# Patient Record
Sex: FEMALE | Race: WHITE | Employment: FULL TIME | URBAN - METROPOLITAN AREA
[De-identification: names, ages, dates, MRNs, and addresses within clinical notes are randomized per-mention and may not be internally consistent; named-entity substitution may affect disease eponyms.]

---

## 2019-02-04 ENCOUNTER — PATIENT OUTREACH (OUTPATIENT)
Dept: CASE MANAGEMENT | Age: 29
End: 2019-02-04

## 2019-02-04 PROBLEM — Z17.0 MALIGNANT NEOPLASM OF BREAST IN FEMALE, ESTROGEN RECEPTOR POSITIVE (HCC): Status: ACTIVE | Noted: 2019-02-04

## 2019-02-04 PROBLEM — C50.919 MALIGNANT NEOPLASM OF BREAST IN FEMALE, ESTROGEN RECEPTOR POSITIVE (HCC): Status: ACTIVE | Noted: 2019-02-04

## 2019-02-04 NOTE — PROGRESS NOTES
Met patient today with Dr Ari Sandoval for consultation at our office. Pt is currently being treated in [place] with Dr Dae Chen for triple positive breast cancer however she will be moving to [facility] in probable April or May. Pt will have radiation oncology appt with Dr Hugh Lugo on 2-6-19 since pt will be relocated by radiation time       Pt will be receiving Herceptin/Perjeta for 1 yr so Dr Ari Sandoval told pt we could arrange for her to have infusion here although she may have to see [facility] first.     Pt was given Olivia Ding contact number, my number as secondary and the office number.  Nurse navigation will be following once pt has established herself in [place]

## 2019-02-05 ENCOUNTER — NURSE NAVIGATOR (OUTPATIENT)
Dept: CASE MANAGEMENT | Age: 29
End: 2019-02-05

## 2019-02-05 NOTE — PROGRESS NOTES
Called pt and left message on Voicemail for appt at 0830 tomorrow on 2-6-19 with Dr Sybil Benavidez.

## 2019-02-06 ENCOUNTER — HOSPITAL ENCOUNTER (OUTPATIENT)
Dept: RADIATION ONCOLOGY | Age: 29
Discharge: HOME | End: 2019-02-06
Payer: COMMERCIAL

## 2019-02-06 VITALS
BODY MASS INDEX: 20.3 KG/M2 | TEMPERATURE: 98.3 F | DIASTOLIC BLOOD PRESSURE: 68 MMHG | RESPIRATION RATE: 16 BRPM | OXYGEN SATURATION: 100 % | SYSTOLIC BLOOD PRESSURE: 132 MMHG | HEART RATE: 76 BPM | WEIGHT: 123.9 LBS

## 2019-02-06 PROCEDURE — 99211 OFF/OP EST MAY X REQ PHY/QHP: CPT

## 2019-02-06 NOTE — CONSULTS
Patient: Jenaro Doshi MRN: [id number]  SSN: [id number]    YOB: 1990  Age: 33 y.o. Sex: female      Other Providers:  Laury RAYMUNDO, Mariam King MD ([address] at [facility] in [place])    [address] [place] [address] Avenue: Breast cancer    DIAGNOSIS: Right breast invasive ductal carcinoma, Stage IIB ([id number]), ER/DC/ HER2 positive    PREVIOUS TREATMENT:  1)  Right partial mastectomy and sentinel lymph node biopsy on 9/24/18  2) C1 adjuvant TCHP 10/22/18, planned to complete 6th cycle 2/11/19  3) Planned completion mastectomy with expander placement. HISTORY OF PRESENT ILLNESS:  Jenaro Doshi is a 33 y.o. female who I am seeing at the request of Dr. Valentin Covarrubias. She initially presented in August 2018 with a palpable mass in her right lateral breast.  Upon further questioning, it had been present for approximately 8 years. There were no associated nipple discharge or skin changes. Right breast US on 8/27/18 identified a 3.5 cm mass with no suspicious axillary lymph nodes. US guided core biopsy 9/11/18 revealed IDC, grade 3, measuring 13 mm in maximal linear dimension. She saw genetics 9/17/18 and underwent egg collection and preservation. She subsequently underwent right partial mastectomy and sentinel lymph node biopsy on 9/24/18 which revealed stage IIb ([id number]), ER/DC/ AR+, HER-2 FISH positive, IDC at 9 o'clock. Surgical margins were uninvolved by invasive carcinoma but DCIS was present at the lateral margin focally, 3 mm. A focus of DCIS was also found 1 mm from deep margin and 2 mm from inferior margin. A13 mm lymph node was also found in 1/9 lymph nodes. Following surgery, she was seen by oncology, Dr. Mariam King, Oncologist at [facility] in [place], who recommended adjuvant chemotherapy. She started C1 adjuvant TCHP 10/22/18. She developed neutropenic fever requiring hospitalization from 12/9/18  12/12/18 after C3.   No source was found, but viral URI was presumed. C4 was delayed one week due to 70K platelet count. C4 was administered on 12/31/18 with carbo dose reduced to 700 mg (AUC 4.85). The plan outlined by Dr. Conrad Severs included 6 cycles of TCHP, with continuation of dual Ab therapy every 3 weeks for a total of one year if tolerated, as well as, starting endocrine therapy (tamoxifen vs ovarian suppression). She was last seen by Dr. Misa Baez on 1/21/19, and started cycle 5 of treatment the same day with plans to complete cycle six on 2/11/19 in [place]. However, she recently relocated to the [facility] and was seeking to establish local oncology care. She therefore met with Dr. Kim Claros 2/4/19 and also wished to have radiation delivered locally. As result, she was referred to our office for discussion and radiation planning. With the findings on her partial mastectomy, she does have a planned mastectomy with expanders to be placed early March. OBSTETRIC HISTORY:    Menarche at the age of 16. G0,P0. Oral Contraceptive Pills:  None  Hormone Replacement Therapy:  None    PAST MEDICAL HISTORY:    Past Medical History:   Diagnosis Date    Anxiety     Asthma     Cancer ([facility] [address] 09/2018    stage 2 triple positive ductal breast cancer       The patient denies history of collagen vascular diseases, pacemaker insertion, prior radiation or prior chemotherapy predating her breast cancer diagnosis. PAST SURGICAL HISTORY:   Past Surgical History:   Procedure Laterality Date    BREAST SURGERY PROCEDURE UNLISTED      HX BREAST LUMPECTOMY  09/2018    HX ORTHOPAEDIC Right 06/2009    knee surgery    HX ORTHOPAEDIC Left 06/2010    knee surgery    [place] BIOPSY OF BREAST, NEEDLE CORE Right 09/2018       MEDICATIONS:     Current Outpatient Medications:     LORazepam (ATIVAN) 1 mg tablet, Take  by mouth every four (4) hours as needed for Anxiety. , Disp: , Rfl:     prochlorperazine (COMPAZINE) 10 mg tablet, Take 5 mg by mouth every six (6) hours as needed. , Disp: , Rfl:     ondansetron hcl (ZOFRAN) 8 mg tablet, Take 8 mg by mouth every eight (8) hours as needed for Nausea., Disp: , Rfl:     dexamethasone (DECADRON) 4 mg tablet, Take  by mouth every twelve (12) hours. , Disp: , Rfl:     albuterol (PROVENTIL HFA, VENTOLIN HFA, PROAIR HFA) 90 mcg/actuation inhaler, Take  by inhalation as needed for Wheezing., Disp: , Rfl:     ALLERGIES:   No Known Allergies    SOCIAL HISTORY:   Social History     Socioeconomic History    Marital status: SINGLE     Spouse name: Not on file    Number of children: Not on file    Years of education: Not on file    Highest education level: Not on file   Social Needs    Financial resource strain: Not on file    Food insecurity - worry: Not on file    Food insecurity - inability: Not on file   [employer] needs - medical: Not on file   [employer] needs - non-medical: Not on file   Occupational History    Not on file   Tobacco Use    Smoking status: Never Smoker    Smokeless tobacco: Never Used   Substance and Sexual Activity    Alcohol use: Yes     Alcohol/week: 2.4 oz     Types: 2 Cans of beer, 2 Glasses of wine per week    Drug use: No    Sexual activity: Yes     Partners: Male     Birth control/protection: Condom   Other Topics Concern    Not on file   Social History Narrative    Not on file       FAMILY HISTORY:   Family History   Problem Relation Age of Onset    Cancer Mother         skin cancer    Cancer Father         skin cancer    Cancer Paternal Aunt         breast cancer       REVIEW OF SYSTEMS: Please see the completed review of systems sheet in the chart that I have reviewed today. PHYSICAL EXAMINATION:   ECOG Performance status 0  VITAL SIGNS:   Visit Vitals  /68   Pulse 76   Temp 98.3 °F (36.8 °C)   Resp 16   Wt 56.2 kg (123 lb 14.4 oz)   SpO2 100%   BMI 20.30 kg/m²        GENERAL: The patient is well-developed, ambulatory, alert and in no acute distress.  HEENT: Head is normocephalic, atraumatic. Pupils are equal, round and reactive to light and accommodation. Extraocular movement intact. Hearing is intact bilaterally to finger rub. Oral cavity reveals no lesions. Mucous membranes are moist. NECK: Neck is supple with no masses. CARDIOVASCULAR: Heart is regular rate and rhythm. There are no murmurs rubs or gallups. Radial pulses are 2+ RESPIRATORY: Lungs are clear to auscultation and percussion. There is normal respiratory effort. GASTROINTESTINAL: The abdomen is soft, non-tender, nondistended with no hepatospelnomagaly. Digital rectal examination: deferred LYMPHATIC: There is no cervical, supraclavicular or axillary lymphadenopathy bilaterally. MUSCULOSKELETAL: Extremities reveal no cyanosis, clubbing or edema.  is 5+/5. BREASTS: Examination of the unaffected breast reveals no dominant nodules or masses. The parital mastectomy site appears well healed. No sign of recurrence. NEURO:  Cranial nerves II-XII grossly intact. Muscular strength and sensation are intact throughout all four extremities. PATHOLOGY:    SURGICAL PATHOOGY 9/11/2018         SURGICAL PATHOLOGY 9/24/2018            LABORATORY: No results found for: NA, K, CL, CO2, AGAP, GLU, BUN, CREA, GFRAA, GFRNA, CA, MG, PHOS, ALB, TBIL, TP, ALB, GLOB, AGRAT, SGOT, ALT, GPT  No results found for: WBC, HGB, HCT, PLT, HGBEXT, HCTEXT, PLTEXT, HGBEXT, HCTEXT, PLTEXT    RADIOLOGY:    RIGHT BREAST/AXILLA ULTRASOUND 8/27/18  FINDINGS:       MRI BREAST B/L W CONTRAST 9/27/2018  FINDINGS:         IMPRESSION:  Quita Weber is a 33 y.o. female with Stage IIB breast cancer. The natural history of breast cancer was reviewed with the patient. Prognostic features including stage, performance status and presence or absence of lymph node involvement were reviewed with the patient.    Various treatment options including lumpectomy alone, breast conservation therapy, and mastectomy were compared and contrasted with regard to outcome and quality of life. We discussed the data in support of Radiation following mastectomy. We discussed the [employer] clinical trials showing a survival benefit from the patient's receiving adjuvant radiation with chemotherapy as opposed to chemotherapy alone. This included patient's with T3, T4, and node positive disease. There are several caveats to the trial including the fact that many patients did not receive adequate lymph node dissections. Despite these limitations, it has become the standard to treat adjuvantly for patients with T3 or T4 tumors as well as four or more lymph nodes. Regarding 1-3 lymph nodes, a recent report from the early breast cancer trialist collaborative group has shown a survival benefit with these adjuvant radiation in a meta-analysis, Lancet Oncology 2013. She did have a positive macrometastases present at time of axillary dissection. She has a planned completion mastectomy on the right along with expander placement. I advised her this was fine to treat through and we will coordinate her follow-up and simulation with time. In the context of this data, I have recommended a course of post postmastectomy radiation. A dose of 60 Gy will be given over 6 weeks with the first 5 weeks delivered to the chest wall and regional lymphatics followed by a local scar boost for the last week of treatment. The practicalities, indications, benefits, side-effects and complications of radiation therapy were discussed. Skin changes fatigue, and potential for long-term complications including radiation pneumonitis, and rib fractures were among the complications highlighted. I have recommended a course of radiation therapy following mastectomy. The patient  asked numerous questions, which were answered to their satisfaction and written informed consent for radiation therapy was obtained.   She will complete her final cycle of chemotherapy next week and will return in 3 weeks for simulation. Plan:  1. Genetic testing-not indicated  2. Smoking cessation-not indicated  3. Patient will be simulated presumably after being fully expanded in [place]. A dose of 50 Gy to the chest wall and regional lymphatics will be followed by a boost to the scar.       Rachel Joy MD  02/06/19

## 2019-02-06 NOTE — PROGRESS NOTES
Pt here today for initial RT consult for triple positive right breast cancer with Dr. Nati Jaimes. Pt is moving here from [place] and needs to establish care. She has been treated with chemotherapy at the [facility], and will be moving to the [address] [place] [address] in May 2019. Pt has spoken with the RT oncologist in [place] [address] and is familiar with the process. A brief overview of RT was given. Pt will need Herceptin/Perjeta according to Dr. Poncho Sparrow note. Records have been requested from [facility] and will be scanned into this chart. Pt stated she will be having a mastectomy in [place], and will have a breast tissue expander in place. RT consents were signed today, they will need to be initialed when she returns for treatment.

## 2019-05-06 ENCOUNTER — HOSPITAL ENCOUNTER (OUTPATIENT)
Dept: LAB | Age: 29
Discharge: HOME | End: 2019-05-06
Payer: COMMERCIAL

## 2019-05-06 ENCOUNTER — HOSPITAL ENCOUNTER (OUTPATIENT)
Dept: INFUSION THERAPY | Age: 29
Discharge: HOME | End: 2019-05-06
Payer: COMMERCIAL

## 2019-05-06 DIAGNOSIS — C50.912 MALIGNANT NEOPLASM OF LEFT BREAST IN FEMALE, ESTROGEN RECEPTOR POSITIVE, UNSPECIFIED SITE OF BREAST (HCC): ICD-10-CM

## 2019-05-06 DIAGNOSIS — C50.911 MALIGNANT NEOPLASM OF RIGHT BREAST IN FEMALE, ESTROGEN RECEPTOR POSITIVE, UNSPECIFIED SITE OF BREAST (HCC): Primary | ICD-10-CM

## 2019-05-06 DIAGNOSIS — Z17.0 MALIGNANT NEOPLASM OF RIGHT BREAST IN FEMALE, ESTROGEN RECEPTOR POSITIVE, UNSPECIFIED SITE OF BREAST (HCC): Primary | ICD-10-CM

## 2019-05-06 DIAGNOSIS — Z17.0 MALIGNANT NEOPLASM OF LEFT BREAST IN FEMALE, ESTROGEN RECEPTOR POSITIVE, UNSPECIFIED SITE OF BREAST (HCC): ICD-10-CM

## 2019-05-06 LAB
ALBUMIN SERPL-MCNC: 3.8 G/DL (ref 3.5–5)
ALBUMIN/GLOB SERPL: 1.2 {RATIO} (ref 1.2–3.5)
ALP SERPL-CCNC: 62 U/L (ref 50–136)
ALT SERPL-CCNC: 20 U/L (ref 12–65)
ANION GAP SERPL CALC-SCNC: 8 MMOL/L (ref 7–16)
AST SERPL-CCNC: 17 U/L (ref 15–37)
BASOPHILS # BLD: 0 K/UL (ref 0–0.2)
BASOPHILS NFR BLD: 0 % (ref 0–2)
BILIRUB SERPL-MCNC: 0.2 MG/DL (ref 0.2–1.1)
BUN SERPL-MCNC: 16 MG/DL (ref 6–23)
CALCIUM SERPL-MCNC: 8.8 MG/DL (ref 8.3–10.4)
CHLORIDE SERPL-SCNC: 105 MMOL/L (ref 98–107)
CO2 SERPL-SCNC: 26 MMOL/L (ref 21–32)
CREAT SERPL-MCNC: 0.58 MG/DL (ref 0.6–1)
DIFFERENTIAL METHOD BLD: ABNORMAL
EOSINOPHIL # BLD: 1.1 K/UL (ref 0–0.8)
EOSINOPHIL NFR BLD: 18 % (ref 0.5–7.8)
ERYTHROCYTE [DISTWIDTH] IN BLOOD BY AUTOMATED COUNT: 11.6 % (ref 11.9–14.6)
GLOBULIN SER CALC-MCNC: 3.1 G/DL (ref 2.3–3.5)
GLUCOSE SERPL-MCNC: 92 MG/DL (ref 65–100)
HCT VFR BLD AUTO: 34.2 % (ref 35.8–46.3)
HGB BLD-MCNC: 11.4 G/DL (ref 11.7–15.4)
IMM GRANULOCYTES # BLD AUTO: 0 K/UL (ref 0–0.5)
IMM GRANULOCYTES NFR BLD AUTO: 0 % (ref 0–5)
LYMPHOCYTES # BLD: 1.5 K/UL (ref 0.5–4.6)
LYMPHOCYTES NFR BLD: 26 % (ref 13–44)
MCH RBC QN AUTO: 32.5 PG (ref 26.1–32.9)
MCHC RBC AUTO-ENTMCNC: 33.3 G/DL (ref 31.4–35)
MCV RBC AUTO: 97.4 FL (ref 79.6–97.8)
MONOCYTES # BLD: 0.4 K/UL (ref 0.1–1.3)
MONOCYTES NFR BLD: 7 % (ref 4–12)
NEUTS SEG # BLD: 2.9 K/UL (ref 1.7–8.2)
NEUTS SEG NFR BLD: 49 % (ref 43–78)
NRBC # BLD: 0 K/UL (ref 0–0.2)
PLATELET # BLD AUTO: 163 K/UL (ref 150–450)
PLATELET COMMENTS,PCOM: ADEQUATE
PMV BLD AUTO: 10.4 FL (ref 9.4–12.3)
POTASSIUM SERPL-SCNC: 4 MMOL/L (ref 3.5–5.1)
PROT SERPL-MCNC: 6.9 G/DL (ref 6.3–8.2)
RBC # BLD AUTO: 3.51 M/UL (ref 4.05–5.25)
RBC MORPH BLD: ABNORMAL
SODIUM SERPL-SCNC: 139 MMOL/L (ref 136–145)
WBC # BLD AUTO: 5.9 K/UL (ref 4.3–11.1)
WBC MORPH BLD: ABNORMAL

## 2019-05-06 PROCEDURE — 85025 COMPLETE CBC W/AUTO DIFF WBC: CPT

## 2019-05-06 PROCEDURE — 74011250636 HC RX REV CODE- 250/636: Performed by: INTERNAL MEDICINE

## 2019-05-06 PROCEDURE — 96417 CHEMO IV INFUS EACH ADDL SEQ: CPT

## 2019-05-06 PROCEDURE — 96413 CHEMO IV INFUSION 1 HR: CPT

## 2019-05-06 PROCEDURE — 80053 COMPREHEN METABOLIC PANEL: CPT

## 2019-05-06 PROCEDURE — 74011250636 HC RX REV CODE- 250/636

## 2019-05-06 RX ORDER — SODIUM CHLORIDE 9 MG/ML
25 INJECTION, SOLUTION INTRAVENOUS CONTINUOUS
Status: ACTIVE | OUTPATIENT
Start: 2019-05-06 | End: 2019-05-06

## 2019-05-06 RX ORDER — SODIUM CHLORIDE 0.9 % (FLUSH) 0.9 %
10 SYRINGE (ML) INJECTION AS NEEDED
Status: ACTIVE | OUTPATIENT
Start: 2019-05-06 | End: 2019-05-06

## 2019-05-06 RX ORDER — HEPARIN 100 UNIT/ML
300-500 SYRINGE INTRAVENOUS AS NEEDED
Status: DISPENSED | OUTPATIENT
Start: 2019-05-06 | End: 2019-05-06

## 2019-05-06 RX ADMIN — PERTUZUMAB 420 MG: 30 INJECTION, SOLUTION, CONCENTRATE INTRAVENOUS at 13:21

## 2019-05-06 RX ADMIN — Medication 10 ML: at 13:58

## 2019-05-06 RX ADMIN — SODIUM CHLORIDE 25 ML/HR: 900 INJECTION, SOLUTION INTRAVENOUS at 11:20

## 2019-05-06 RX ADMIN — TRASTUZUMAB 359 MG: 150 INJECTION, POWDER, LYOPHILIZED, FOR SOLUTION INTRAVENOUS at 12:44

## 2019-05-06 RX ADMIN — Medication 10 ML: at 11:20

## 2019-05-06 RX ADMIN — Medication 500 UNITS: at 13:58

## 2019-05-06 NOTE — PROGRESS NOTES
Arrived to the [facility]. Herceptin/Perjeta completed. Patient tolerated well. Patient was receiving treatment in [place] prior to treatment here. Patient reports that she has been tolerating treatments well. Any issues or concerns during appointment:  Patient declined to wait 30 minute wait period after Perjeta. However patient educated on risk of reactions post infusions. Patient/spouse verbalized understanding. Patient aware of next infusion appointment on 5/28 (date) at 7:00 AM (time). Discharged ambulatory.

## 2019-05-15 ENCOUNTER — DOCUMENTATION ONLY (OUTPATIENT)
Dept: HEMATOLOGY | Age: 29
End: 2019-05-15

## 2019-05-15 ENCOUNTER — HOSPITAL ENCOUNTER (OUTPATIENT)
Dept: RADIATION ONCOLOGY | Age: 29
Discharge: HOME | End: 2019-05-15
Payer: COMMERCIAL

## 2019-05-15 VITALS
HEIGHT: 66 IN | HEART RATE: 52 BPM | OXYGEN SATURATION: 100 % | DIASTOLIC BLOOD PRESSURE: 67 MMHG | TEMPERATURE: 98.5 F | RESPIRATION RATE: 20 BRPM | SYSTOLIC BLOOD PRESSURE: 121 MMHG | BODY MASS INDEX: 21.6 KG/M2 | WEIGHT: 134.4 LBS

## 2019-05-15 PROCEDURE — 77290 THER RAD SIMULAJ FIELD CPLX: CPT

## 2019-05-15 PROCEDURE — 77332 RADIATION TREATMENT AID(S): CPT

## 2019-05-15 RX ORDER — ZOLPIDEM TARTRATE 5 MG/1
5 TABLET ORAL
COMMUNITY
End: 2021-04-13

## 2019-05-15 NOTE — PROGRESS NOTES
Pt initialed previous consents (beynd 30 days)  Pt has previously had RT teaching   09/24/2018: Right Lump  Ready to start RT  10/22/2018: started Chemo in [address]  [place] Chemo in [place] -02/11/2019  -Dr Nithya Esquivel, [address] [place] [address]

## 2019-05-15 NOTE — PROGRESS NOTES
I spoke with Ms. Trevor Wagner regarding her insurance coverage. She has [facility] PPO coverage. As of today she has not only met her deductible of $1500 but her OOP of $3750 as well. From here her insurance will pay at 100%. I also spoke with Ms. Trevor Wagner about potential oral medication authorizations, enrollment in the [address] [place], [place] [address]able e ([facility]) and the [address] [facility] ([address]), and assistance organization resource sheet. Next I spoke with Ms. Trevor Wagner about the Radiation Oncology Scheduling/Nursing line and if she ever had any problems regarding scheduling her rad onc appts to give the number that was highlighted a call. I then spoke with her regarding enrolling with [facility] and [facility]. I went over some of the services that [facility] and [facility] offers and the enrollment process. Next, I gave patient flyers about the free Yoga classes for cancer survivors and the Oncology Massage program.  I let her know that she can get a free 30 minute massage. Lastly, I gave Ms. Trevor Wagner a form with various resource organizations that could assist with specific needs (example:  transportation, lodging, preparing meals, home cleaning) as well as the brochures on [employer] and Called to [facility]. Faxed Patient Referral form to the [address] [place], [place] [address] at [phone]. Phone [phone]. Form scanned into chart. Faxed Physician's Statement to the [address] at [phone]. Phone [phone]. Form scanned into chart. Ms. Trevor Wagner voiced understanding of our conversation and stated that she had no questions. I gave her Carol's contact information because she will be Ms. Naomi Hagen and told her to contact her if she had any further questions. Ms. Trevor Wagner is scheduled to receive Herceptin/Perjeta. I informed the patient of the drug replacement program if needed, and of [employer]. I explained the LPOA and she was agreeable to and signed an LPOA.

## 2019-05-15 NOTE — PROGRESS NOTES
Patient: Trae Lombardi MRN: [id number]  SSN: [id number]    YOB: 1990  Age: 30 y.o. Sex: female      Other Providers:  Sri Mcconnell MD, Wily Dowell MD ([address] at [facility] in [place])      DIAGNOSIS: Right breast invasive ductal carcinoma, Stage IIB ([id number]), ER/AL/ HER2 positive    PREVIOUS TREATMENT:  1)  Right partial mastectomy and sentinel lymph node biopsy on 9/24/18  2) C1 adjuvant TCHP 10/22/18, planned to complete 6th cycle 2/11/19  3) Completion mastectomy with expander placement 3/7/19. Planned left prophylactic mastectomy. HISTORY OF PRESENT ILLNESS:  Trae Lombardi is a 30 y.o. female who I am seeing at the request of Dr. Wilman Jasso back in follow up after our original consult with her 2/2019. She initially presented in August 2018 with a palpable mass in her right lateral breast.  Upon further questioning, it had been present for approximately 8 years. There were no associated nipple discharge or skin changes. Right breast US on 8/27/18 identified a 3.5 cm mass with no suspicious axillary lymph nodes. US guided core biopsy 9/11/18 revealed IDC, grade 3, measuring 13 mm in maximal linear dimension. She saw genetics 9/17/18 and underwent egg collection and preservation. She subsequently underwent right partial mastectomy and sentinel lymph node biopsy on 9/24/18 which revealed stage IIb ([id number]), ER/AL/ AR+, HER-2 FISH positive, IDC at 9 o'clock. Surgical margins were uninvolved by invasive carcinoma but DCIS was present at the lateral margin focally, 3 mm. A focus of DCIS was also found 1 mm from deep margin and 2 mm from inferior margin. A13 mm lymph node was also found in 1/9 lymph nodes. Following surgery, she was seen by oncology, Dr. Wily Dowell, Oncologist at [facility] in [place], who recommended adjuvant chemotherapy. She started C1 adjuvant TCHP 10/22/18.   She developed neutropenic fever requiring hospitalization from 12/9/18 - 12/12/18 after C3. No source was found, but viral URI was presumed. C4 was delayed one week due to 70K platelet count. C4 was administered on 12/31/18 with carbo dose reduced to 700 mg (AUC 4.85). The plan outlined by Dr. Elizabeth Donohue included 6 cycles of TCHP, with continuation of dual Ab therapy every 3 weeks for a total of one year if tolerated, as well as, starting endocrine therapy (tamoxifen vs ovarian suppression). She was last seen by Dr. Kiran Vallejo on 1/21/19, and started cycle 5 of treatment the same day with plans to complete cycle six on 2/11/19 in [place]. However, she relocated to the [facility] and was seeking to establish local oncology care. She therefore met with Dr. Keli Ibrahim 2/4/19 and also wished to have radiation delivered locally. As result, she was referred to our office for discussion and radiation planning. With the findings on her partial mastectomy, she did ultimately complete mastectomy 3/7/19 with negative pathology. She had expanders placed on the right which was fully expanded and has plans for prophylactic left mastectomy and reconstruction after radiation. OBSTETRIC HISTORY:    Menarche at the age of 16. G0,P0. Oral Contraceptive Pills:  None  Hormone Replacement Therapy:  None    PAST MEDICAL HISTORY:    Past Medical History:   Diagnosis Date    Anxiety     Asthma     Cancer ([facility] [address] 09/2018    stage 2 triple positive ductal breast cancer       The patient denies history of collagen vascular diseases, pacemaker insertion, prior radiation or prior chemotherapy predating her breast cancer diagnosis.      PAST SURGICAL HISTORY:   Past Surgical History:   Procedure Laterality Date    BREAST SURGERY PROCEDURE UNLISTED      HX BREAST LUMPECTOMY  09/2018    HX ORTHOPAEDIC Right 06/2009    knee surgery    HX ORTHOPAEDIC Left 06/2010    knee surgery    UT BIOPSY OF BREAST, NEEDLE CORE Right 09/2018       MEDICATIONS:     Current Outpatient Medications:     LORazepam (ATIVAN) 1 mg tablet, Take  by mouth every four (4) hours as needed for Anxiety. , Disp: , Rfl:     prochlorperazine (COMPAZINE) 10 mg tablet, Take 5 mg by mouth every six (6) hours as needed. , Disp: , Rfl:     ondansetron hcl (ZOFRAN) 8 mg tablet, Take 8 mg by mouth every eight (8) hours as needed for Nausea., Disp: , Rfl:     dexamethasone (DECADRON) 4 mg tablet, Take  by mouth every twelve (12) hours. , Disp: , Rfl:     albuterol (PROVENTIL HFA, VENTOLIN HFA, PROAIR HFA) 90 mcg/actuation inhaler, Take  by inhalation as needed for Wheezing., Disp: , Rfl:     ALLERGIES:   No Known Allergies    SOCIAL HISTORY:   Social History     Socioeconomic History    Marital status: SINGLE     Spouse name: Not on file    Number of children: Not on file    Years of education: Not on file    Highest education level: Not on file   Occupational History    Not on file   Social Needs    Financial resource strain: Not on file    Food insecurity:     Worry: Not on file     Inability: Not on file    Transportation needs:     Medical: Not on file     Non-medical: Not on file   Tobacco Use    Smoking status: Never Smoker    Smokeless tobacco: Never Used   Substance and Sexual Activity    Alcohol use:  Yes     Alcohol/week: 2.4 oz     Types: 2 Cans of beer, 2 Glasses of wine per week    Drug use: No    Sexual activity: Yes     Partners: Male     Birth control/protection: Condom   Lifestyle    Physical activity:     Days per week: Not on file     Minutes per session: Not on file    Stress: Not on file   Relationships    Social connections:     Talks on phone: Not on file     Gets together: Not on file     Attends Yazidism service: Not on file     Active member of club or organization: Not on file     Attends meetings of clubs or organizations: Not on file     Relationship status: Not on file    Intimate partner violence:     Fear of current or ex partner: Not on file     Emotionally abused: Not on file     Physically abused: Not on file     Forced sexual activity: Not on file   Other Topics Concern    Not on file   Social History Narrative    Not on file       FAMILY HISTORY:   Family History   Problem Relation Age of Onset    Cancer Mother         skin cancer    Cancer Father         skin cancer    Cancer Paternal Aunt         breast cancer       REVIEW OF SYSTEMS: Please see the completed review of systems sheet in the chart that I have reviewed today. PHYSICAL EXAMINATION:   ECOG Performance status 0  VITAL SIGNS:   There were no vitals taken for this visit. GENERAL: The patient is well-developed, ambulatory, alert and in no acute distress. CARDIOVASCULAR: Heart is regular rate and rhythm. There are no murmurs rubs or gallups. Radial pulses are 2+ RESPIRATORY: Lungs are clear to auscultation and percussion. There is normal respiratory effort. GASTROINTESTINAL: The abdomen is soft, non-tender, nondistended with no hepatospelnomagaly. Digital rectal examination: deferred LYMPHATIC: There is no cervical, supraclavicular or axillary lymphadenopathy bilaterally. MUSCULOSKELETAL: Extremities reveal no cyanosis, clubbing or edema.  is 5+/5. BREASTS: Examination of the unaffected right breast show no mass or abnormality. The reconstructed right chest wall is fully inflated with no sign of recurrence.      PATHOLOGY:    SURGICAL PATHOOGY 9/11/2018         SURGICAL PATHOLOGY 9/24/2018            LABORATORY:   Lab Results   Component Value Date/Time    Sodium 139 05/06/2019 09:20 AM    Potassium 4.0 05/06/2019 09:20 AM    Chloride 105 05/06/2019 09:20 AM    CO2 26 05/06/2019 09:20 AM    Anion gap 8 05/06/2019 09:20 AM    Glucose 92 05/06/2019 09:20 AM    BUN 16 05/06/2019 09:20 AM    Creatinine 0.58 (L) 05/06/2019 09:20 AM    GFR est AA >60 05/06/2019 09:20 AM    GFR est non-AA >60 05/06/2019 09:20 AM    Calcium 8.8 05/06/2019 09:20 AM    Albumin 3.8 05/06/2019 09:20 AM    Protein, total 6.9 05/06/2019 09:20 AM    Globulin 3.1 05/06/2019 09:20 AM    A-G Ratio 1.2 05/06/2019 09:20 AM    AST (SGOT) 17 05/06/2019 09:20 AM    ALT (SGPT) 20 05/06/2019 09:20 AM     Lab Results   Component Value Date/Time    WBC 5.9 05/06/2019 09:20 AM    HGB 11.4 (L) 05/06/2019 09:20 AM    HCT 34.2 (L) 05/06/2019 09:20 AM    PLATELET 696 11/74/9435 09:20 AM       RADIOLOGY:    RIGHT BREAST/AXILLA ULTRASOUND 8/27/18  FINDINGS:       MRI BREAST B/L W CONTRAST 9/27/2018  FINDINGS:         IMPRESSION:  Ramila Munguia is a 30 y.o. female with Stage IIB breast cancer. The natural history of breast cancer was again reviewed with the patient. Prognostic features including stage, performance status and presence or absence of lymph node involvement were reviewed with the patient. Various treatment options including lumpectomy alone, breast conservation therapy, and mastectomy were compared and contrasted with regard to outcome and quality of life. We discussed the data in support of Radiation following mastectomy. We discussed the [employer] [place] and [place] clinical trials showing a survival benefit from the patient's receiving adjuvant radiation with chemotherapy as opposed to chemotherapy alone. This included patient's with T3, T4, and node positive disease. There are several caveats to the trial including the fact that many patients did not receive adequate lymph node dissections. Despite these limitations, it has become the standard to treat adjuvantly for patients with T3 or T4 tumors as well as four or more lymph nodes. Regarding 1-3 lymph nodes, a recent report from the early breast cancer trialist collaborative group has shown a survival benefit with these adjuvant radiation in a meta-analysis, Lancet Oncology 2013. She did have a positive macrometastases present at time of axillary dissection. She ultimately went on to complete mastectomy on the right along with expander placement with full expansion.    I advised her this was fine to treat through. In the context of this data, I have recommended a course of post postmastectomy radiation. A dose of 60 Gy will be given over 6 weeks with the first 5 weeks delivered to the chest wall and regional lymphatics followed by a local scar boost for the last week of treatment. The practicalities, indications, benefits, side-effects and complications of radiation therapy were discussed. Skin changes fatigue, and potential for long-term complications including radiation pneumonitis, and rib fractures were among the complications highlighted. Her treatment will also influence her reconstruction which will be planned at least 6 months out from radiation along with left-sided reconstruction. I have recommended a course of radiation therapy following mastectomy. The patient  asked numerous questions, which were answered to their satisfaction and written informed consent for radiation therapy was obtained. She will proceed with simulation today. Plan:  1. Genetic testing-not indicated  2. Smoking cessation-not indicated  3. Patient will be simulated today. A dose of 50 Gy to the chest wall and regional lymphatics will be followed by a boost to the scar. Portions of this note were copied from prior encounters and reviewed for accuracy, currency, and represent documentation and tasks completed during this encounter. I verify and attest these portions to be unchanged from prior visits.     Leah Ngo MD  05/15/19

## 2019-05-20 ENCOUNTER — HOSPITAL ENCOUNTER (OUTPATIENT)
Dept: RADIATION ONCOLOGY | Age: 29
Discharge: HOME | End: 2019-05-20
Payer: COMMERCIAL

## 2019-05-20 PROCEDURE — 77300 RADIATION THERAPY DOSE PLAN: CPT

## 2019-05-20 PROCEDURE — 77295 3-D RADIOTHERAPY PLAN: CPT

## 2019-05-20 PROCEDURE — 77334 RADIATION TREATMENT AID(S): CPT

## 2019-05-22 ENCOUNTER — HOSPITAL ENCOUNTER (OUTPATIENT)
Dept: RADIATION ONCOLOGY | Age: 29
Discharge: HOME | End: 2019-05-22
Payer: COMMERCIAL

## 2019-05-22 PROCEDURE — 77280 THER RAD SIMULAJ FIELD SMPL: CPT

## 2019-05-22 PROCEDURE — 77412 RADIATION TX DELIVERY LVL 3: CPT

## 2019-05-23 ENCOUNTER — HOSPITAL ENCOUNTER (OUTPATIENT)
Dept: RADIATION ONCOLOGY | Age: 29
Discharge: HOME | End: 2019-05-23
Payer: COMMERCIAL

## 2019-05-23 PROCEDURE — 77331 SPECIAL RADIATION DOSIMETRY: CPT

## 2019-05-23 PROCEDURE — 77412 RADIATION TX DELIVERY LVL 3: CPT

## 2019-05-24 ENCOUNTER — HOSPITAL ENCOUNTER (OUTPATIENT)
Dept: RADIATION ONCOLOGY | Age: 29
Discharge: HOME | End: 2019-05-24
Payer: COMMERCIAL

## 2019-05-24 PROCEDURE — 77412 RADIATION TX DELIVERY LVL 3: CPT

## 2019-05-28 ENCOUNTER — HOSPITAL ENCOUNTER (OUTPATIENT)
Dept: INFUSION THERAPY | Age: 29
Discharge: HOME | End: 2019-05-28
Payer: COMMERCIAL

## 2019-05-28 ENCOUNTER — HOSPITAL ENCOUNTER (OUTPATIENT)
Dept: RADIATION ONCOLOGY | Age: 29
Discharge: HOME | End: 2019-05-28
Payer: COMMERCIAL

## 2019-05-28 VITALS
BODY MASS INDEX: 22.12 KG/M2 | SYSTOLIC BLOOD PRESSURE: 117 MMHG | RESPIRATION RATE: 18 BRPM | WEIGHT: 135 LBS | TEMPERATURE: 98.2 F | HEART RATE: 58 BPM | DIASTOLIC BLOOD PRESSURE: 59 MMHG | OXYGEN SATURATION: 99 %

## 2019-05-28 DIAGNOSIS — C50.911 MALIGNANT NEOPLASM OF RIGHT BREAST IN FEMALE, ESTROGEN RECEPTOR POSITIVE, UNSPECIFIED SITE OF BREAST (HCC): Primary | ICD-10-CM

## 2019-05-28 DIAGNOSIS — Z17.0 MALIGNANT NEOPLASM OF RIGHT BREAST IN FEMALE, ESTROGEN RECEPTOR POSITIVE, UNSPECIFIED SITE OF BREAST (HCC): Primary | ICD-10-CM

## 2019-05-28 PROCEDURE — 77412 RADIATION TX DELIVERY LVL 3: CPT

## 2019-05-28 PROCEDURE — 96413 CHEMO IV INFUSION 1 HR: CPT

## 2019-05-28 PROCEDURE — 74011250636 HC RX REV CODE- 250/636

## 2019-05-28 PROCEDURE — 96417 CHEMO IV INFUS EACH ADDL SEQ: CPT

## 2019-05-28 PROCEDURE — 74011250636 HC RX REV CODE- 250/636: Performed by: NURSE PRACTITIONER

## 2019-05-28 RX ORDER — SODIUM CHLORIDE 9 MG/ML
25 INJECTION, SOLUTION INTRAVENOUS CONTINUOUS
Status: ACTIVE | OUTPATIENT
Start: 2019-05-28 | End: 2019-05-28

## 2019-05-28 RX ORDER — ONDANSETRON 2 MG/ML
8 INJECTION INTRAMUSCULAR; INTRAVENOUS AS NEEDED
Status: CANCELLED | OUTPATIENT
Start: 2019-05-28

## 2019-05-28 RX ORDER — SODIUM CHLORIDE 9 MG/ML
10 INJECTION INTRAMUSCULAR; INTRAVENOUS; SUBCUTANEOUS AS NEEDED
Status: CANCELLED | OUTPATIENT
Start: 2019-05-28

## 2019-05-28 RX ORDER — SODIUM CHLORIDE 0.9 % (FLUSH) 0.9 %
10 SYRINGE (ML) INJECTION AS NEEDED
Status: ACTIVE | OUTPATIENT
Start: 2019-05-28 | End: 2019-05-28

## 2019-05-28 RX ORDER — HYDROCORTISONE SODIUM SUCCINATE 100 MG/2ML
100 INJECTION, POWDER, FOR SOLUTION INTRAMUSCULAR; INTRAVENOUS AS NEEDED
Status: CANCELLED | OUTPATIENT
Start: 2019-05-28

## 2019-05-28 RX ORDER — ACETAMINOPHEN 325 MG/1
650 TABLET ORAL AS NEEDED
Status: CANCELLED
Start: 2019-05-28

## 2019-05-28 RX ORDER — DIPHENHYDRAMINE HYDROCHLORIDE 50 MG/ML
50 INJECTION, SOLUTION INTRAMUSCULAR; INTRAVENOUS
Status: CANCELLED | OUTPATIENT
Start: 2019-05-28

## 2019-05-28 RX ORDER — DIPHENHYDRAMINE HYDROCHLORIDE 50 MG/ML
50 INJECTION, SOLUTION INTRAMUSCULAR; INTRAVENOUS AS NEEDED
Status: CANCELLED
Start: 2019-05-28

## 2019-05-28 RX ORDER — ACETAMINOPHEN 325 MG/1
650 TABLET ORAL
Status: CANCELLED | OUTPATIENT
Start: 2019-05-28

## 2019-05-28 RX ORDER — HEPARIN 100 UNIT/ML
300-500 SYRINGE INTRAVENOUS AS NEEDED
Status: ACTIVE | OUTPATIENT
Start: 2019-05-28 | End: 2019-05-28

## 2019-05-28 RX ORDER — EPINEPHRINE 1 MG/ML
0.3 INJECTION, SOLUTION, CONCENTRATE INTRAVENOUS AS NEEDED
Status: CANCELLED | OUTPATIENT
Start: 2019-05-28

## 2019-05-28 RX ORDER — ALBUTEROL SULFATE 0.83 MG/ML
2.5 SOLUTION RESPIRATORY (INHALATION) AS NEEDED
Status: CANCELLED
Start: 2019-05-28

## 2019-05-28 RX ADMIN — SODIUM CHLORIDE 25 ML/HR: 900 INJECTION, SOLUTION INTRAVENOUS at 07:53

## 2019-05-28 RX ADMIN — Medication 10 ML: at 07:53

## 2019-05-28 RX ADMIN — Medication 10 ML: at 09:18

## 2019-05-28 RX ADMIN — TRASTUZUMAB 367 MG: 150 INJECTION, POWDER, LYOPHILIZED, FOR SOLUTION INTRAVENOUS at 08:10

## 2019-05-28 RX ADMIN — PERTUZUMAB 420 MG: 30 INJECTION, SOLUTION, CONCENTRATE INTRAVENOUS at 08:45

## 2019-05-28 NOTE — PROGRESS NOTES
Arrived to the [facility]. Herceptin/Perjeta completed. Patient tolerated well. Any issues or concerns during appointment:  Patient refused to stay for 30 minute wait period after Perjeta. Patient aware of next infusion appointment on 6/17 (date) at 9:30 AM (time). Discharged ambulatory.

## 2019-05-29 ENCOUNTER — HOSPITAL ENCOUNTER (OUTPATIENT)
Dept: RADIATION ONCOLOGY | Age: 29
Discharge: HOME | End: 2019-05-29
Payer: COMMERCIAL

## 2019-05-29 PROCEDURE — 77336 RADIATION PHYSICS CONSULT: CPT

## 2019-05-29 PROCEDURE — 77412 RADIATION TX DELIVERY LVL 3: CPT

## 2019-05-29 NOTE — PROGRESS NOTES
Patient: Yoav Duncan MRN: [id number]  SSN: [id number]    YOB: 1990  Age: 30 y.o. Sex: female      DIAGNOSIS: Right breast invasive ductal carcinoma, Stage IIB ([id number]), ER/[place]/ HER2 positive     PREVIOUS TREATMENT:  1)  Right partial mastectomy and sentinel lymph node biopsy on 9/24/18  2) C1 adjuvant TCHP 10/22/18, planned to complete 6th cycle 2/11/19  3) Completion mastectomy with expander placement 3/7/19. Planned left prophylactic mastectomy. TREATMENT SITE:  Right breast    DOSE and FRACTIONATION:  5/25 fractions, 1000 cGy of 5000 cGy planned, 0/5 boost, 0 cGy of 1000 cGy planned. INTERVAL HISTORY:  Yoav Duncan is a 30 y.o. female being treated for 5. She was doing well without complaints week 1. OBJECTIVE:  No findings week 1. There were no vitals taken for this visit. Lab Results   Component Value Date/Time    Sodium 139 05/06/2019 09:20 AM    Potassium 4.0 05/06/2019 09:20 AM    Chloride 105 05/06/2019 09:20 AM    CO2 26 05/06/2019 09:20 AM    Anion gap 8 05/06/2019 09:20 AM    Glucose 92 05/06/2019 09:20 AM    BUN 16 05/06/2019 09:20 AM    Creatinine 0.58 (L) 05/06/2019 09:20 AM    GFR est AA >60 05/06/2019 09:20 AM    GFR est non-AA >60 05/06/2019 09:20 AM    Calcium 8.8 05/06/2019 09:20 AM    Albumin 3.8 05/06/2019 09:20 AM    Protein, total 6.9 05/06/2019 09:20 AM    Globulin 3.1 05/06/2019 09:20 AM    A-G Ratio 1.2 05/06/2019 09:20 AM    AST (SGOT) 17 05/06/2019 09:20 AM    ALT (SGPT) 20 05/06/2019 09:20 AM     Lab Results   Component Value Date/Time    WBC 5.9 05/06/2019 09:20 AM    HGB 11.4 (L) 05/06/2019 09:20 AM    HCT 34.2 (L) 05/06/2019 09:20 AM    PLATELET 715 32/87/9076 09:20 AM       ASSESSMENT and PLAN:  Yoav Duncan is tolerating radiation as anticipated for the current dose and fraction. We will continue on as planned with another treatment visit anticipated next week.         Leah Ngo MD   May 29, 2019

## 2019-05-30 ENCOUNTER — HOSPITAL ENCOUNTER (OUTPATIENT)
Dept: RADIATION ONCOLOGY | Age: 29
Discharge: HOME | End: 2019-05-30
Payer: COMMERCIAL

## 2019-05-30 PROCEDURE — 77417 THER RADIOLOGY PORT IMAGE(S): CPT

## 2019-05-30 PROCEDURE — 77412 RADIATION TX DELIVERY LVL 3: CPT

## 2019-05-31 ENCOUNTER — HOSPITAL ENCOUNTER (OUTPATIENT)
Dept: RADIATION ONCOLOGY | Age: 29
Discharge: HOME | End: 2019-05-31
Payer: COMMERCIAL

## 2019-05-31 PROCEDURE — 77412 RADIATION TX DELIVERY LVL 3: CPT

## 2019-06-03 ENCOUNTER — HOSPITAL ENCOUNTER (OUTPATIENT)
Dept: RADIATION ONCOLOGY | Age: 29
Discharge: HOME | End: 2019-06-03
Payer: COMMERCIAL

## 2019-06-03 PROCEDURE — 77412 RADIATION TX DELIVERY LVL 3: CPT

## 2019-06-03 NOTE — PROGRESS NOTES
Patient: Brody Rey MRN: [id number]  SSN: [id number]    YOB: 1990  Age: 30 y.o. Sex: female      DIAGNOSIS: Right breast invasive ductal carcinoma, Stage IIB ([id number]), ER/[place]/ HER2 positive     PREVIOUS TREATMENT:  1)  Right partial mastectomy and sentinel lymph node biopsy on 9/24/18  2) C1 adjuvant TCHP 10/22/18, planned to complete 6th cycle 2/11/19  3) Completion mastectomy with expander placement 3/7/19. Planned left prophylactic mastectomy. TREATMENT SITE:  Right breast    DOSE and FRACTIONATION:  8/25 fractions, 1600 cGy of 5000 cGy planned, 0/5 boost, 0 cGy of 1000 cGy planned. INTERVAL HISTORY:  Brody Rey is a 30 y.o. female being treated for 5. She was doing well without complaints week 1. Week 2 with skin irritation and redness. OBJECTIVE:  No findings week 1. There were no vitals taken for this visit. Lab Results   Component Value Date/Time    Sodium 139 05/06/2019 09:20 AM    Potassium 4.0 05/06/2019 09:20 AM    Chloride 105 05/06/2019 09:20 AM    CO2 26 05/06/2019 09:20 AM    Anion gap 8 05/06/2019 09:20 AM    Glucose 92 05/06/2019 09:20 AM    BUN 16 05/06/2019 09:20 AM    Creatinine 0.58 (L) 05/06/2019 09:20 AM    GFR est AA >60 05/06/2019 09:20 AM    GFR est non-AA >60 05/06/2019 09:20 AM    Calcium 8.8 05/06/2019 09:20 AM    Albumin 3.8 05/06/2019 09:20 AM    Protein, total 6.9 05/06/2019 09:20 AM    Globulin 3.1 05/06/2019 09:20 AM    A-G Ratio 1.2 05/06/2019 09:20 AM    AST (SGOT) 17 05/06/2019 09:20 AM    ALT (SGPT) 20 05/06/2019 09:20 AM     Lab Results   Component Value Date/Time    WBC 5.9 05/06/2019 09:20 AM    HGB 11.4 (L) 05/06/2019 09:20 AM    HCT 34.2 (L) 05/06/2019 09:20 AM    PLATELET 370 76/95/9614 09:20 AM       ASSESSMENT and PLAN:  Brody Rey is tolerating radiation as anticipated for the current dose and fraction. We will continue on as planned with another treatment visit anticipated next week.         Sarah Santana MD Patience 3, 2019

## 2019-06-04 ENCOUNTER — HOSPITAL ENCOUNTER (OUTPATIENT)
Dept: RADIATION ONCOLOGY | Age: 29
Discharge: HOME | End: 2019-06-04
Payer: COMMERCIAL

## 2019-06-04 PROCEDURE — 77412 RADIATION TX DELIVERY LVL 3: CPT

## 2019-06-05 ENCOUNTER — HOSPITAL ENCOUNTER (OUTPATIENT)
Dept: RADIATION ONCOLOGY | Age: 29
Discharge: HOME | End: 2019-06-05
Payer: COMMERCIAL

## 2019-06-05 PROCEDURE — 77336 RADIATION PHYSICS CONSULT: CPT

## 2019-06-05 PROCEDURE — 77412 RADIATION TX DELIVERY LVL 3: CPT

## 2019-06-06 ENCOUNTER — HOSPITAL ENCOUNTER (OUTPATIENT)
Dept: RADIATION ONCOLOGY | Age: 29
Discharge: HOME | End: 2019-06-06
Payer: COMMERCIAL

## 2019-06-06 PROCEDURE — 77412 RADIATION TX DELIVERY LVL 3: CPT

## 2019-06-06 PROCEDURE — 77417 THER RADIOLOGY PORT IMAGE(S): CPT

## 2019-06-07 ENCOUNTER — APPOINTMENT (OUTPATIENT)
Dept: RADIATION ONCOLOGY | Age: 29
End: 2019-06-07
Payer: COMMERCIAL

## 2019-06-10 ENCOUNTER — HOSPITAL ENCOUNTER (OUTPATIENT)
Dept: RADIATION ONCOLOGY | Age: 29
Discharge: HOME | End: 2019-06-10
Payer: COMMERCIAL

## 2019-06-10 PROCEDURE — 77412 RADIATION TX DELIVERY LVL 3: CPT

## 2019-06-10 NOTE — PROGRESS NOTES
Patient: Marisol Thorne MRN: [id number]  SSN: [id number]    YOB: 1990  Age: 30 y.o. Sex: female      DIAGNOSIS: Right breast invasive ductal carcinoma, Stage IIB ([id number]), ER/[place]/ HER2 positive     PREVIOUS TREATMENT:  1)  Right partial mastectomy and sentinel lymph node biopsy on 9/24/18  2) C1 adjuvant TCHP 10/22/18, planned to complete 6th cycle 2/11/19  3) Completion mastectomy with expander placement 3/7/19. Planned left prophylactic mastectomy. TREATMENT SITE:  Right breast    DOSE and FRACTIONATION:  12/25 fractions, 2400 cGy of 5000 cGy planned, 0/5 boost, 0 cGy of 1000 cGy planned. INTERVAL HISTORY:  Marisol Thorne is a 30 y.o. female being treated for 5. She was doing well without complaints week 1. Week 2 with skin irritation and redness. More discomfort in the axilla week 3. OBJECTIVE:  No findings week 1. There were no vitals taken for this visit. Lab Results   Component Value Date/Time    Sodium 139 05/06/2019 09:20 AM    Potassium 4.0 05/06/2019 09:20 AM    Chloride 105 05/06/2019 09:20 AM    CO2 26 05/06/2019 09:20 AM    Anion gap 8 05/06/2019 09:20 AM    Glucose 92 05/06/2019 09:20 AM    BUN 16 05/06/2019 09:20 AM    Creatinine 0.58 (L) 05/06/2019 09:20 AM    GFR est AA >60 05/06/2019 09:20 AM    GFR est non-AA >60 05/06/2019 09:20 AM    Calcium 8.8 05/06/2019 09:20 AM    Albumin 3.8 05/06/2019 09:20 AM    Protein, total 6.9 05/06/2019 09:20 AM    Globulin 3.1 05/06/2019 09:20 AM    A-G Ratio 1.2 05/06/2019 09:20 AM    AST (SGOT) 17 05/06/2019 09:20 AM    ALT (SGPT) 20 05/06/2019 09:20 AM     Lab Results   Component Value Date/Time    WBC 5.9 05/06/2019 09:20 AM    HGB 11.4 (L) 05/06/2019 09:20 AM    HCT 34.2 (L) 05/06/2019 09:20 AM    PLATELET 530 72/15/2586 09:20 AM       ASSESSMENT and PLAN:  Marisol Cast is tolerating radiation as anticipated for the current dose and fraction.   We will continue on as planned with another treatment visit anticipated next week.         Ute Clifford MD   Patience 10, 2019

## 2019-06-11 ENCOUNTER — HOSPITAL ENCOUNTER (OUTPATIENT)
Dept: RADIATION ONCOLOGY | Age: 29
Discharge: HOME | End: 2019-06-11
Payer: COMMERCIAL

## 2019-06-11 PROCEDURE — 77412 RADIATION TX DELIVERY LVL 3: CPT

## 2019-06-12 ENCOUNTER — HOSPITAL ENCOUNTER (OUTPATIENT)
Dept: RADIATION ONCOLOGY | Age: 29
Discharge: HOME | End: 2019-06-12
Payer: COMMERCIAL

## 2019-06-12 PROCEDURE — 77412 RADIATION TX DELIVERY LVL 3: CPT

## 2019-06-13 ENCOUNTER — HOSPITAL ENCOUNTER (OUTPATIENT)
Dept: RADIATION ONCOLOGY | Age: 29
Discharge: HOME | End: 2019-06-13
Payer: COMMERCIAL

## 2019-06-13 PROCEDURE — 77412 RADIATION TX DELIVERY LVL 3: CPT

## 2019-06-13 PROCEDURE — 77336 RADIATION PHYSICS CONSULT: CPT

## 2019-06-14 ENCOUNTER — HOSPITAL ENCOUNTER (OUTPATIENT)
Dept: RADIATION ONCOLOGY | Age: 29
Discharge: HOME | End: 2019-06-14
Payer: COMMERCIAL

## 2019-06-14 PROCEDURE — 77417 THER RADIOLOGY PORT IMAGE(S): CPT

## 2019-06-14 PROCEDURE — 77412 RADIATION TX DELIVERY LVL 3: CPT

## 2019-06-14 NOTE — PROGRESS NOTES
Patient: Kiara Wilcox MRN: [id number]  SSN: [id number]    YOB: 1990  Age: 30 y.o. Sex: female      DIAGNOSIS: Right breast invasive ductal carcinoma, Stage IIB ([id number]), ER/[place]/ HER2 positive     PREVIOUS TREATMENT:  1)  Right partial mastectomy and sentinel lymph node biopsy on 9/24/18  2) C1 adjuvant TCHP 10/22/18, planned to complete 6th cycle 2/11/19  3) Completion mastectomy with expander placement 3/7/19. Planned left prophylactic mastectomy. TREATMENT SITE:  Right breast    DOSE and FRACTIONATION:  16/25 fractions, 3200 cGy of 5000 cGy planned, 0/5 boost, 0 cGy of 1000 cGy planned. INTERVAL HISTORY:  Kiara Wilcox is a 30 y.o. female being treated for 5. She was doing well without complaints week 1. Week 2 with skin irritation and redness. More discomfort in the axilla week 3. Bright erythema in the skin week 4. OBJECTIVE:  No findings week 1. There were no vitals taken for this visit. Lab Results   Component Value Date/Time    Sodium 139 05/06/2019 09:20 AM    Potassium 4.0 05/06/2019 09:20 AM    Chloride 105 05/06/2019 09:20 AM    CO2 26 05/06/2019 09:20 AM    Anion gap 8 05/06/2019 09:20 AM    Glucose 92 05/06/2019 09:20 AM    BUN 16 05/06/2019 09:20 AM    Creatinine 0.58 (L) 05/06/2019 09:20 AM    GFR est AA >60 05/06/2019 09:20 AM    GFR est non-AA >60 05/06/2019 09:20 AM    Calcium 8.8 05/06/2019 09:20 AM    Albumin 3.8 05/06/2019 09:20 AM    Protein, total 6.9 05/06/2019 09:20 AM    Globulin 3.1 05/06/2019 09:20 AM    A-G Ratio 1.2 05/06/2019 09:20 AM    AST (SGOT) 17 05/06/2019 09:20 AM    ALT (SGPT) 20 05/06/2019 09:20 AM     Lab Results   Component Value Date/Time    WBC 5.9 05/06/2019 09:20 AM    HGB 11.4 (L) 05/06/2019 09:20 AM    HCT 34.2 (L) 05/06/2019 09:20 AM    PLATELET 895 10/43/7771 09:20 AM       ASSESSMENT and PLAN:  Kiara Louder is tolerating radiation as anticipated for the current dose and fraction.   Will hold bolus after fraction 25.  Advil for pain and hydrocortisone for itch. We will continue on as planned with another treatment visit anticipated next week.         Kian Pacheco MD   June 14, 2019

## 2019-06-17 ENCOUNTER — PATIENT OUTREACH (OUTPATIENT)
Dept: CASE MANAGEMENT | Age: 29
End: 2019-06-17

## 2019-06-17 ENCOUNTER — HOSPITAL ENCOUNTER (OUTPATIENT)
Dept: LAB | Age: 29
Discharge: HOME | End: 2019-06-17
Payer: COMMERCIAL

## 2019-06-17 ENCOUNTER — HOSPITAL ENCOUNTER (OUTPATIENT)
Dept: RADIATION ONCOLOGY | Age: 29
Discharge: HOME | End: 2019-06-17
Payer: COMMERCIAL

## 2019-06-17 ENCOUNTER — HOSPITAL ENCOUNTER (OUTPATIENT)
Dept: INFUSION THERAPY | Age: 29
Discharge: HOME | End: 2019-06-17
Payer: COMMERCIAL

## 2019-06-17 VITALS — HEIGHT: 66 IN | BODY MASS INDEX: 22.37 KG/M2

## 2019-06-17 DIAGNOSIS — C50.911 MALIGNANT NEOPLASM OF RIGHT BREAST IN FEMALE, ESTROGEN RECEPTOR POSITIVE, UNSPECIFIED SITE OF BREAST (HCC): Primary | ICD-10-CM

## 2019-06-17 DIAGNOSIS — Z17.0 MALIGNANT NEOPLASM OF RIGHT BREAST IN FEMALE, ESTROGEN RECEPTOR POSITIVE, UNSPECIFIED SITE OF BREAST (HCC): ICD-10-CM

## 2019-06-17 DIAGNOSIS — Z17.0 MALIGNANT NEOPLASM OF RIGHT BREAST IN FEMALE, ESTROGEN RECEPTOR POSITIVE, UNSPECIFIED SITE OF BREAST (HCC): Primary | ICD-10-CM

## 2019-06-17 DIAGNOSIS — C50.911 MALIGNANT NEOPLASM OF RIGHT BREAST IN FEMALE, ESTROGEN RECEPTOR POSITIVE, UNSPECIFIED SITE OF BREAST (HCC): ICD-10-CM

## 2019-06-17 LAB
ALBUMIN SERPL-MCNC: 3.8 G/DL (ref 3.5–5)
ALBUMIN/GLOB SERPL: 1.2 {RATIO} (ref 1.2–3.5)
ALP SERPL-CCNC: 66 U/L (ref 50–136)
ALT SERPL-CCNC: 19 U/L (ref 12–65)
ANION GAP SERPL CALC-SCNC: 7 MMOL/L (ref 7–16)
AST SERPL-CCNC: 9 U/L (ref 15–37)
BASOPHILS # BLD: 0 K/UL (ref 0–0.2)
BASOPHILS NFR BLD: 1 % (ref 0–2)
BILIRUB SERPL-MCNC: 0.3 MG/DL (ref 0.2–1.1)
BUN SERPL-MCNC: 10 MG/DL (ref 6–23)
CALCIUM SERPL-MCNC: 8.9 MG/DL (ref 8.3–10.4)
CHLORIDE SERPL-SCNC: 107 MMOL/L (ref 98–107)
CO2 SERPL-SCNC: 24 MMOL/L (ref 21–32)
CREAT SERPL-MCNC: 0.66 MG/DL (ref 0.6–1)
DIFFERENTIAL METHOD BLD: ABNORMAL
EOSINOPHIL # BLD: 0.4 K/UL (ref 0–0.8)
EOSINOPHIL NFR BLD: 11 % (ref 0.5–7.8)
ERYTHROCYTE [DISTWIDTH] IN BLOOD BY AUTOMATED COUNT: 12 % (ref 11.9–14.6)
GLOBULIN SER CALC-MCNC: 3.2 G/DL (ref 2.3–3.5)
GLUCOSE SERPL-MCNC: 92 MG/DL (ref 65–100)
HCT VFR BLD AUTO: 33.5 % (ref 35.8–46.3)
HGB BLD-MCNC: 11.4 G/DL (ref 11.7–15.4)
IMM GRANULOCYTES # BLD AUTO: 0 K/UL (ref 0–0.5)
IMM GRANULOCYTES NFR BLD AUTO: 0 % (ref 0–5)
LYMPHOCYTES # BLD: 0.9 K/UL (ref 0.5–4.6)
LYMPHOCYTES NFR BLD: 24 % (ref 13–44)
MAGNESIUM SERPL-MCNC: 2.2 MG/DL (ref 1.8–2.4)
MCH RBC QN AUTO: 31.7 PG (ref 26.1–32.9)
MCHC RBC AUTO-ENTMCNC: 34 G/DL (ref 31.4–35)
MCV RBC AUTO: 93.1 FL (ref 79.6–97.8)
MONOCYTES # BLD: 0.3 K/UL (ref 0.1–1.3)
MONOCYTES NFR BLD: 9 % (ref 4–12)
NEUTS SEG # BLD: 2.1 K/UL (ref 1.7–8.2)
NEUTS SEG NFR BLD: 57 % (ref 43–78)
NRBC # BLD: 0 K/UL (ref 0–0.2)
PLATELET # BLD AUTO: 152 K/UL (ref 150–450)
PMV BLD AUTO: 10.8 FL (ref 9.4–12.3)
POTASSIUM SERPL-SCNC: 4 MMOL/L (ref 3.5–5.1)
PROT SERPL-MCNC: 7 G/DL (ref 6.3–8.2)
RBC # BLD AUTO: 3.6 M/UL (ref 4.05–5.25)
SODIUM SERPL-SCNC: 138 MMOL/L (ref 136–145)
WBC # BLD AUTO: 3.7 K/UL (ref 4.3–11.1)

## 2019-06-17 PROCEDURE — 80053 COMPREHEN METABOLIC PANEL: CPT

## 2019-06-17 PROCEDURE — 85025 COMPLETE CBC W/AUTO DIFF WBC: CPT

## 2019-06-17 PROCEDURE — 74011250636 HC RX REV CODE- 250/636: Performed by: INTERNAL MEDICINE

## 2019-06-17 PROCEDURE — 96417 CHEMO IV INFUS EACH ADDL SEQ: CPT

## 2019-06-17 PROCEDURE — 74011250636 HC RX REV CODE- 250/636

## 2019-06-17 PROCEDURE — 77412 RADIATION TX DELIVERY LVL 3: CPT

## 2019-06-17 PROCEDURE — 96413 CHEMO IV INFUSION 1 HR: CPT

## 2019-06-17 PROCEDURE — 83735 ASSAY OF MAGNESIUM: CPT

## 2019-06-17 RX ORDER — SODIUM CHLORIDE 0.9 % (FLUSH) 0.9 %
10 SYRINGE (ML) INJECTION AS NEEDED
Status: ACTIVE | OUTPATIENT
Start: 2019-06-17 | End: 2019-06-17

## 2019-06-17 RX ORDER — SODIUM CHLORIDE 9 MG/ML
25 INJECTION, SOLUTION INTRAVENOUS CONTINUOUS
Status: ACTIVE | OUTPATIENT
Start: 2019-06-17 | End: 2019-06-17

## 2019-06-17 RX ADMIN — Medication 10 ML: at 09:25

## 2019-06-17 RX ADMIN — PERTUZUMAB 420 MG: 30 INJECTION, SOLUTION, CONCENTRATE INTRAVENOUS at 10:30

## 2019-06-17 RX ADMIN — SODIUM CHLORIDE 25 ML/HR: 900 INJECTION, SOLUTION INTRAVENOUS at 09:30

## 2019-06-17 RX ADMIN — Medication 10 ML: at 11:07

## 2019-06-17 RX ADMIN — TRASTUZUMAB 371 MG: 150 INJECTION, POWDER, LYOPHILIZED, FOR SOLUTION INTRAVENOUS at 09:55

## 2019-06-17 NOTE — PROGRESS NOTES
6/17/2019 Saw the patient with Dr Hussein Schmidt. She is about half way through radiation. She had an echo 6/4 which shows EF of 55-60%. She did start her period on Friday and is bleeding more heavily than usual but her lab work is within normal limits. Overall she is doing well. Dr Keli Ibrahim discussed with her a plan for birth control. She did tell her that one option would be to place an IUD. Will continue to follow.

## 2019-06-17 NOTE — PROGRESS NOTES
Arrived to the [facility]. Chemo completed. Patient tolerated well. Any issues or concerns during appointment: Pt declined to wait 30 min after Perjeta. Spouse driving her home. Patient aware of next infusion appointment on 7/8/19 at 1100. Discharged amb.

## 2019-06-18 ENCOUNTER — HOSPITAL ENCOUNTER (OUTPATIENT)
Dept: RADIATION ONCOLOGY | Age: 29
Discharge: HOME | End: 2019-06-18
Payer: COMMERCIAL

## 2019-06-18 PROCEDURE — 77412 RADIATION TX DELIVERY LVL 3: CPT

## 2019-06-19 ENCOUNTER — HOSPITAL ENCOUNTER (OUTPATIENT)
Dept: RADIATION ONCOLOGY | Age: 29
Discharge: HOME | End: 2019-06-19
Payer: COMMERCIAL

## 2019-06-19 PROCEDURE — 77334 RADIATION TREATMENT AID(S): CPT

## 2019-06-19 PROCEDURE — 77290 THER RAD SIMULAJ FIELD CPLX: CPT

## 2019-06-19 PROCEDURE — 77412 RADIATION TX DELIVERY LVL 3: CPT

## 2019-06-20 ENCOUNTER — HOSPITAL ENCOUNTER (OUTPATIENT)
Dept: RADIATION ONCOLOGY | Age: 29
Discharge: HOME | End: 2019-06-20
Payer: COMMERCIAL

## 2019-06-20 PROCEDURE — 77336 RADIATION PHYSICS CONSULT: CPT

## 2019-06-20 PROCEDURE — 77412 RADIATION TX DELIVERY LVL 3: CPT

## 2019-06-21 ENCOUNTER — HOSPITAL ENCOUNTER (OUTPATIENT)
Dept: RADIATION ONCOLOGY | Age: 29
Discharge: HOME | End: 2019-06-21
Payer: COMMERCIAL

## 2019-06-21 PROCEDURE — 77417 THER RADIOLOGY PORT IMAGE(S): CPT

## 2019-06-21 PROCEDURE — 77412 RADIATION TX DELIVERY LVL 3: CPT

## 2019-06-21 PROCEDURE — 77300 RADIATION THERAPY DOSE PLAN: CPT

## 2019-06-24 ENCOUNTER — HOSPITAL ENCOUNTER (OUTPATIENT)
Dept: RADIATION ONCOLOGY | Age: 29
Discharge: HOME | End: 2019-06-24
Payer: COMMERCIAL

## 2019-06-24 PROCEDURE — 77412 RADIATION TX DELIVERY LVL 3: CPT

## 2019-06-24 NOTE — PROGRESS NOTES
Patient: July Patel MRN: [id number]  SSN: [id number]    YOB: 1990  Age: 30 y.o. Sex: female      DIAGNOSIS: Right breast invasive ductal carcinoma, Stage IIB ([id number]), ER/[place]/ HER2 positive     PREVIOUS TREATMENT:  1)  Right partial mastectomy and sentinel lymph node biopsy on 9/24/18  2) C1 adjuvant TCHP 10/22/18, planned to complete 6th cycle 2/11/19  3) Completion mastectomy with expander placement 3/7/19. Planned left prophylactic mastectomy. TREATMENT SITE:  Right chest wall (with expander)    DOSE and FRACTIONATION:  22/25 fractions, 4400 cGy of 5000 cGy planned, 0/5 boost, 0 cGy of 1000 cGy planned. INTERVAL HISTORY:  July Patel is a 30 y.o. female being treated for the above diagnosis. She was doing well without complaints week 1. Week 2 with skin irritation and redness. More discomfort in the axilla week 3. Bright erythema in the skin week 4 and 5. Bolus discontinued at fraction 20. OBJECTIVE:  Brisk erythema over expander , hint of dry desquamation in the axilla. Mild erythema of the s/clav field, none on back. Overall appears well.      Lab Results   Component Value Date/Time    Sodium 138 06/17/2019 08:00 AM    Potassium 4.0 06/17/2019 08:00 AM    Chloride 107 06/17/2019 08:00 AM    CO2 24 06/17/2019 08:00 AM    Anion gap 7 06/17/2019 08:00 AM    Glucose 92 06/17/2019 08:00 AM    BUN 10 06/17/2019 08:00 AM    Creatinine 0.66 06/17/2019 08:00 AM    GFR est AA >60 06/17/2019 08:00 AM    GFR est non-AA >60 06/17/2019 08:00 AM    Calcium 8.9 06/17/2019 08:00 AM    Magnesium 2.2 06/17/2019 08:00 AM    Albumin 3.8 06/17/2019 08:00 AM    Protein, total 7.0 06/17/2019 08:00 AM    Globulin 3.2 06/17/2019 08:00 AM    A-G Ratio 1.2 06/17/2019 08:00 AM    AST (SGOT) 9 (L) 06/17/2019 08:00 AM    ALT (SGPT) 19 06/17/2019 08:00 AM     Lab Results   Component Value Date/Time    WBC 3.7 (L) 06/17/2019 08:00 AM    HGB 11.4 (L) 06/17/2019 08:00 AM    HCT 33.5 (L) 06/17/2019 08:00 AM    PLATELET 765 77/33/7239 08:00 AM       ASSESSMENT and PLAN:  Eugene Da Silva is tolerating radiation as anticipated for the current dose and fraction. Ibuprofen for pain/inflamation and hydrocortisone for itch. We will continue on as planned with another treatment visit anticipated next week.         Keaton Wilkerson MD   June 24, 2019

## 2019-06-25 ENCOUNTER — HOSPITAL ENCOUNTER (OUTPATIENT)
Dept: RADIATION ONCOLOGY | Age: 29
Discharge: HOME | End: 2019-06-25
Payer: COMMERCIAL

## 2019-06-25 PROCEDURE — 77412 RADIATION TX DELIVERY LVL 3: CPT

## 2019-06-26 ENCOUNTER — HOSPITAL ENCOUNTER (OUTPATIENT)
Dept: RADIATION ONCOLOGY | Age: 29
Discharge: HOME | End: 2019-06-26
Payer: COMMERCIAL

## 2019-06-26 PROCEDURE — 77412 RADIATION TX DELIVERY LVL 3: CPT

## 2019-06-27 ENCOUNTER — HOSPITAL ENCOUNTER (OUTPATIENT)
Dept: RADIATION ONCOLOGY | Age: 29
Discharge: HOME | End: 2019-06-27
Payer: COMMERCIAL

## 2019-06-27 PROCEDURE — 77336 RADIATION PHYSICS CONSULT: CPT

## 2019-06-27 PROCEDURE — 77412 RADIATION TX DELIVERY LVL 3: CPT

## 2019-06-28 ENCOUNTER — HOSPITAL ENCOUNTER (OUTPATIENT)
Dept: RADIATION ONCOLOGY | Age: 29
Discharge: HOME | End: 2019-06-28
Payer: COMMERCIAL

## 2019-06-28 PROCEDURE — 77412 RADIATION TX DELIVERY LVL 3: CPT

## 2019-07-01 ENCOUNTER — HOSPITAL ENCOUNTER (OUTPATIENT)
Dept: RADIATION ONCOLOGY | Age: 29
Discharge: HOME | End: 2019-07-01
Payer: COMMERCIAL

## 2019-07-01 PROCEDURE — 77412 RADIATION TX DELIVERY LVL 3: CPT

## 2019-07-01 NOTE — PROGRESS NOTES
Patient: Meliza Medina MRN: [id number]  SSN: [id number]    YOB: 1990  Age: 30 y.o. Sex: female      DIAGNOSIS: Right breast invasive ductal carcinoma, Stage IIB ([id number]), ER/TN/ HER2 positive     PREVIOUS TREATMENT:  1)  Right partial mastectomy and sentinel lymph node biopsy on 9/24/18  2) C1 adjuvant TCHP 10/22/18, planned to complete 6th cycle 2/11/19  3) Completion mastectomy with expander placement 3/7/19. Planned left prophylactic mastectomy. TREATMENT SITE:  Right breast    DOSE and FRACTIONATION:  25/25 fractions, 5000 cGy of 5000 cGy planned, 2/5 boost, 400 cGy of 1000 cGy planned. INTERVAL HISTORY:  Meliza Medina is a 30 y.o. female being treated for 5. She was doing well without complaints week 1. Week 2 with skin irritation and redness. More discomfort in the axilla week 3. Bright erythema in the skin week 4. Developed desquamation in the axilla although not moist along with bright erythema in the inframmary fold. OBJECTIVE:  No findings week 1. There were no vitals taken for this visit.     Lab Results   Component Value Date/Time    Sodium 138 06/17/2019 08:00 AM    Potassium 4.0 06/17/2019 08:00 AM    Chloride 107 06/17/2019 08:00 AM    CO2 24 06/17/2019 08:00 AM    Anion gap 7 06/17/2019 08:00 AM    Glucose 92 06/17/2019 08:00 AM    BUN 10 06/17/2019 08:00 AM    Creatinine 0.66 06/17/2019 08:00 AM    GFR est AA >60 06/17/2019 08:00 AM    GFR est non-AA >60 06/17/2019 08:00 AM    Calcium 8.9 06/17/2019 08:00 AM    Magnesium 2.2 06/17/2019 08:00 AM    Albumin 3.8 06/17/2019 08:00 AM    Protein, total 7.0 06/17/2019 08:00 AM    Globulin 3.2 06/17/2019 08:00 AM    A-G Ratio 1.2 06/17/2019 08:00 AM    AST (SGOT) 9 (L) 06/17/2019 08:00 AM    ALT (SGPT) 19 06/17/2019 08:00 AM     Lab Results   Component Value Date/Time    WBC 3.7 (L) 06/17/2019 08:00 AM    HGB 11.4 (L) 06/17/2019 08:00 AM    HCT 33.5 (L) 06/17/2019 08:00 AM    PLATELET 932 09/43/3755 08:00 AM ASSESSMENT and PLAN:  Jeannine Goodwin is tolerating radiation as anticipated for the current dose and fraction. Will hold bolus after fraction 25. Advil for pain and hydrocortisone for itch. Completes Thursday and will plan for 1 month follow up.        Candace Smith MD   July 1, 2019

## 2019-07-02 ENCOUNTER — HOSPITAL ENCOUNTER (OUTPATIENT)
Dept: RADIATION ONCOLOGY | Age: 29
Discharge: HOME | End: 2019-07-02
Payer: COMMERCIAL

## 2019-07-02 PROCEDURE — 77412 RADIATION TX DELIVERY LVL 3: CPT

## 2019-07-02 RX ORDER — SILVER SULFADIAZINE 10 G/1000G
CREAM TOPICAL 2 TIMES DAILY
Qty: 50 G | Refills: 0 | Status: SHIPPED | OUTPATIENT
Start: 2019-07-02 | End: 2019-07-16

## 2019-07-03 ENCOUNTER — HOSPITAL ENCOUNTER (OUTPATIENT)
Dept: RADIATION ONCOLOGY | Age: 29
Discharge: HOME | End: 2019-07-03
Payer: COMMERCIAL

## 2019-07-03 PROCEDURE — 77412 RADIATION TX DELIVERY LVL 3: CPT

## 2019-07-05 ENCOUNTER — HOSPITAL ENCOUNTER (OUTPATIENT)
Dept: RADIATION ONCOLOGY | Age: 29
Discharge: HOME | End: 2019-07-05
Payer: COMMERCIAL

## 2019-07-05 PROCEDURE — 77336 RADIATION PHYSICS CONSULT: CPT

## 2019-07-05 PROCEDURE — 77412 RADIATION TX DELIVERY LVL 3: CPT

## 2019-07-08 ENCOUNTER — HOSPITAL ENCOUNTER (OUTPATIENT)
Dept: INFUSION THERAPY | Age: 29
Discharge: HOME | End: 2019-07-08
Payer: COMMERCIAL

## 2019-07-08 VITALS
RESPIRATION RATE: 18 BRPM | SYSTOLIC BLOOD PRESSURE: 123 MMHG | TEMPERATURE: 97.8 F | DIASTOLIC BLOOD PRESSURE: 68 MMHG | OXYGEN SATURATION: 100 % | BODY MASS INDEX: 22.16 KG/M2 | HEART RATE: 56 BPM | WEIGHT: 135.2 LBS

## 2019-07-08 DIAGNOSIS — C50.911 MALIGNANT NEOPLASM OF RIGHT BREAST IN FEMALE, ESTROGEN RECEPTOR POSITIVE, UNSPECIFIED SITE OF BREAST (HCC): Primary | ICD-10-CM

## 2019-07-08 DIAGNOSIS — Z17.0 MALIGNANT NEOPLASM OF RIGHT BREAST IN FEMALE, ESTROGEN RECEPTOR POSITIVE, UNSPECIFIED SITE OF BREAST (HCC): Primary | ICD-10-CM

## 2019-07-08 LAB — HCG UR QL: NEGATIVE

## 2019-07-08 PROCEDURE — 96413 CHEMO IV INFUSION 1 HR: CPT

## 2019-07-08 PROCEDURE — 96417 CHEMO IV INFUS EACH ADDL SEQ: CPT

## 2019-07-08 PROCEDURE — 74011250636 HC RX REV CODE- 250/636

## 2019-07-08 PROCEDURE — 81025 URINE PREGNANCY TEST: CPT

## 2019-07-08 PROCEDURE — 74011250636 HC RX REV CODE- 250/636: Performed by: NURSE PRACTITIONER

## 2019-07-08 RX ORDER — EPINEPHRINE 1 MG/ML
0.3 INJECTION, SOLUTION, CONCENTRATE INTRAVENOUS AS NEEDED
Status: CANCELLED | OUTPATIENT
Start: 2019-07-08

## 2019-07-08 RX ORDER — DIPHENHYDRAMINE HYDROCHLORIDE 50 MG/ML
50 INJECTION, SOLUTION INTRAMUSCULAR; INTRAVENOUS AS NEEDED
Status: CANCELLED
Start: 2019-07-08

## 2019-07-08 RX ORDER — SODIUM CHLORIDE 9 MG/ML
10 INJECTION INTRAMUSCULAR; INTRAVENOUS; SUBCUTANEOUS AS NEEDED
Status: CANCELLED | OUTPATIENT
Start: 2019-07-08

## 2019-07-08 RX ORDER — ONDANSETRON 2 MG/ML
8 INJECTION INTRAMUSCULAR; INTRAVENOUS AS NEEDED
Status: DISCONTINUED | OUTPATIENT
Start: 2019-07-08 | End: 2019-07-09 | Stop reason: HOSPADM

## 2019-07-08 RX ORDER — DIPHENHYDRAMINE HYDROCHLORIDE 50 MG/ML
50 INJECTION, SOLUTION INTRAMUSCULAR; INTRAVENOUS
Status: CANCELLED | OUTPATIENT
Start: 2019-07-08

## 2019-07-08 RX ORDER — HEPARIN 100 UNIT/ML
300-500 SYRINGE INTRAVENOUS AS NEEDED
Status: CANCELLED
Start: 2019-07-08

## 2019-07-08 RX ORDER — HYDROCORTISONE SODIUM SUCCINATE 100 MG/2ML
100 INJECTION, POWDER, FOR SOLUTION INTRAMUSCULAR; INTRAVENOUS AS NEEDED
Status: CANCELLED | OUTPATIENT
Start: 2019-07-08

## 2019-07-08 RX ORDER — ALBUTEROL SULFATE 0.83 MG/ML
2.5 SOLUTION RESPIRATORY (INHALATION) AS NEEDED
Status: CANCELLED
Start: 2019-07-08

## 2019-07-08 RX ORDER — ACETAMINOPHEN 325 MG/1
650 TABLET ORAL
Status: CANCELLED | OUTPATIENT
Start: 2019-07-08

## 2019-07-08 RX ORDER — ACETAMINOPHEN 325 MG/1
650 TABLET ORAL AS NEEDED
Status: CANCELLED
Start: 2019-07-08

## 2019-07-08 RX ORDER — SODIUM CHLORIDE 0.9 % (FLUSH) 0.9 %
10 SYRINGE (ML) INJECTION AS NEEDED
Status: DISCONTINUED | OUTPATIENT
Start: 2019-07-08 | End: 2019-07-09 | Stop reason: HOSPADM

## 2019-07-08 RX ORDER — SODIUM CHLORIDE 9 MG/ML
25 INJECTION, SOLUTION INTRAVENOUS CONTINUOUS
Status: ACTIVE | OUTPATIENT
Start: 2019-07-08 | End: 2019-07-08

## 2019-07-08 RX ORDER — DIPHENHYDRAMINE HYDROCHLORIDE 50 MG/ML
50 INJECTION, SOLUTION INTRAMUSCULAR; INTRAVENOUS AS NEEDED
Status: DISCONTINUED | OUTPATIENT
Start: 2019-07-08 | End: 2019-07-09 | Stop reason: HOSPADM

## 2019-07-08 RX ADMIN — Medication 10 ML: at 14:05

## 2019-07-08 RX ADMIN — PERTUZUMAB 420 MG: 30 INJECTION, SOLUTION, CONCENTRATE INTRAVENOUS at 13:30

## 2019-07-08 RX ADMIN — SODIUM CHLORIDE 25 ML/HR: 900 INJECTION, SOLUTION INTRAVENOUS at 11:55

## 2019-07-08 RX ADMIN — TRASTUZUMAB 368 MG: 150 INJECTION, POWDER, LYOPHILIZED, FOR SOLUTION INTRAVENOUS at 12:45

## 2019-07-08 NOTE — PROGRESS NOTES
Tolerated chemotherapy without difficulty. Patient discharged via ambulation accompanied by friend. Instructed to notify physician of any problems, questions or concerns after discharge. Next appointment is 07/29/19 at 0930 with David.

## 2019-07-08 NOTE — DISCHARGE SUMMARY
Patient: Jose Emanuel MRN: [id number]  SSN: [id number]    YOB: 1990  Age: 30 y.o. Sex: female      Jose Emanuel is a 30 y.o. female who was seen by radiation oncology at the request of Dr. Tio Garcia. She initially presented in August 2018 with a palpable mass in her right lateral breast.  Upon further questioning, it had been present for approximately 8 years. There were no associated nipple discharge or skin changes.  Right breast US on 8/27/18 identified a 3.5 cm mass with no suspicious axillary lymph nodes.  US guided core biopsy 9/11/18 revealed IDC, grade 3, measuring 13 mm in maximal linear dimension.  She saw genetics 9/17/18 and underwent egg collection and preservation.  She subsequently underwent right partial mastectomy and sentinel lymph node biopsy on 9/24/18 which revealed stage IIb ([id number]), ER/ID/ AR+, HER-2 FISH positive, IDC at 9 o'clock.  Surgical margins were uninvolved by invasive carcinoma but DCIS was present at the lateral margin focally, 3 mm. A focus of DCIS was also found 1 mm from deep margin and 2 mm from inferior margin.  A13 mm lymph node was also found in 1/9 lymph nodes.       Following surgery, she was seen by oncology, Dr. Jesusita Wahl, Oncologist at [facility] in [place], who recommended adjuvant chemotherapy. She started C1 adjuvant TCHP 10/22/18. She developed neutropenic fever requiring hospitalization from 12/9/18 - 12/12/18 after C3.  No source was found, but viral URI was presumed.  C4 was delayed one week due to 70K platelet count.  C4 was administered on 12/31/18 with carbo dose reduced to 700 mg (AUC 4.85).  The plan outlined by Dr. Phuong Finch included 6 cycles of TCHP, with continuation of dual Ab therapy every 3 weeks for a total of one year if tolerated, as well as, starting endocrine therapy (tamoxifen vs ovarian suppression).  She was last seen by Dr. Jesusita Wahl on 1/21/19, and started cycle 5 of treatment the same day with plans to complete cycle six on 2/11/19 in [place]. [place] Velasquez, she recently relocated to the [address] [place] [address] and was seeking to establish local oncology care. She therefore met with Dr. Arcelia James 2/4/19 and also wished to have radiation delivered locally. As result, she was referred to our office for discussion and radiation planning. With the findings on her partial mastectomy, she underwent completion of mastectomy with expanders.       Dr. Teresa Eugene recommended a course of post postmastectomy radiation. A dose of 60 Gy will be given over 6 weeks with the first 5 weeks delivered to the chest wall and regional lymphatics followed by a local scar boost for the last week of treatment.       Please see the details of her treatment below as well as my plans for future care and surveillance. Please do not hesitate to call with questions or concerns at any time. DIAGNOSIS:  Right breast invasive ductal carcinoma, Stage IIB ([id number]), ER/UT/ HER2 positive    PREVIOUS TREATMENT:    1)  Right partial mastectomy and sentinel lymph node biopsy on 9/24/18  2) C1 adjuvant TCHP 10/22/18, planned to complete 6th cycle 2/11/19  3) Planned completion mastectomy with expander placement. TREATMENT DATES:    1) Right Sclav: 5/22/2019 - 6/27/3029  2) Right chest wall: 5/22/2019 - 6/27/2019  3) Right chest wall boost: 6/28/2019  - 7/5/2019     ANATOMIC SITE:  Right chest wall    DOSE:    1) 5000 cGy in 25 fractions right Sclav  2) 5000 cGy in 25 fractions right chest wall  3) 1000 cGy in 5 fractions right chest wall boost    BEAM ARRANGEMENT:   1) 3D Conformal - 6MV right Sclav   2) 3D Conformal - 10MV right chest wall   3) 3D Conformal - 9E right chest wall boost    CHEMOTHERAPY: None    TREATMENT COURSE:  Mohsen Weber did well without complaints week 1. Week 2 with skin irritation and redness. More discomfort in the axilla week 3. Bright erythema in the skin week 4.   Developed desquamation in the axilla although not moist along with bright erythema in the inframmary fold. Instructed to take advil for pain and hydrocortisone for itch.        PLAN:  The patient will be seen in follow up in 4 weeks to assess acute and sub acute side effects.       SONG Turner MD

## 2019-07-29 ENCOUNTER — HOSPITAL ENCOUNTER (OUTPATIENT)
Dept: LAB | Age: 29
Discharge: HOME | End: 2019-07-29
Payer: COMMERCIAL

## 2019-07-29 ENCOUNTER — HOSPITAL ENCOUNTER (OUTPATIENT)
Dept: INFUSION THERAPY | Age: 29
Discharge: HOME | End: 2019-07-29
Payer: COMMERCIAL

## 2019-07-29 ENCOUNTER — PATIENT OUTREACH (OUTPATIENT)
Dept: CASE MANAGEMENT | Age: 29
End: 2019-07-29

## 2019-07-29 DIAGNOSIS — C50.911 MALIGNANT NEOPLASM OF RIGHT BREAST IN FEMALE, ESTROGEN RECEPTOR POSITIVE, UNSPECIFIED SITE OF BREAST (HCC): Primary | ICD-10-CM

## 2019-07-29 DIAGNOSIS — Z17.0 MALIGNANT NEOPLASM OF RIGHT BREAST IN FEMALE, ESTROGEN RECEPTOR POSITIVE, UNSPECIFIED SITE OF BREAST (HCC): Primary | ICD-10-CM

## 2019-07-29 DIAGNOSIS — C50.911 MALIGNANT NEOPLASM OF RIGHT BREAST IN FEMALE, ESTROGEN RECEPTOR POSITIVE, UNSPECIFIED SITE OF BREAST (HCC): ICD-10-CM

## 2019-07-29 DIAGNOSIS — Z17.0 MALIGNANT NEOPLASM OF RIGHT BREAST IN FEMALE, ESTROGEN RECEPTOR POSITIVE, UNSPECIFIED SITE OF BREAST (HCC): ICD-10-CM

## 2019-07-29 LAB
ALBUMIN SERPL-MCNC: 3.9 G/DL (ref 3.5–5)
ALBUMIN/GLOB SERPL: 1.1 {RATIO} (ref 1.2–3.5)
ALP SERPL-CCNC: 76 U/L (ref 50–136)
ALT SERPL-CCNC: 15 U/L (ref 12–65)
ANION GAP SERPL CALC-SCNC: 7 MMOL/L (ref 7–16)
AST SERPL-CCNC: 6 U/L (ref 15–37)
BASOPHILS # BLD: 0 K/UL (ref 0–0.2)
BASOPHILS NFR BLD: 1 % (ref 0–2)
BILIRUB SERPL-MCNC: 0.4 MG/DL (ref 0.2–1.1)
BUN SERPL-MCNC: 18 MG/DL (ref 6–23)
CALCIUM SERPL-MCNC: 8.7 MG/DL (ref 8.3–10.4)
CHLORIDE SERPL-SCNC: 107 MMOL/L (ref 98–107)
CO2 SERPL-SCNC: 24 MMOL/L (ref 21–32)
CREAT SERPL-MCNC: 0.74 MG/DL (ref 0.6–1)
DIFFERENTIAL METHOD BLD: ABNORMAL
EOSINOPHIL # BLD: 0.3 K/UL (ref 0–0.8)
EOSINOPHIL NFR BLD: 8 % (ref 0.5–7.8)
ERYTHROCYTE [DISTWIDTH] IN BLOOD BY AUTOMATED COUNT: 12 % (ref 11.9–14.6)
FERRITIN SERPL-MCNC: 9 NG/ML (ref 8–388)
FOLATE SERPL-MCNC: 50.1 NG/ML (ref 3.1–17.5)
GLOBULIN SER CALC-MCNC: 3.4 G/DL (ref 2.3–3.5)
GLUCOSE SERPL-MCNC: 115 MG/DL (ref 65–100)
HCT VFR BLD AUTO: 34.8 % (ref 35.8–46.3)
HGB BLD-MCNC: 11.9 G/DL (ref 11.7–15.4)
IMM GRANULOCYTES # BLD AUTO: 0 K/UL (ref 0–0.5)
IMM GRANULOCYTES NFR BLD AUTO: 0 % (ref 0–5)
IRON SATN MFR SERPL: 26 %
IRON SERPL-MCNC: 85 UG/DL (ref 35–150)
LYMPHOCYTES # BLD: 0.7 K/UL (ref 0.5–4.6)
LYMPHOCYTES NFR BLD: 17 % (ref 13–44)
MCH RBC QN AUTO: 31.2 PG (ref 26.1–32.9)
MCHC RBC AUTO-ENTMCNC: 34.2 G/DL (ref 31.4–35)
MCV RBC AUTO: 91.3 FL (ref 79.6–97.8)
MONOCYTES # BLD: 0.3 K/UL (ref 0.1–1.3)
MONOCYTES NFR BLD: 8 % (ref 4–12)
NEUTS SEG # BLD: 2.6 K/UL (ref 1.7–8.2)
NEUTS SEG NFR BLD: 66 % (ref 43–78)
NRBC # BLD: 0 K/UL (ref 0–0.2)
PLATELET # BLD AUTO: 162 K/UL (ref 150–450)
PMV BLD AUTO: 10.1 FL (ref 9.4–12.3)
POTASSIUM SERPL-SCNC: 3.7 MMOL/L (ref 3.5–5.1)
PROT SERPL-MCNC: 7.3 G/DL (ref 6.3–8.2)
RBC # BLD AUTO: 3.81 M/UL (ref 4.05–5.25)
SODIUM SERPL-SCNC: 138 MMOL/L (ref 136–145)
TIBC SERPL-MCNC: 322 UG/DL (ref 250–450)
WBC # BLD AUTO: 3.9 K/UL (ref 4.3–11.1)

## 2019-07-29 PROCEDURE — 82728 ASSAY OF FERRITIN: CPT

## 2019-07-29 PROCEDURE — 96417 CHEMO IV INFUS EACH ADDL SEQ: CPT

## 2019-07-29 PROCEDURE — 74011250636 HC RX REV CODE- 250/636

## 2019-07-29 PROCEDURE — 80053 COMPREHEN METABOLIC PANEL: CPT

## 2019-07-29 PROCEDURE — 82746 ASSAY OF FOLIC ACID SERUM: CPT

## 2019-07-29 PROCEDURE — 85025 COMPLETE CBC W/AUTO DIFF WBC: CPT

## 2019-07-29 PROCEDURE — 74011250636 HC RX REV CODE- 250/636: Performed by: INTERNAL MEDICINE

## 2019-07-29 PROCEDURE — 96413 CHEMO IV INFUSION 1 HR: CPT

## 2019-07-29 PROCEDURE — 83540 ASSAY OF IRON: CPT

## 2019-07-29 RX ORDER — ACETAMINOPHEN 325 MG/1
650 TABLET ORAL
Status: DISCONTINUED | OUTPATIENT
Start: 2019-07-29 | End: 2019-07-30 | Stop reason: HOSPADM

## 2019-07-29 RX ORDER — HYDROCORTISONE SODIUM SUCCINATE 100 MG/2ML
100 INJECTION, POWDER, FOR SOLUTION INTRAMUSCULAR; INTRAVENOUS AS NEEDED
Status: ACTIVE | OUTPATIENT
Start: 2019-07-29 | End: 2019-07-29

## 2019-07-29 RX ORDER — DIPHENHYDRAMINE HYDROCHLORIDE 50 MG/ML
50 INJECTION, SOLUTION INTRAMUSCULAR; INTRAVENOUS
Status: DISCONTINUED | OUTPATIENT
Start: 2019-07-29 | End: 2019-07-30 | Stop reason: HOSPADM

## 2019-07-29 RX ORDER — SODIUM CHLORIDE 9 MG/ML
25 INJECTION, SOLUTION INTRAVENOUS CONTINUOUS
Status: ACTIVE | OUTPATIENT
Start: 2019-07-29 | End: 2019-07-29

## 2019-07-29 RX ORDER — DIPHENHYDRAMINE HYDROCHLORIDE 50 MG/ML
50 INJECTION, SOLUTION INTRAMUSCULAR; INTRAVENOUS AS NEEDED
Status: ACTIVE | OUTPATIENT
Start: 2019-07-29 | End: 2019-07-29

## 2019-07-29 RX ORDER — SODIUM CHLORIDE 0.9 % (FLUSH) 0.9 %
10 SYRINGE (ML) INJECTION AS NEEDED
Status: ACTIVE | OUTPATIENT
Start: 2019-07-29 | End: 2019-07-29

## 2019-07-29 RX ADMIN — SODIUM CHLORIDE 25 ML/HR: 900 INJECTION, SOLUTION INTRAVENOUS at 10:15

## 2019-07-29 RX ADMIN — Medication 10 ML: at 10:15

## 2019-07-29 RX ADMIN — PERTUZUMAB 420 MG: 30 INJECTION, SOLUTION, CONCENTRATE INTRAVENOUS at 11:24

## 2019-07-29 RX ADMIN — TRASTUZUMAB 369 MG: 150 INJECTION, POWDER, LYOPHILIZED, FOR SOLUTION INTRAVENOUS at 10:55

## 2019-07-29 NOTE — PROGRESS NOTES
7/29/2019 Saw the patient with Dr Ghislaine Loya. She is due for Herceptin/perjeta today. She will be due for echo at end of August.  She is doing well overall. She will complete treatment in October . She will be starting school in the near future for her masters in business. Will continue to follow.

## 2019-07-29 NOTE — PROGRESS NOTES
Arrived to the [facility]. Assessment completed and labs reviewed. Heceptin/Perjeta  completed. Patient tolerated well. Any issues or concerns during appointment: No.  Patient aware of next infusion appointment on 8/19/19 at 830am.  Discharged ambulatory.

## 2019-08-08 ENCOUNTER — HOSPITAL ENCOUNTER (OUTPATIENT)
Dept: RADIATION ONCOLOGY | Age: 29
Discharge: HOME | End: 2019-08-08
Payer: COMMERCIAL

## 2019-08-08 VITALS
BODY MASS INDEX: 22.34 KG/M2 | HEART RATE: 64 BPM | DIASTOLIC BLOOD PRESSURE: 59 MMHG | SYSTOLIC BLOOD PRESSURE: 135 MMHG | WEIGHT: 136.4 LBS | RESPIRATION RATE: 20 BRPM | TEMPERATURE: 98.4 F | OXYGEN SATURATION: 100 %

## 2019-08-08 PROCEDURE — 99211 OFF/OP EST MAY X REQ PHY/QHP: CPT

## 2019-08-08 NOTE — PROGRESS NOTES
RT RCW, Right sclav 07/06/2019    Right Lump then completion with expander    Chemo End 02/11/2019    Started Chemo in [place] , relocated  09/19/2019 Herceptin/Perjeta    Last Mammo: 09/18/2018      Cristina Collins, [address] [place] [address]

## 2019-08-08 NOTE — PROGRESS NOTES
Patient: Rubi Morris MRN: [id number]  SSN: [id number]    YOB: 1990  Age: 30 y.o. Sex: female      Other Providers:  Clarissa Jason MD, Daniel Chen MD ([address] at [facility] in [place])    [address] [place] [address] Avenue: Breast cancer    DIAGNOSIS: Right breast invasive ductal carcinoma, Stage IIB ([id number]), ER/OH/ HER2 positive    PREVIOUS TREATMENT:  1)  Right partial mastectomy and sentinel lymph node biopsy on 9/24/18  2) C1 adjuvant TCHP 10/22/18, planned to complete 6th cycle 2/11/19  3) Planned completion mastectomy with expander placement  4) Radiation right chest wall. Dose: 5000 cGy in 25 fractions right Sclav, 5000 cGy in 25 fractions right chest wall, 1000 cGy in 5 fractions right chest wall boost. TREATMENT DATES:  5/22/2019 - 7/5/2019      HISTORY OF PRESENT ILLNESS:  Rubi Morris is a 30 y.o. female seen initially by Dr. Jordi Garcia at the request of Dr. Lisa Rowell. She initially presented in August 2018 with a palpable mass in her right lateral breast.  Upon further questioning, it had been present for approximately 8 years. There were no associated nipple discharge or skin changes. Right breast US on 8/27/18 identified a 3.5 cm mass with no suspicious axillary lymph nodes. US guided core biopsy 9/11/18 revealed IDC, grade 3, measuring 13 mm in maximal linear dimension. She saw genetics 9/17/18 and underwent egg collection and preservation. She subsequently underwent right partial mastectomy and sentinel lymph node biopsy on 9/24/18 which revealed stage IIb ([id number]), ER/OH/ AR+, HER-2 FISH positive, IDC at 9 o'clock. Surgical margins were uninvolved by invasive carcinoma but DCIS was present at the lateral margin focally, 3 mm. A focus of DCIS was also found 1 mm from deep margin and 2 mm from inferior margin. A13 mm lymph node was also found in 1/9 lymph nodes.       Following surgery, she was seen by oncology, Dr. Daniel Chen, Oncologist at [facility] in [place], who recommended adjuvant chemotherapy. She started C1 adjuvant TCHP 10/22/18. She developed neutropenic fever requiring hospitalization from 12/9/18 - 12/12/18 after C3. No source was found, but viral URI was presumed. C4 was delayed one week due to 70K platelet count. C4 was administered on 12/31/18 with carbo dose reduced to 700 mg (AUC 4.85). The plan outlined by Dr. Leroy Sandoval included 6 cycles of TCHP, with continuation of dual Ab therapy every 3 weeks for a total of one year if tolerated, as well as, starting endocrine therapy (tamoxifen vs ovarian suppression). She was last seen by Dr. Nikunj Fleming on 1/21/19, and started cycle 5 of treatment the same day with plans to complete cycle six on 2/11/19 in [place]. However, she recently relocated to the [facility] and was seeking to establish local oncology care. She therefore met with Dr. Michael Villasenor 2/4/19 and also wished to have radiation delivered locally. As result, she was referred to our office for discussion and radiation planning. With the findings on her partial mastectomy, she does have a planned mastectomy with expanders to be placed early March. TREATMENT COURSE:  Selvin Davila did well without complaints week 1.  Week 2 with skin irritation and redness.  More discomfort in the axilla week 3. Bright erythema in the skin week 4.  Developed desquamation in the axilla although not moist along with bright erythema in the inframmary fold. Instructed to take advil for pain and hydrocortisone for itch. INTERVAL HISTORY:    8/8/19: Returns 1 month out from completion of radiation to right chest wall. She tolerated radiation well. She reports skin issues have resolved. At this time, she reports no breast issues. Energy recovering. Plans to complete x1 year Herceptin/Perjeta in October. Overall she is feeling well with no complaints. OBSTETRIC HISTORY:    Menarche at the age of 16. G0,P0.     Oral Contraceptive Pills: None  Hormone Replacement Therapy:  None    PAST MEDICAL HISTORY:    Past Medical History:   Diagnosis Date    Anxiety     Asthma     Cancer ([facility] [address] 09/2018    stage 2 triple positive ductal breast cancer     The patient denies history of collagen vascular diseases, pacemaker insertion, prior radiation or prior chemotherapy predating her breast cancer diagnosis. PAST SURGICAL HISTORY:   Past Surgical History:   Procedure Laterality Date    BREAST SURGERY PROCEDURE UNLISTED      HX BREAST LUMPECTOMY  09/2018    HX ORTHOPAEDIC Right 06/2009    knee surgery    HX ORTHOPAEDIC Left 06/2010    knee surgery    DE BIOPSY OF BREAST, NEEDLE CORE Right 09/2018     MEDICATIONS:     Current Outpatient Medications:     zolpidem (AMBIEN) 5 mg tablet, Take 5 mg by mouth nightly as needed for Sleep., Disp: , Rfl:     LORazepam (ATIVAN) 1 mg tablet, Take  by mouth every four (4) hours as needed for Anxiety. , Disp: , Rfl:     albuterol (PROVENTIL HFA, VENTOLIN HFA, PROAIR HFA) 90 mcg/actuation inhaler, Take  by inhalation as needed for Wheezing., Disp: , Rfl:     ALLERGIES:   No Known Allergies    SOCIAL HISTORY:   Social History     Socioeconomic History    Marital status: SINGLE     Spouse name: Not on file    Number of children: Not on file    Years of education: Not on file    Highest education level: Not on file   Occupational History    Not on file   Social Needs    Financial resource strain: Not on file    Food insecurity:     Worry: Not on file     Inability: Not on file    Transportation needs:     Medical: Not on file     Non-medical: Not on file   Tobacco Use    Smoking status: Never Smoker    Smokeless tobacco: Never Used   Substance and Sexual Activity    Alcohol use:  Yes     Alcohol/week: 4.0 standard drinks     Types: 2 Cans of beer, 2 Glasses of wine per week    Drug use: No    Sexual activity: Yes     Partners: Male     Birth control/protection: Condom   Lifestyle    Physical activity: Days per week: Not on file     Minutes per session: Not on file    Stress: Not on file   Relationships    Social connections:     Talks on phone: Not on file     Gets together: Not on file     Attends Gnosticism service: Not on file     Active member of club or organization: Not on file     Attends meetings of clubs or organizations: Not on file     Relationship status: Not on file    Intimate partner violence:     Fear of current or ex partner: Not on file     Emotionally abused: Not on file     Physically abused: Not on file     Forced sexual activity: Not on file   Other Topics Concern    Not on file   Social History Narrative    Not on file     FAMILY HISTORY:   Family History   Problem Relation Age of Onset    Cancer Mother         skin cancer    Cancer Father         skin cancer    Cancer Paternal Aunt         breast cancer     REVIEW OF SYSTEMS: Please see interval history    PHYSICAL EXAMINATION:   ECOG Performance status 0  VITAL SIGNS: Blood pressure 135/59  Pulse 64  Temperature 98.4  Weight 136.4     GENERAL: The patient is well-developed, ambulatory, alert and in no acute distress. LYMPHATIC: There is no cervical, supraclavicular or axillary lymphadenopathy bilaterally. MUSCULOSKELETAL: Extremities reveal no cyanosis, clubbing or edema.  is 5+/5. BREASTS: Examination of the unaffected breast reveals no dominant nodules or masses. Mastectomy site appears well healed. Mild hyperpigmentation.      PATHOLOGY:      SURGICAL PATHOOGY 9/11/2018         SURGICAL PATHOLOGY 9/24/2018            LABORATORY:   Lab Results   Component Value Date/Time    Sodium 138 07/29/2019 08:33 AM    Potassium 3.7 07/29/2019 08:33 AM    Chloride 107 07/29/2019 08:33 AM    CO2 24 07/29/2019 08:33 AM    Anion gap 7 07/29/2019 08:33 AM    Glucose 115 (H) 07/29/2019 08:33 AM    BUN 18 07/29/2019 08:33 AM    Creatinine 0.74 07/29/2019 08:33 AM    GFR est AA >60 07/29/2019 08:33 AM    GFR est non-AA >60 07/29/2019 08:33 AM    Calcium 8.7 07/29/2019 08:33 AM    Magnesium 2.2 06/17/2019 08:00 AM    Albumin 3.9 07/29/2019 08:33 AM    Protein, total 7.3 07/29/2019 08:33 AM    Globulin 3.4 07/29/2019 08:33 AM    A-G Ratio 1.1 (L) 07/29/2019 08:33 AM    AST (SGOT) 6 (L) 07/29/2019 08:33 AM    ALT (SGPT) 15 07/29/2019 08:33 AM     Lab Results   Component Value Date/Time    WBC 3.9 (L) 07/29/2019 08:33 AM    HGB 11.9 07/29/2019 08:33 AM    HCT 34.8 (L) 07/29/2019 08:33 AM    PLATELET 898 44/61/0271 08:33 AM       RADIOLOGY:    RIGHT BREAST/AXILLA ULTRASOUND 8/27/18  FINDINGS:       MRI BREAST B/L W CONTRAST 9/27/2018  FINDINGS:         IMPRESSION:  Caio Campuzano is a 30 y.o. female with Stage IIB breast cancer s/p bilateral mastectomy with placement of expanders. Completed adjuvant radiation or the right breast 7/5/2019. Ms Josselin Chavez is now 1 month out from completion of adjuvant radiation. She is recovering as anticipated from treatment. She continues with systemic therapy (Herceptin/Perjeta) under the direction of Dr. Mariela Mitchell with plans to complete 1 year of therapy in October. She reports no breast issues. Plans [place] in [place] spring of [place] Continue with light massage/lotion to right chest/axilla area. We discussed ongoing surveillance for her breast cancer s/p radiation therapy to include follow up every 6 months 1-2 years with continued follow up by medical oncology. All questions were answered to the best of my ability. Guerrero Arteaga NP  08/08/19     Portions of this note were copied from prior encounters and reviewed for accuracy, currency, and represent documentation and tasks completed during this encounter. I verify and attest these portions to be unchanged from prior visits.

## 2019-08-19 ENCOUNTER — HOSPITAL ENCOUNTER (OUTPATIENT)
Dept: INFUSION THERAPY | Age: 29
Discharge: HOME | End: 2019-08-19
Payer: COMMERCIAL

## 2019-08-19 VITALS
HEART RATE: 57 BPM | OXYGEN SATURATION: 100 % | WEIGHT: 139.8 LBS | TEMPERATURE: 98.3 F | BODY MASS INDEX: 22.9 KG/M2 | SYSTOLIC BLOOD PRESSURE: 126 MMHG | DIASTOLIC BLOOD PRESSURE: 62 MMHG | RESPIRATION RATE: 16 BRPM

## 2019-08-19 DIAGNOSIS — Z17.0 MALIGNANT NEOPLASM OF RIGHT BREAST IN FEMALE, ESTROGEN RECEPTOR POSITIVE, UNSPECIFIED SITE OF BREAST (HCC): Primary | ICD-10-CM

## 2019-08-19 DIAGNOSIS — C50.911 MALIGNANT NEOPLASM OF RIGHT BREAST IN FEMALE, ESTROGEN RECEPTOR POSITIVE, UNSPECIFIED SITE OF BREAST (HCC): Primary | ICD-10-CM

## 2019-08-19 PROCEDURE — 96413 CHEMO IV INFUSION 1 HR: CPT

## 2019-08-19 PROCEDURE — 74011250636 HC RX REV CODE- 250/636: Performed by: NURSE PRACTITIONER

## 2019-08-19 PROCEDURE — 96417 CHEMO IV INFUS EACH ADDL SEQ: CPT

## 2019-08-19 PROCEDURE — 74011250636 HC RX REV CODE- 250/636: Performed by: INTERNAL MEDICINE

## 2019-08-19 RX ORDER — HEPARIN 100 UNIT/ML
300-500 SYRINGE INTRAVENOUS AS NEEDED
Status: CANCELLED
Start: 2019-08-19

## 2019-08-19 RX ORDER — ALBUTEROL SULFATE 0.83 MG/ML
2.5 SOLUTION RESPIRATORY (INHALATION) AS NEEDED
Status: CANCELLED
Start: 2019-08-19

## 2019-08-19 RX ORDER — SODIUM CHLORIDE 9 MG/ML
10 INJECTION INTRAMUSCULAR; INTRAVENOUS; SUBCUTANEOUS AS NEEDED
Status: CANCELLED | OUTPATIENT
Start: 2019-08-19

## 2019-08-19 RX ORDER — DIPHENHYDRAMINE HYDROCHLORIDE 50 MG/ML
50 INJECTION, SOLUTION INTRAMUSCULAR; INTRAVENOUS AS NEEDED
Status: CANCELLED
Start: 2019-08-19

## 2019-08-19 RX ORDER — SODIUM CHLORIDE 9 MG/ML
25 INJECTION, SOLUTION INTRAVENOUS CONTINUOUS
Status: ACTIVE | OUTPATIENT
Start: 2019-08-19 | End: 2019-08-19

## 2019-08-19 RX ORDER — SODIUM CHLORIDE 0.9 % (FLUSH) 0.9 %
10-30 SYRINGE (ML) INJECTION AS NEEDED
Status: DISCONTINUED | OUTPATIENT
Start: 2019-08-19 | End: 2019-08-23 | Stop reason: HOSPADM

## 2019-08-19 RX ORDER — SODIUM CHLORIDE 0.9 % (FLUSH) 0.9 %
10 SYRINGE (ML) INJECTION AS NEEDED
Status: CANCELLED
Start: 2019-08-19

## 2019-08-19 RX ORDER — DIPHENHYDRAMINE HYDROCHLORIDE 50 MG/ML
50 INJECTION, SOLUTION INTRAMUSCULAR; INTRAVENOUS
Status: CANCELLED | OUTPATIENT
Start: 2019-08-19

## 2019-08-19 RX ORDER — HYDROCORTISONE SODIUM SUCCINATE 100 MG/2ML
100 INJECTION, POWDER, FOR SOLUTION INTRAMUSCULAR; INTRAVENOUS AS NEEDED
Status: CANCELLED | OUTPATIENT
Start: 2019-08-19

## 2019-08-19 RX ORDER — ONDANSETRON 2 MG/ML
8 INJECTION INTRAMUSCULAR; INTRAVENOUS AS NEEDED
Status: CANCELLED | OUTPATIENT
Start: 2019-08-19

## 2019-08-19 RX ORDER — EPINEPHRINE 1 MG/ML
0.3 INJECTION, SOLUTION, CONCENTRATE INTRAVENOUS AS NEEDED
Status: CANCELLED | OUTPATIENT
Start: 2019-08-19

## 2019-08-19 RX ORDER — ACETAMINOPHEN 325 MG/1
650 TABLET ORAL
Status: CANCELLED | OUTPATIENT
Start: 2019-08-19

## 2019-08-19 RX ORDER — ACETAMINOPHEN 325 MG/1
650 TABLET ORAL AS NEEDED
Status: CANCELLED
Start: 2019-08-19

## 2019-08-19 RX ADMIN — SODIUM CHLORIDE 25 ML/HR: 900 INJECTION, SOLUTION INTRAVENOUS at 09:45

## 2019-08-19 RX ADMIN — Medication 10 ML: at 09:00

## 2019-08-19 RX ADMIN — PERTUZUMAB 420 MG: 30 INJECTION, SOLUTION, CONCENTRATE INTRAVENOUS at 10:20

## 2019-08-19 RX ADMIN — TRASTUZUMAB 380 MG: 150 INJECTION, POWDER, LYOPHILIZED, FOR SOLUTION INTRAVENOUS at 09:50

## 2019-08-19 NOTE — PROGRESS NOTES
Arrived to the [facility]. Herceptin and Perjeta completed. Patient tolerated well.    Any issues or concerns during appointment: No  Patient aware of next infusion appointment on Monday,September 9th @ 1030  Discharged home ambulatory with boyfriend

## 2019-09-09 ENCOUNTER — HOSPITAL ENCOUNTER (OUTPATIENT)
Dept: LAB | Age: 29
Discharge: HOME | End: 2019-09-09
Payer: COMMERCIAL

## 2019-09-09 ENCOUNTER — PATIENT OUTREACH (OUTPATIENT)
Dept: CASE MANAGEMENT | Age: 29
End: 2019-09-09

## 2019-09-09 ENCOUNTER — HOSPITAL ENCOUNTER (OUTPATIENT)
Dept: INFUSION THERAPY | Age: 29
Discharge: HOME | End: 2019-09-09
Payer: COMMERCIAL

## 2019-09-09 DIAGNOSIS — C50.911 MALIGNANT NEOPLASM OF RIGHT BREAST IN FEMALE, ESTROGEN RECEPTOR POSITIVE, UNSPECIFIED SITE OF BREAST (HCC): Primary | ICD-10-CM

## 2019-09-09 DIAGNOSIS — C50.911 MALIGNANT NEOPLASM OF RIGHT BREAST IN FEMALE, ESTROGEN RECEPTOR POSITIVE, UNSPECIFIED SITE OF BREAST (HCC): ICD-10-CM

## 2019-09-09 DIAGNOSIS — Z17.0 MALIGNANT NEOPLASM OF RIGHT BREAST IN FEMALE, ESTROGEN RECEPTOR POSITIVE, UNSPECIFIED SITE OF BREAST (HCC): Primary | ICD-10-CM

## 2019-09-09 DIAGNOSIS — Z17.0 MALIGNANT NEOPLASM OF RIGHT BREAST IN FEMALE, ESTROGEN RECEPTOR POSITIVE, UNSPECIFIED SITE OF BREAST (HCC): ICD-10-CM

## 2019-09-09 LAB
ALBUMIN SERPL-MCNC: 4.1 G/DL (ref 3.5–5)
ALBUMIN/GLOB SERPL: 1.3 {RATIO} (ref 1.2–3.5)
ALP SERPL-CCNC: 66 U/L (ref 50–136)
ALT SERPL-CCNC: 20 U/L (ref 12–65)
ANION GAP SERPL CALC-SCNC: 7 MMOL/L (ref 7–16)
AST SERPL-CCNC: 9 U/L (ref 15–37)
BASOPHILS # BLD: 0 K/UL (ref 0–0.2)
BASOPHILS NFR BLD: 1 % (ref 0–2)
BILIRUB SERPL-MCNC: 0.3 MG/DL (ref 0.2–1.1)
BUN SERPL-MCNC: 12 MG/DL (ref 6–23)
CALCIUM SERPL-MCNC: 8.8 MG/DL (ref 8.3–10.4)
CHLORIDE SERPL-SCNC: 108 MMOL/L (ref 98–107)
CO2 SERPL-SCNC: 24 MMOL/L (ref 21–32)
CREAT SERPL-MCNC: 0.72 MG/DL (ref 0.6–1)
DIFFERENTIAL METHOD BLD: ABNORMAL
EOSINOPHIL # BLD: 0.3 K/UL (ref 0–0.8)
EOSINOPHIL NFR BLD: 5 % (ref 0.5–7.8)
ERYTHROCYTE [DISTWIDTH] IN BLOOD BY AUTOMATED COUNT: 11.9 % (ref 11.9–14.6)
GLOBULIN SER CALC-MCNC: 3.1 G/DL (ref 2.3–3.5)
GLUCOSE SERPL-MCNC: 101 MG/DL (ref 65–100)
HCT VFR BLD AUTO: 34.3 % (ref 35.8–46.3)
HGB BLD-MCNC: 11.5 G/DL (ref 11.7–15.4)
IMM GRANULOCYTES # BLD AUTO: 0 K/UL (ref 0–0.5)
IMM GRANULOCYTES NFR BLD AUTO: 0 % (ref 0–5)
LYMPHOCYTES # BLD: 1 K/UL (ref 0.5–4.6)
LYMPHOCYTES NFR BLD: 18 % (ref 13–44)
MCH RBC QN AUTO: 31.3 PG (ref 26.1–32.9)
MCHC RBC AUTO-ENTMCNC: 33.5 G/DL (ref 31.4–35)
MCV RBC AUTO: 93.2 FL (ref 79.6–97.8)
MONOCYTES # BLD: 0.3 K/UL (ref 0.1–1.3)
MONOCYTES NFR BLD: 6 % (ref 4–12)
NEUTS SEG # BLD: 3.6 K/UL (ref 1.7–8.2)
NEUTS SEG NFR BLD: 70 % (ref 43–78)
NRBC # BLD: 0 K/UL (ref 0–0.2)
PLATELET # BLD AUTO: 176 K/UL (ref 150–450)
PMV BLD AUTO: 10.1 FL (ref 9.4–12.3)
POTASSIUM SERPL-SCNC: 3.7 MMOL/L (ref 3.5–5.1)
PROT SERPL-MCNC: 7.2 G/DL (ref 6.3–8.2)
RBC # BLD AUTO: 3.68 M/UL (ref 4.05–5.25)
SODIUM SERPL-SCNC: 139 MMOL/L (ref 136–145)
WBC # BLD AUTO: 5.2 K/UL (ref 4.3–11.1)

## 2019-09-09 PROCEDURE — 96417 CHEMO IV INFUS EACH ADDL SEQ: CPT

## 2019-09-09 PROCEDURE — 74011250636 HC RX REV CODE- 250/636: Performed by: INTERNAL MEDICINE

## 2019-09-09 PROCEDURE — 85025 COMPLETE CBC W/AUTO DIFF WBC: CPT

## 2019-09-09 PROCEDURE — 80053 COMPREHEN METABOLIC PANEL: CPT

## 2019-09-09 PROCEDURE — 96413 CHEMO IV INFUSION 1 HR: CPT

## 2019-09-09 RX ORDER — SODIUM CHLORIDE 9 MG/ML
25 INJECTION, SOLUTION INTRAVENOUS CONTINUOUS
Status: ACTIVE | OUTPATIENT
Start: 2019-09-09 | End: 2019-09-09

## 2019-09-09 RX ORDER — SODIUM CHLORIDE 0.9 % (FLUSH) 0.9 %
10 SYRINGE (ML) INJECTION AS NEEDED
Status: ACTIVE | OUTPATIENT
Start: 2019-09-09 | End: 2019-09-09

## 2019-09-09 RX ADMIN — Medication 10 ML: at 12:47

## 2019-09-09 RX ADMIN — TRASTUZUMAB 376 MG: 150 INJECTION, POWDER, LYOPHILIZED, FOR SOLUTION INTRAVENOUS at 11:40

## 2019-09-09 RX ADMIN — PERTUZUMAB 420 MG: 30 INJECTION, SOLUTION, CONCENTRATE INTRAVENOUS at 12:16

## 2019-09-09 RX ADMIN — SODIUM CHLORIDE 25 ML/HR: 900 INJECTION, SOLUTION INTRAVENOUS at 11:06

## 2019-09-09 NOTE — PROGRESS NOTES
Herceptin/perjeta completed, pt tolerated well, refused to stay for 30 min monitoring, discharged home ambulatory

## 2019-09-09 NOTE — PROGRESS NOTES
Massage THERAPY: Daily Note    Referring Physician: Zohaib Gil MD  Medical/Referring Diagnosis: Breast cancer [facility]) [C50.919]   Precautions/Allergies: Patient has no known allergies. SUBJECTIVE:  Present Symptoms: None     Pre-Treatment Pain: 1/10   Neuropathy Scale:  1/10  Past Medical History:    Ms. Guille Clemons  has a past medical history of Anxiety, Asthma, and Cancer ([facility] [address] (09/2018). Ms. Guille Clemons  has a past surgical history that includes pr breast surgery procedure unlisted; hx breast lumpectomy (09/2018); pr biopsy of breast, needle core (Right, 09/2018); hx orthopaedic (Right, 06/2009); and hx orthopaedic (Left, 06/2010). Current Medications:       Current Outpatient Medications:     venlafaxine-SR (EFFEXOR XR) 37.5 mg capsule, Take 1 Cap by mouth daily for 30 days. Take daily for 7 (seven) days and then increase to 75 mg daily, Disp: 30 Cap, Rfl: 0    zolpidem (AMBIEN) 5 mg tablet, Take 5 mg by mouth nightly as needed for Sleep., Disp: , Rfl:     LORazepam (ATIVAN) 1 mg tablet, Take  by mouth every four (4) hours as needed for Anxiety. , Disp: , Rfl:     albuterol (PROVENTIL HFA, VENTOLIN HFA, PROAIR HFA) 90 mcg/actuation inhaler, Take  by inhalation as needed for Wheezing., Disp: , Rfl:     Current Facility-Administered Medications:     0.9% sodium chloride infusion, 25 mL/hr, IntraVENous, CONTINUOUS, Zohaib Gil MD, Last Rate: 25 mL/hr at 09/09/19 1106, 25 mL/hr at 09/09/19 1106    trastuzumab (HERCEPTIN) 376 mg in 0.9% sodium chloride 250 mL IVPB, 6 mg/kg, IntraVENous, ONCE, Zohaib Gil MD    pertuzumab (PERJETA) 420 mg in 0.9% sodium chloride 250 mL IVPB, 420 mg, IntraVENous, ONCE, Zohaib Gil MD    saline peripheral flush soln 10 mL, 10 mL, InterCATHeter, PRN, Zohaib Gil MD       OBJECTIVE/ASSESSMENT:  Observations of Patient:  No issues related to massage  Response To Treatment: More relaxed, enjoys foot massage   Post-Treatment Pain: 1/10   Neuropathy Scale: 1/10  TREATMENT:    (In addition to Assessment/Re-Assessment sessions the following treatments were rendered)  Treatment Provided:  [x]  Soft tissue massage  []  Healing Touch   Location: lower leg(s) bilaterally and feet  Patient Position: Seated  Time: 20 minutes    PLAN OF CARE:    []  I will follow up with this patient as needed. [x]  No follow up visit necessary.     Thank you for this referral.  Elver Bob LMT

## 2019-09-09 NOTE — PROGRESS NOTES
Clinical Social Work Note  Name: Ramesh Barrett    : 1990    MRN: [id number]    Date of Service: 2019    Type of Service: Case Management     Length of Service: 15 minutes    Patient Diagnosis:   1. Malignant neoplasm of right breast in female, estrogen receptor positive, unspecified site of breast [facility])         Referral Source: SW    Reason for Visit: Infusion    CM Note: Seen patient with her  during infusion. SW introduced self, presented Support Services Program and others. Pt requested information on young patient with cancer. LISW-CP gave info on AYA. At this moment, pt denies economic needs.  Note: LISW-CP discussed Distress by using NCCN Guidelines for Patients, Distress with patient and her . LISW-CP overviewed relaxation and practiced breathing with the patient. Booklet addressing relaxation and breathing techniques were given. Protective Factors: Current care for physical and mental illness, adequate insight and judgment, family support, cultural and Adventism beliefs and values that support self-care. Next Steps: LISW-CP gave contact information and encouraged pt to call should any needs arise. Pt verbalized understanding. SW intends to follow up as needed. LISW-CP to Liliam cisneros, ERIC and Argentina Villanueva, ERIC re: patient request to be involved with young woman with breast cancer and young individuals with cancer.        3 most recent PHQ Screens 2019   Little interest or pleasure in doing things More than half the days Not at all Not at all   Feeling down, depressed, irritable, or hopeless More than half the days Not at all Not at all   Total Score PHQ 2 4 0 0           Electronically Signed By:  Preeti Ge

## 2019-09-09 NOTE — PROGRESS NOTES
9/9/2019 Saw the patient with Dr Jonh Castellano. She had an echo 8/26 with EF of 60-65%. Dr Jonh Castellano discussed with the patient hormone blocking treatment with either Tamoxifen or Zoladex/Arimidex. The patient does desire to have children in the future. They do have embryos frozen already. Dr Charly Clark is her fertility doctor. She is going to [facility] on the 26th to meet with plastics and surgery. Will continue to follow.

## 2019-09-17 PROBLEM — D05.11 INTRADUCTAL CARCINOMA IN SITU OF RIGHT BREAST: Status: ACTIVE | Noted: 2019-09-09

## 2019-09-17 PROBLEM — C77.3 SECONDARY AND UNSPECIFIED MALIGNANT NEOPLASM OF AXILLA AND UPPER LIMB LYMPH NODES (HCC): Status: ACTIVE | Noted: 2019-09-09

## 2019-09-17 PROBLEM — C50.411 MALIGNANT NEOPLASM OF UPPER-OUTER QUADRANT OF RIGHT BREAST IN FEMALE, ESTROGEN RECEPTOR POSITIVE (HCC): Status: ACTIVE | Noted: 2019-02-04

## 2019-09-30 ENCOUNTER — HOSPITAL ENCOUNTER (OUTPATIENT)
Dept: INFUSION THERAPY | Age: 29
Discharge: HOME | End: 2019-09-30
Payer: COMMERCIAL

## 2019-09-30 VITALS
BODY MASS INDEX: 22.27 KG/M2 | DIASTOLIC BLOOD PRESSURE: 64 MMHG | TEMPERATURE: 97.5 F | WEIGHT: 133.8 LBS | SYSTOLIC BLOOD PRESSURE: 122 MMHG | OXYGEN SATURATION: 100 % | HEART RATE: 55 BPM | RESPIRATION RATE: 16 BRPM

## 2019-09-30 DIAGNOSIS — Z17.0 MALIGNANT NEOPLASM OF UPPER-OUTER QUADRANT OF RIGHT BREAST IN FEMALE, ESTROGEN RECEPTOR POSITIVE (HCC): Primary | ICD-10-CM

## 2019-09-30 DIAGNOSIS — C50.411 MALIGNANT NEOPLASM OF UPPER-OUTER QUADRANT OF RIGHT BREAST IN FEMALE, ESTROGEN RECEPTOR POSITIVE (HCC): Primary | ICD-10-CM

## 2019-09-30 PROCEDURE — 96417 CHEMO IV INFUS EACH ADDL SEQ: CPT

## 2019-09-30 PROCEDURE — 74011250636 HC RX REV CODE- 250/636: Performed by: NURSE PRACTITIONER

## 2019-09-30 PROCEDURE — 96413 CHEMO IV INFUSION 1 HR: CPT

## 2019-09-30 RX ORDER — DIPHENHYDRAMINE HYDROCHLORIDE 50 MG/ML
50 INJECTION, SOLUTION INTRAMUSCULAR; INTRAVENOUS
Status: CANCELLED | OUTPATIENT
Start: 2019-09-30

## 2019-09-30 RX ORDER — ALBUTEROL SULFATE 0.83 MG/ML
2.5 SOLUTION RESPIRATORY (INHALATION) AS NEEDED
Status: CANCELLED
Start: 2019-09-30

## 2019-09-30 RX ORDER — SODIUM CHLORIDE 9 MG/ML
25 INJECTION, SOLUTION INTRAVENOUS CONTINUOUS
Status: ACTIVE | OUTPATIENT
Start: 2019-09-30 | End: 2019-09-30

## 2019-09-30 RX ORDER — DIPHENHYDRAMINE HYDROCHLORIDE 50 MG/ML
50 INJECTION, SOLUTION INTRAMUSCULAR; INTRAVENOUS AS NEEDED
Status: CANCELLED
Start: 2019-09-30

## 2019-09-30 RX ORDER — SODIUM CHLORIDE 9 MG/ML
10 INJECTION INTRAMUSCULAR; INTRAVENOUS; SUBCUTANEOUS AS NEEDED
Status: ACTIVE | OUTPATIENT
Start: 2019-09-30 | End: 2019-09-30

## 2019-09-30 RX ORDER — ONDANSETRON 2 MG/ML
8 INJECTION INTRAMUSCULAR; INTRAVENOUS AS NEEDED
Status: CANCELLED | OUTPATIENT
Start: 2019-09-30

## 2019-09-30 RX ORDER — HEPARIN 100 UNIT/ML
300-500 SYRINGE INTRAVENOUS AS NEEDED
Status: CANCELLED
Start: 2019-09-30

## 2019-09-30 RX ORDER — ACETAMINOPHEN 325 MG/1
650 TABLET ORAL
Status: CANCELLED | OUTPATIENT
Start: 2019-09-30

## 2019-09-30 RX ORDER — SODIUM CHLORIDE 0.9 % (FLUSH) 0.9 %
10 SYRINGE (ML) INJECTION AS NEEDED
Status: CANCELLED
Start: 2019-09-30

## 2019-09-30 RX ORDER — HYDROCORTISONE SODIUM SUCCINATE 100 MG/2ML
100 INJECTION, POWDER, FOR SOLUTION INTRAMUSCULAR; INTRAVENOUS AS NEEDED
Status: CANCELLED | OUTPATIENT
Start: 2019-09-30

## 2019-09-30 RX ORDER — EPINEPHRINE 1 MG/ML
0.3 INJECTION, SOLUTION, CONCENTRATE INTRAVENOUS AS NEEDED
Status: CANCELLED | OUTPATIENT
Start: 2019-09-30

## 2019-09-30 RX ORDER — ACETAMINOPHEN 325 MG/1
650 TABLET ORAL AS NEEDED
Status: CANCELLED
Start: 2019-09-30

## 2019-09-30 RX ADMIN — SODIUM CHLORIDE 25 ML/HR: 900 INJECTION, SOLUTION INTRAVENOUS at 09:10

## 2019-09-30 RX ADMIN — TRASTUZUMAB 364 MG: 150 INJECTION, POWDER, LYOPHILIZED, FOR SOLUTION INTRAVENOUS at 09:46

## 2019-09-30 RX ADMIN — PERTUZUMAB 420 MG: 30 INJECTION, SOLUTION, CONCENTRATE INTRAVENOUS at 10:27

## 2019-09-30 NOTE — PROGRESS NOTES
Arrived to the [facility]. Herceptin/Perjeta completed. Patient tolerated well. Any issues or concerns during appointment: Zoledex ordered in treatment for administration today but patient requested to speak with Dr Rose Marie Beaulieu who is not here Zoledex held. Patient aware of next infusion appointment on 10.21.2019 (date) at 0830 (time). Discharged ambulatory to home with spouse.

## 2019-09-30 NOTE — PROGRESS NOTES
Social Work Progress Note  Name: Ti Cash  : 1990  MRN: [id number]  Date of Service: 2019    Length of Service: 15 minutes    Patient Diagnosis:   1. Malignant neoplasm of upper-outer quadrant of right breast in female, estrogen receptor positive ([facility] [address]        Reason for Visit: F/U    Subjective: Seen patient during infusion. Patient was alert ans oriented. [facility]- provided validation and sustainment as patient vent about emotional and physical distress. [facility]- discussed with patient symptoms of distress, causes, triggers and coping skills. Pt denies any other psychosocial needs at this time. Pt declined assistance with individual therapy. Patient is still looking for \"young\" group support. [facility] has spoken with the AYA RN,Torie Alexandra Diggs. AYA RN, contacted patient already. Protective Factors: Current care for physical and mental illness, adequate insight and judgment, family support, cultural and Quaker beliefs and values that support self-care. Patient did not report suicidal ideations, intent or plans. Patient did not report homicidal ideations, intent or plans. Patient is oriented to self, place, time and situation. Next Steps: [facility] gave contact information and encouraged pt to call should any needs arise. Pt verbalized understanding. [facility] intends to follow up as needed.       3 most recent PHQ Screens 2019   Little interest or pleasure in doing things More than half the days Not at all Not at all   Feeling down, depressed, irritable, or hopeless More than half the days Not at all Not at all   Total Score PHQ 2 4 0 0         Alanna Aguayo 112, SARAH

## 2019-09-30 NOTE — PROGRESS NOTES
Massage THERAPY: Daily Note    Referring Physician: Blessing Sue MD  Medical/Referring Diagnosis: Breast cancer [facility]) [C50.919]   Precautions/Allergies: Patient has no known allergies. SUBJECTIVE:  Present Symptoms: No symptoms, enjoys massage     Pre-Treatment Pain: 1/10   Neuropathy Scale:  1/10  Past Medical History:    Ms. Divya Scott  has a past medical history of Anxiety, Asthma, and Cancer ([address] (09/2018). Ms. Divya Scott  has a past surgical history that includes pr breast surgery procedure unlisted; hx breast lumpectomy (09/2018); pr biopsy of breast, needle core (Right, 09/2018); hx orthopaedic (Right, 06/2009); and hx orthopaedic (Left, 06/2010). Current Medications:       Current Outpatient Medications:     venlafaxine-SR (EFFEXOR XR) 37.5 mg capsule, Take 1 Cap by mouth daily for 30 days. Take daily for 7 (seven) days and then increase to 75 mg daily, Disp: 30 Cap, Rfl: 0    zolpidem (AMBIEN) 5 mg tablet, Take 5 mg by mouth nightly as needed for Sleep., Disp: , Rfl:     LORazepam (ATIVAN) 1 mg tablet, Take  by mouth every four (4) hours as needed for Anxiety. , Disp: , Rfl:     albuterol (PROVENTIL HFA, VENTOLIN HFA, PROAIR HFA) 90 mcg/actuation inhaler, Take  by inhalation as needed for Wheezing., Disp: , Rfl:     Current Facility-Administered Medications:     0.9% sodium chloride infusion, 25 mL/hr, IntraVENous, CONTINUOUS, Freida Blanco NP, Last Rate: 25 mL/hr at 09/30/19 0910, 25 mL/hr at 09/30/19 0910    pertuzumab (PERJETA) 420 mg in 0.9% sodium chloride 250 mL IVPB, 420 mg, IntraVENous, ONCE, Freida Blanco NP, Last Rate: 578 mL/hr at 09/30/19 1027, 420 mg at 09/30/19 1027    sodium chloride 0.9% injection 10 mL, 10 mL, IntraVENous, PRN, Freida Blanco NP       OBJECTIVE/ASSESSMENT:  Observations of Patient:  No issues related to massage  Response To Treatment: More relaxed   Post-Treatment Pain: 1/10   Neuropathy Scale:  1/10  TREATMENT:    (In addition to Assessment/Re-Assessment sessions the following treatments were rendered)  Treatment Provided:  [x]  Soft tissue massage  []  Healing Touch   Location: lower leg(s) bilaterally and feet  Patient Position: Seated  Time: 20 minutes    PLAN OF CARE:    []  I will follow up with this patient as needed. [x]  No follow up visit necessary.     Thank you for this referral.  Jay Jay Hastings LMT

## 2019-10-21 ENCOUNTER — HOSPITAL ENCOUNTER (OUTPATIENT)
Dept: INFUSION THERAPY | Age: 29
Discharge: HOME | End: 2019-10-21
Payer: COMMERCIAL

## 2019-10-21 ENCOUNTER — HOSPITAL ENCOUNTER (OUTPATIENT)
Dept: LAB | Age: 29
Discharge: HOME | End: 2019-10-21
Payer: COMMERCIAL

## 2019-10-21 ENCOUNTER — PATIENT OUTREACH (OUTPATIENT)
Dept: CASE MANAGEMENT | Age: 29
End: 2019-10-21

## 2019-10-21 VITALS — HEIGHT: 65 IN | BODY MASS INDEX: 22.05 KG/M2

## 2019-10-21 DIAGNOSIS — Z17.0 MALIGNANT NEOPLASM OF UPPER-OUTER QUADRANT OF RIGHT BREAST IN FEMALE, ESTROGEN RECEPTOR POSITIVE (HCC): ICD-10-CM

## 2019-10-21 DIAGNOSIS — C50.411 MALIGNANT NEOPLASM OF UPPER-OUTER QUADRANT OF RIGHT BREAST IN FEMALE, ESTROGEN RECEPTOR POSITIVE (HCC): Primary | ICD-10-CM

## 2019-10-21 DIAGNOSIS — C50.411 MALIGNANT NEOPLASM OF UPPER-OUTER QUADRANT OF RIGHT BREAST IN FEMALE, ESTROGEN RECEPTOR POSITIVE (HCC): ICD-10-CM

## 2019-10-21 DIAGNOSIS — Z17.0 MALIGNANT NEOPLASM OF UPPER-OUTER QUADRANT OF RIGHT BREAST IN FEMALE, ESTROGEN RECEPTOR POSITIVE (HCC): Primary | ICD-10-CM

## 2019-10-21 LAB
ALBUMIN SERPL-MCNC: 3.8 G/DL (ref 3.5–5)
ALBUMIN/GLOB SERPL: 1.2 {RATIO} (ref 1.2–3.5)
ALP SERPL-CCNC: 71 U/L (ref 50–136)
ALT SERPL-CCNC: 19 U/L (ref 12–65)
ANION GAP SERPL CALC-SCNC: 8 MMOL/L (ref 7–16)
AST SERPL-CCNC: 10 U/L (ref 15–37)
BASOPHILS # BLD: 0 K/UL (ref 0–0.2)
BASOPHILS NFR BLD: 1 % (ref 0–2)
BILIRUB SERPL-MCNC: 0.3 MG/DL (ref 0.2–1.1)
BUN SERPL-MCNC: 12 MG/DL (ref 6–23)
CALCIUM SERPL-MCNC: 8.7 MG/DL (ref 8.3–10.4)
CHLORIDE SERPL-SCNC: 107 MMOL/L (ref 98–107)
CO2 SERPL-SCNC: 25 MMOL/L (ref 21–32)
CREAT SERPL-MCNC: 0.7 MG/DL (ref 0.6–1)
DIFFERENTIAL METHOD BLD: ABNORMAL
EOSINOPHIL # BLD: 0.3 K/UL (ref 0–0.8)
EOSINOPHIL NFR BLD: 7 % (ref 0.5–7.8)
ERYTHROCYTE [DISTWIDTH] IN BLOOD BY AUTOMATED COUNT: 11.9 % (ref 11.9–14.6)
GLOBULIN SER CALC-MCNC: 3.2 G/DL (ref 2.3–3.5)
GLUCOSE SERPL-MCNC: 85 MG/DL (ref 65–100)
HCT VFR BLD AUTO: 36.1 % (ref 35.8–46.3)
HGB BLD-MCNC: 12.1 G/DL (ref 11.7–15.4)
IMM GRANULOCYTES # BLD AUTO: 0 K/UL (ref 0–0.5)
IMM GRANULOCYTES NFR BLD AUTO: 0 % (ref 0–5)
LYMPHOCYTES # BLD: 1.1 K/UL (ref 0.5–4.6)
LYMPHOCYTES NFR BLD: 24 % (ref 13–44)
MCH RBC QN AUTO: 31.3 PG (ref 26.1–32.9)
MCHC RBC AUTO-ENTMCNC: 33.5 G/DL (ref 31.4–35)
MCV RBC AUTO: 93.3 FL (ref 79.6–97.8)
MONOCYTES # BLD: 0.4 K/UL (ref 0.1–1.3)
MONOCYTES NFR BLD: 8 % (ref 4–12)
NEUTS SEG # BLD: 2.8 K/UL (ref 1.7–8.2)
NEUTS SEG NFR BLD: 61 % (ref 43–78)
NRBC # BLD: 0 K/UL (ref 0–0.2)
PLATELET # BLD AUTO: 190 K/UL (ref 150–450)
PMV BLD AUTO: 11.3 FL (ref 9.4–12.3)
POTASSIUM SERPL-SCNC: 3.9 MMOL/L (ref 3.5–5.1)
PROT SERPL-MCNC: 7 G/DL (ref 6.3–8.2)
RBC # BLD AUTO: 3.87 M/UL (ref 4.05–5.25)
SODIUM SERPL-SCNC: 140 MMOL/L (ref 136–145)
WBC # BLD AUTO: 4.6 K/UL (ref 4.3–11.1)

## 2019-10-21 PROCEDURE — 96417 CHEMO IV INFUS EACH ADDL SEQ: CPT

## 2019-10-21 PROCEDURE — 96413 CHEMO IV INFUSION 1 HR: CPT

## 2019-10-21 PROCEDURE — 74011250636 HC RX REV CODE- 250/636: Performed by: NURSE PRACTITIONER

## 2019-10-21 PROCEDURE — 85025 COMPLETE CBC W/AUTO DIFF WBC: CPT

## 2019-10-21 PROCEDURE — 80053 COMPREHEN METABOLIC PANEL: CPT

## 2019-10-21 RX ORDER — SODIUM CHLORIDE 0.9 % (FLUSH) 0.9 %
10 SYRINGE (ML) INJECTION AS NEEDED
Status: ACTIVE | OUTPATIENT
Start: 2019-10-21 | End: 2019-10-21

## 2019-10-21 RX ORDER — SODIUM CHLORIDE 9 MG/ML
25 INJECTION, SOLUTION INTRAVENOUS CONTINUOUS
Status: ACTIVE | OUTPATIENT
Start: 2019-10-21 | End: 2019-10-21

## 2019-10-21 RX ADMIN — TRASTUZUMAB 361 MG: 150 INJECTION, POWDER, LYOPHILIZED, FOR SOLUTION INTRAVENOUS at 10:45

## 2019-10-21 RX ADMIN — Medication 10 ML: at 11:50

## 2019-10-21 RX ADMIN — PERTUZUMAB 420 MG: 30 INJECTION, SOLUTION, CONCENTRATE INTRAVENOUS at 11:20

## 2019-10-21 RX ADMIN — SODIUM CHLORIDE 25 ML/HR: 900 INJECTION, SOLUTION INTRAVENOUS at 10:25

## 2019-10-21 RX ADMIN — Medication 10 ML: at 11:18

## 2019-10-21 RX ADMIN — Medication 10 ML: at 10:25

## 2019-10-21 NOTE — PROGRESS NOTES
AYA  Check-in Notes     LS met with pt during infusion to introduce AYA program per RN referral. LS met with pt and pt's SO. Pt stated she is seeking a program/activities to be around pt's her own age. LS introduced AYA activity group and educated pt on quarterly meetings. Pt expressed interest. Pt gave LS contact information to be added to AYA pt list. LS assessed for needs. Pt and SO stated they would like more information on grants/resources for lodging/food to assist with costs of upcoming trip for procedure. LS told pt LS will consult with SW and follow up once information is gathered. LS will consult with SW and CM and follow up with pt asap.

## 2019-10-21 NOTE — PROGRESS NOTES
10/21/2019 Saw the patient with Maria Dolores Ortiz NP. She is due for her last Herceptin. She will have surgery in [place] toward the end of November. She will follow up after surgery. Will continue to follow.

## 2019-10-29 ENCOUNTER — HOSPITAL ENCOUNTER (OUTPATIENT)
Dept: INTERVENTIONAL RADIOLOGY/VASCULAR | Age: 29
Discharge: HOME | End: 2019-10-29
Attending: INTERNAL MEDICINE
Payer: COMMERCIAL

## 2019-10-29 VITALS
WEIGHT: 132 LBS | DIASTOLIC BLOOD PRESSURE: 62 MMHG | OXYGEN SATURATION: 97 % | SYSTOLIC BLOOD PRESSURE: 133 MMHG | RESPIRATION RATE: 17 BRPM | TEMPERATURE: 97.8 F | BODY MASS INDEX: 21.97 KG/M2 | HEART RATE: 51 BPM

## 2019-10-29 DIAGNOSIS — Z17.0 MALIGNANT NEOPLASM OF UPPER-OUTER QUADRANT OF RIGHT BREAST IN FEMALE, ESTROGEN RECEPTOR POSITIVE (HCC): ICD-10-CM

## 2019-10-29 DIAGNOSIS — C50.411 MALIGNANT NEOPLASM OF UPPER-OUTER QUADRANT OF RIGHT BREAST IN FEMALE, ESTROGEN RECEPTOR POSITIVE (HCC): ICD-10-CM

## 2019-10-29 PROCEDURE — 36590 REMOVAL TUNNELED CV CATH: CPT

## 2019-10-29 PROCEDURE — 74011250637 HC RX REV CODE- 250/637: Performed by: RADIOLOGY

## 2019-10-29 PROCEDURE — 74011000250 HC RX REV CODE- 250: Performed by: RADIOLOGY

## 2019-10-29 PROCEDURE — 77030031131 HC SUT MXN P COVD -B

## 2019-10-29 PROCEDURE — 77030002983 HC SUT POLYSRB COVD -A

## 2019-10-29 PROCEDURE — 77030037400 HC ADH TISS HI VISC EXOFIN CHMP -B

## 2019-10-29 RX ORDER — DIAZEPAM 5 MG/1
5 TABLET ORAL ONCE
Status: COMPLETED | OUTPATIENT
Start: 2019-10-29 | End: 2019-10-29

## 2019-10-29 RX ORDER — LIDOCAINE HYDROCHLORIDE 20 MG/ML
1-15 INJECTION, SOLUTION INFILTRATION; PERINEURAL ONCE
Status: COMPLETED | OUTPATIENT
Start: 2019-10-29 | End: 2019-10-29

## 2019-10-29 RX ORDER — OXYCODONE HYDROCHLORIDE 5 MG/1
5 TABLET ORAL ONCE
Status: COMPLETED | OUTPATIENT
Start: 2019-10-29 | End: 2019-10-29

## 2019-10-29 RX ADMIN — LIDOCAINE HYDROCHLORIDE 200 MG: 20 INJECTION, SOLUTION INFILTRATION; PERINEURAL at 10:38

## 2019-10-29 RX ADMIN — LIDOCAINE HYDROCHLORIDE 100 MG: 10; .005 INJECTION, SOLUTION EPIDURAL; INFILTRATION; INTRACAUDAL; PERINEURAL at 10:38

## 2019-10-29 RX ADMIN — OXYCODONE HYDROCHLORIDE 5 MG: 5 TABLET ORAL at 10:05

## 2019-10-29 RX ADMIN — DIAZEPAM 5 MG: 5 TABLET ORAL at 10:05

## 2019-10-29 NOTE — DISCHARGE INSTRUCTIONS
[address]  Department of Interventional Radiology  [facility]  [phone] Office  [phone] Fax    DRAIN/PORT/CATHETER REMOVAL  DISCHARGE INSTRUCTIONS    General Information:     Your doctor has ordered for us to remove your drain, port, or catheter. This could be that you do not need it anymore, it is not doing its job, your physician has decided on another plan for your treatment and/or it may need replacing. Home Care Instructions: You can resume your regular diet and medication regimen. Do not drink alcohol, drive, or make any important legal decisions in the next 24 hours. Do not lift anything heavier than a gallon of milk, or do anything strenuous for the next 24 hours. You will notice a dressing over the site of the removal. This dressing should stay in place until the site is healed. The dressing should be changed at least every 48 hours. You should change the dressing sooner if it becomes soiled or wet. The nurse who discharges you to home should review with you any wound care instructions. Resume your normal level of activity slowly. You may shower after 24 hours, but do not take a bath, swim or immerse yourself in water until the site has healed, and keep the dressing dry with plastic wrap while showering. The site may ooze for a couple of days. This drainage should lessen with each passing day. Call If:     You should call your Physician and/or the Radiology Nurse if you have any bleeding other than a small spot on your bandage. Call if you have any signs of infection, fever, or increased pain at the site of the tube. Call if the oozing increases, if it changes color, or begins to have an odor. Follow-Up Instructions: Please see your ordering doctor as he/she has requested. To Reach Us: If you have any questions about your procedure, please call the Interventional Radiology department at [phone].  After business hours (5pm) and weekends, call the answering service at [phone] and ask for the Radiologist on call to be paged. Interventional Radiology General Nurse Discharge    After general anesthesia or intravenous sedation, for 24 hours or while taking prescription Narcotics:  · Limit your activities  · Do not drive and operate hazardous machinery  · Do not make important personal or business decisions  · Do  not drink alcoholic beverages  · If you have not urinated within 8 hours after discharge, please contact your surgeon on call. * Please give a list of your current medications to your Primary Care Provider. * Please update this list whenever your medications are discontinued, doses are     changed, or new medications (including over-the-counter products) are added. * Please carry medication information at all times in case of emergency situations. These are general instructions for a healthy lifestyle:    No smoking/ No tobacco products/ Avoid exposure to second hand smoke  Surgeon General's Warning:  Quitting smoking now greatly reduces serious risk to your health. Obesity, smoking, and sedentary lifestyle greatly increases your risk for illness  A healthy diet, regular physical exercise & weight monitoring are important for maintaining a healthy lifestyle    You may be retaining fluid if you have a history of heart failure or if you experience any of the following symptoms:  Weight gain of 3 pounds or more overnight or 5 pounds in a week, increased swelling in our hands or feet or shortness of breath while lying flat in bed. Please call your doctor as soon as you notice any of these symptoms; do not wait until your next office visit.     Recognize signs and symptoms of STROKE:  F-face looks uneven    A-arms unable to move or move unevenly    S-speech slurred or non-existent    T-time-call 911 as soon as signs and symptoms begin-DO NOT go       Back to bed or wait to see if you get better-TIME IS BRAIN.             Patient Signature:  Date: 10/29/2019  Discharging Nurse: Vicky Sanchez

## 2019-10-29 NOTE — PROCEDURES
Department of Interventional Radiology  [phone]        Interventional Radiology Brief Procedure Note    Patient: Harper Rodriguez MRN: [id number]  SSN: [id number]    YOB: 1990  Age: 30 y.o. Sex: female      Date of Procedure: 10/29/2019    Pre-Procedure Diagnosis: Left chest port no longer needed. Left mastectomy planned. Post-Procedure Diagnosis: SAME    Procedure(s): Venous Chest Port Removal    Brief Description of Procedure: as above    Performed By: Christina Phillips MD     Assistants: None    Anesthesia:Lidocaine    Estimated Blood Loss: Less than 10ml    Specimens:  None    Implants:  None    Findings: No cellulitis. Incision from port and incision from mole removal are both well healed. Complications: None    Recommendations: Routine wound care. Follow Up: PRN.      Signed By: Chrisitna Phillips MD     October 29, 2019

## 2019-12-09 ENCOUNTER — PATIENT OUTREACH (OUTPATIENT)
Dept: CASE MANAGEMENT | Age: 29
End: 2019-12-09

## 2019-12-09 ENCOUNTER — HOSPITAL ENCOUNTER (OUTPATIENT)
Dept: LAB | Age: 29
Discharge: HOME | End: 2019-12-09
Payer: COMMERCIAL

## 2019-12-09 DIAGNOSIS — Z17.0 MALIGNANT NEOPLASM OF UPPER-OUTER QUADRANT OF RIGHT BREAST IN FEMALE, ESTROGEN RECEPTOR POSITIVE (HCC): ICD-10-CM

## 2019-12-09 DIAGNOSIS — C50.411 MALIGNANT NEOPLASM OF UPPER-OUTER QUADRANT OF RIGHT BREAST IN FEMALE, ESTROGEN RECEPTOR POSITIVE (HCC): ICD-10-CM

## 2019-12-09 LAB
ALBUMIN SERPL-MCNC: 3.9 G/DL (ref 3.5–5)
ALBUMIN/GLOB SERPL: 1 {RATIO} (ref 1.2–3.5)
ALP SERPL-CCNC: 84 U/L (ref 50–136)
ALT SERPL-CCNC: 26 U/L (ref 12–65)
ANION GAP SERPL CALC-SCNC: 3 MMOL/L (ref 7–16)
AST SERPL-CCNC: 8 U/L (ref 15–37)
BASOPHILS # BLD: 0 K/UL (ref 0–0.2)
BASOPHILS NFR BLD: 1 % (ref 0–2)
BILIRUB SERPL-MCNC: 0.3 MG/DL (ref 0.2–1.1)
BUN SERPL-MCNC: 10 MG/DL (ref 6–23)
CALCIUM SERPL-MCNC: 9.3 MG/DL (ref 8.3–10.4)
CHLORIDE SERPL-SCNC: 106 MMOL/L (ref 98–107)
CO2 SERPL-SCNC: 29 MMOL/L (ref 21–32)
CREAT SERPL-MCNC: 0.74 MG/DL (ref 0.6–1)
DIFFERENTIAL METHOD BLD: NORMAL
EOSINOPHIL # BLD: 0.4 K/UL (ref 0–0.8)
EOSINOPHIL NFR BLD: 7 % (ref 0.5–7.8)
ERYTHROCYTE [DISTWIDTH] IN BLOOD BY AUTOMATED COUNT: 11.9 % (ref 11.9–14.6)
GLOBULIN SER CALC-MCNC: 3.9 G/DL (ref 2.3–3.5)
GLUCOSE SERPL-MCNC: 94 MG/DL (ref 65–100)
HCT VFR BLD AUTO: 37.5 % (ref 35.8–46.3)
HGB BLD-MCNC: 12.5 G/DL (ref 11.7–15.4)
IMM GRANULOCYTES # BLD AUTO: 0 K/UL (ref 0–0.5)
IMM GRANULOCYTES NFR BLD AUTO: 0 % (ref 0–5)
LYMPHOCYTES # BLD: 1 K/UL (ref 0.5–4.6)
LYMPHOCYTES NFR BLD: 20 % (ref 13–44)
MCH RBC QN AUTO: 30.7 PG (ref 26.1–32.9)
MCHC RBC AUTO-ENTMCNC: 33.3 G/DL (ref 31.4–35)
MCV RBC AUTO: 92.1 FL (ref 79.6–97.8)
MONOCYTES # BLD: 0.3 K/UL (ref 0.1–1.3)
MONOCYTES NFR BLD: 7 % (ref 4–12)
NEUTS SEG # BLD: 3.2 K/UL (ref 1.7–8.2)
NEUTS SEG NFR BLD: 65 % (ref 43–78)
NRBC # BLD: 0 K/UL (ref 0–0.2)
PLATELET # BLD AUTO: 303 K/UL (ref 150–450)
PMV BLD AUTO: 9.8 FL (ref 9.4–12.3)
POTASSIUM SERPL-SCNC: 4.6 MMOL/L (ref 3.5–5.1)
PROT SERPL-MCNC: 7.8 G/DL (ref 6.3–8.2)
RBC # BLD AUTO: 4.07 M/UL (ref 4.05–5.25)
SODIUM SERPL-SCNC: 138 MMOL/L (ref 136–145)
WBC # BLD AUTO: 4.8 K/UL (ref 4.3–11.1)

## 2019-12-09 PROCEDURE — 80053 COMPREHEN METABOLIC PANEL: CPT

## 2019-12-09 PROCEDURE — 85025 COMPLETE CBC W/AUTO DIFF WBC: CPT

## 2019-12-09 PROCEDURE — 36415 COLL VENOUS BLD VENIPUNCTURE: CPT

## 2019-12-09 NOTE — PROGRESS NOTES
12/9/2019  Pt seen with Dr Keya Shea. Had last chemo treatment 10/21/19. Will be starting Tamoxifen and Zoladex. She will start Tamoxifen in about a week and Zoladex on 1/6. We will schedule bone density test and echo. Will return to clinic in Jan.  Will continue to follow.

## 2019-12-10 PROBLEM — Z91.89 AT RISK FOR OSTEOPOROSIS: Status: ACTIVE | Noted: 2019-12-10

## 2019-12-10 PROBLEM — Z79.810 LONG-TERM CURRENT USE OF TAMOXIFEN: Status: ACTIVE | Noted: 2019-12-10

## 2019-12-13 ENCOUNTER — HOSPITAL ENCOUNTER (OUTPATIENT)
Dept: MAMMOGRAPHY | Age: 29
Discharge: HOME | End: 2019-12-13
Attending: INTERNAL MEDICINE
Payer: COMMERCIAL

## 2019-12-13 DIAGNOSIS — Z91.89 AT RISK FOR OSTEOPOROSIS: ICD-10-CM

## 2019-12-13 PROCEDURE — 77080 DXA BONE DENSITY AXIAL: CPT

## 2020-01-06 ENCOUNTER — HOSPITAL ENCOUNTER (OUTPATIENT)
Dept: INFUSION THERAPY | Age: 30
Discharge: HOME | End: 2020-01-06
Payer: COMMERCIAL

## 2020-01-06 ENCOUNTER — HOSPITAL ENCOUNTER (OUTPATIENT)
Dept: LAB | Age: 30
Discharge: HOME | End: 2020-01-06
Payer: COMMERCIAL

## 2020-01-06 DIAGNOSIS — C50.411 MALIGNANT NEOPLASM OF UPPER-OUTER QUADRANT OF RIGHT BREAST IN FEMALE, ESTROGEN RECEPTOR POSITIVE (HCC): Primary | ICD-10-CM

## 2020-01-06 DIAGNOSIS — C50.411 MALIGNANT NEOPLASM OF UPPER-OUTER QUADRANT OF RIGHT BREAST IN FEMALE, ESTROGEN RECEPTOR POSITIVE (HCC): ICD-10-CM

## 2020-01-06 DIAGNOSIS — Z17.0 MALIGNANT NEOPLASM OF UPPER-OUTER QUADRANT OF RIGHT BREAST IN FEMALE, ESTROGEN RECEPTOR POSITIVE (HCC): ICD-10-CM

## 2020-01-06 DIAGNOSIS — Z17.0 MALIGNANT NEOPLASM OF UPPER-OUTER QUADRANT OF RIGHT BREAST IN FEMALE, ESTROGEN RECEPTOR POSITIVE (HCC): Primary | ICD-10-CM

## 2020-01-06 LAB
ALBUMIN SERPL-MCNC: 4.3 G/DL (ref 3.5–5)
ALBUMIN/GLOB SERPL: 1.2 {RATIO} (ref 1.2–3.5)
ALP SERPL-CCNC: 68 U/L (ref 50–136)
ALT SERPL-CCNC: 19 U/L (ref 12–65)
ANION GAP SERPL CALC-SCNC: 5 MMOL/L (ref 7–16)
AST SERPL-CCNC: 8 U/L (ref 15–37)
BASOPHILS # BLD: 0 K/UL (ref 0–0.2)
BASOPHILS NFR BLD: 1 % (ref 0–2)
BILIRUB SERPL-MCNC: 0.3 MG/DL (ref 0.2–1.1)
BUN SERPL-MCNC: 13 MG/DL (ref 6–23)
CALCIUM SERPL-MCNC: 9.2 MG/DL (ref 8.3–10.4)
CHLORIDE SERPL-SCNC: 106 MMOL/L (ref 98–107)
CO2 SERPL-SCNC: 27 MMOL/L (ref 21–32)
CREAT SERPL-MCNC: 0.85 MG/DL (ref 0.6–1)
DIFFERENTIAL METHOD BLD: NORMAL
EOSINOPHIL # BLD: 0.3 K/UL (ref 0–0.8)
EOSINOPHIL NFR BLD: 6 % (ref 0.5–7.8)
ERYTHROCYTE [DISTWIDTH] IN BLOOD BY AUTOMATED COUNT: 11.9 % (ref 11.9–14.6)
GLOBULIN SER CALC-MCNC: 3.7 G/DL (ref 2.3–3.5)
GLUCOSE SERPL-MCNC: 86 MG/DL (ref 65–100)
HCT VFR BLD AUTO: 40.1 % (ref 35.8–46.3)
HGB BLD-MCNC: 13.4 G/DL (ref 11.7–15.4)
IMM GRANULOCYTES # BLD AUTO: 0 K/UL (ref 0–0.5)
IMM GRANULOCYTES NFR BLD AUTO: 0 % (ref 0–5)
LYMPHOCYTES # BLD: 1.3 K/UL (ref 0.5–4.6)
LYMPHOCYTES NFR BLD: 26 % (ref 13–44)
MCH RBC QN AUTO: 30.9 PG (ref 26.1–32.9)
MCHC RBC AUTO-ENTMCNC: 33.4 G/DL (ref 31.4–35)
MCV RBC AUTO: 92.6 FL (ref 79.6–97.8)
MONOCYTES # BLD: 0.4 K/UL (ref 0.1–1.3)
MONOCYTES NFR BLD: 9 % (ref 4–12)
NEUTS SEG # BLD: 2.9 K/UL (ref 1.7–8.2)
NEUTS SEG NFR BLD: 59 % (ref 43–78)
NRBC # BLD: 0 K/UL (ref 0–0.2)
PLATELET # BLD AUTO: 237 K/UL (ref 150–450)
PMV BLD AUTO: 10.6 FL (ref 9.4–12.3)
POTASSIUM SERPL-SCNC: 4.5 MMOL/L (ref 3.5–5.1)
PROT SERPL-MCNC: 8 G/DL (ref 6.3–8.2)
RBC # BLD AUTO: 4.33 M/UL (ref 4.05–5.25)
SODIUM SERPL-SCNC: 138 MMOL/L (ref 136–145)
WBC # BLD AUTO: 5 K/UL (ref 4.3–11.1)

## 2020-01-06 PROCEDURE — 85025 COMPLETE CBC W/AUTO DIFF WBC: CPT

## 2020-01-06 PROCEDURE — 90686 IIV4 VACC NO PRSV 0.5 ML IM: CPT | Performed by: INTERNAL MEDICINE

## 2020-01-06 PROCEDURE — 96402 CHEMO HORMON ANTINEOPL SQ/IM: CPT

## 2020-01-06 PROCEDURE — 74011250636 HC RX REV CODE- 250/636: Performed by: INTERNAL MEDICINE

## 2020-01-06 PROCEDURE — 36415 COLL VENOUS BLD VENIPUNCTURE: CPT

## 2020-01-06 PROCEDURE — 80053 COMPREHEN METABOLIC PANEL: CPT

## 2020-01-06 PROCEDURE — 90471 IMMUNIZATION ADMIN: CPT

## 2020-01-06 RX ADMIN — INFLUENZA VIRUS VACCINE 0.5 ML: 15; 15; 15; 15 SUSPENSION INTRAMUSCULAR at 16:23

## 2020-01-06 RX ADMIN — GOSERELIN ACETATE 3.6 MG: 3.6 IMPLANT SUBCUTANEOUS at 16:18

## 2020-01-06 NOTE — PROGRESS NOTES
Arrived to the [facility]. Zoladex & Flu injections completed. Patient tolerated well. Observed x 30 minutes, no reaction. Any issues or concerns during appointment: None. Patient aware of next infusion appointment on 2/3 (date) at 1500 (time). Discharged ambulatory in stable condition.

## 2020-02-03 ENCOUNTER — HOSPITAL ENCOUNTER (OUTPATIENT)
Dept: INFUSION THERAPY | Age: 30
Discharge: HOME | End: 2020-02-03
Payer: COMMERCIAL

## 2020-02-03 VITALS
HEART RATE: 77 BPM | SYSTOLIC BLOOD PRESSURE: 119 MMHG | TEMPERATURE: 98.1 F | DIASTOLIC BLOOD PRESSURE: 63 MMHG | RESPIRATION RATE: 16 BRPM | OXYGEN SATURATION: 98 %

## 2020-02-03 DIAGNOSIS — Z17.0 MALIGNANT NEOPLASM OF UPPER-OUTER QUADRANT OF RIGHT BREAST IN FEMALE, ESTROGEN RECEPTOR POSITIVE (HCC): Primary | ICD-10-CM

## 2020-02-03 DIAGNOSIS — C50.411 MALIGNANT NEOPLASM OF UPPER-OUTER QUADRANT OF RIGHT BREAST IN FEMALE, ESTROGEN RECEPTOR POSITIVE (HCC): Primary | ICD-10-CM

## 2020-02-03 PROCEDURE — 74011250636 HC RX REV CODE- 250/636: Performed by: INTERNAL MEDICINE

## 2020-02-03 PROCEDURE — 96402 CHEMO HORMON ANTINEOPL SQ/IM: CPT

## 2020-02-03 RX ADMIN — GOSERELIN ACETATE 3.6 MG: 3.6 IMPLANT SUBCUTANEOUS at 15:15

## 2020-02-06 ENCOUNTER — HOSPITAL ENCOUNTER (OUTPATIENT)
Dept: RADIATION ONCOLOGY | Age: 30
Discharge: HOME | End: 2020-02-06
Payer: COMMERCIAL

## 2020-02-06 VITALS
BODY MASS INDEX: 23.8 KG/M2 | WEIGHT: 143 LBS | TEMPERATURE: 98.4 F | OXYGEN SATURATION: 97 % | DIASTOLIC BLOOD PRESSURE: 72 MMHG | RESPIRATION RATE: 20 BRPM | SYSTOLIC BLOOD PRESSURE: 117 MMHG | HEART RATE: 75 BPM

## 2020-02-06 PROCEDURE — 99211 OFF/OP EST MAY X REQ PHY/QHP: CPT

## 2020-02-06 RX ORDER — MULTIVITAMIN
1 TABLET ORAL DAILY
COMMUNITY

## 2020-02-06 NOTE — PROGRESS NOTES
Patient: Trae Lombardi MRN: [id number]  SSN: [id number]    YOB: 1990  Age: 30 y.o. Sex: female      Other Providers:  Laury RAYMUNDO, Wily Dowell MD ([address] at [facility] in [place])    Nicola General: Breast cancer    DIAGNOSIS: Right breast invasive ductal carcinoma, Stage IIB ([id number]), ER/KS/ HER2 positive    PREVIOUS TREATMENT:  1)  Right partial mastectomy and sentinel lymph node biopsy on 9/24/18  2) C1 adjuvant TCHP 10/22/18, planned to complete 6th cycle 2/11/19  3) Planned completion mastectomy with expander placement  4) Radiation right chest wall. Dose: 5000 cGy in 25 fractions right Sclav, 5000 cGy in 25 fractions right chest wall, 1000 cGy in 5 fractions right chest wall boost. TREATMENT DATES:  5/22/2019 - 7/5/2019      HISTORY OF PRESENT ILLNESS:  Trae Lombardi is a 30 y.o. female seen initially by Dr. Natty Caruso at the request of Dr. Wilman Jasso. She initially presented in August 2018 with a palpable mass in her right lateral breast.  Upon further questioning, it had been present for approximately 8 years. There were no associated nipple discharge or skin changes. Right breast US on 8/27/18 identified a 3.5 cm mass with no suspicious axillary lymph nodes. US guided core biopsy 9/11/18 revealed IDC, grade 3, measuring 13 mm in maximal linear dimension. She saw genetics 9/17/18 and underwent egg collection and preservation. She subsequently underwent right partial mastectomy and sentinel lymph node biopsy on 9/24/18 which revealed stage IIb ([id number]), ER/KS/ AR+, HER-2 FISH positive, IDC at 9 o'clock. Surgical margins were uninvolved by invasive carcinoma but DCIS was present at the lateral margin focally, 3 mm. A focus of DCIS was also found 1 mm from deep margin and 2 mm from inferior margin. A13 mm lymph node was also found in 1/9 lymph nodes.       Following surgery, she was seen by oncology, Dr. Wily Dowell, Oncologist at [facility] in [place], who recommended adjuvant chemotherapy. She started C1 adjuvant TCHP 10/22/18. She developed neutropenic fever requiring hospitalization from 12/9/18 - 12/12/18 after C3. No source was found, but viral URI was presumed. C4 was delayed one week due to 70K platelet count. C4 was administered on 12/31/18 with carbo dose reduced to 700 mg (AUC 4.85). The plan outlined by Dr. Brett Cano included 6 cycles of TCHP, with continuation of dual Ab therapy every 3 weeks for a total of one year if tolerated, as well as, starting endocrine therapy (tamoxifen vs ovarian suppression). She was last seen by Dr. Deon Jasso on 1/21/19, and started cycle 5 of treatment the same day with plans to complete cycle six on 2/11/19 in [place]. However, she recently relocated to the [facility] and was seeking to establish local oncology care. She therefore met with Dr. Monika Griffin 2/4/19 and also wished to have radiation delivered locally. As result, she was referred to our office for discussion and radiation planning. With the findings on her partial mastectomy, she does have a planned mastectomy with expanders to be placed early March. TREATMENT COURSE:  Renetta Grubbs did well without complaints week 1.  Week 2 with skin irritation and redness.  More discomfort in the axilla week 3. Bright erythema in the skin week 4.  Developed desquamation in the axilla although not moist along with bright erythema in the inframmary fold. Instructed to take advil for pain and hydrocortisone for itch. INTERVAL HISTORY:    8/8/19: Returns 1 month out from completion of radiation to right chest wall. She tolerated radiation well. She reports skin issues have resolved. At this time, she reports no breast issues. Energy recovering. Plans to complete x1 year Herceptin/Perjeta in October. Overall she is feeling well with no complaints. 02/06/20: Returns 7 months out from completing radiation therapy to right chest wall.  She underwent reconstruction surgery [facility] 11/20/2019. Has limited ROM with RUE, otherwise no complaints. Reports having \"thightness/pulling\". Energy level at baseline. Compliant with tamoxifen managed by medical oncology. OBSTETRIC HISTORY:    Menarche at the age of 16. G0,P0. Oral Contraceptive Pills:  None  Hormone Replacement Therapy:  None    PAST MEDICAL HISTORY:    Past Medical History:   Diagnosis Date    Anxiety     Asthma     Cancer ([address] 09/2018    stage 2 triple positive ductal breast cancer     The patient denies history of collagen vascular diseases, pacemaker insertion, prior radiation or prior chemotherapy predating her breast cancer diagnosis. PAST SURGICAL HISTORY:   Past Surgical History:   Procedure Laterality Date    BREAST SURGERY PROCEDURE UNLISTED      HX BREAST LUMPECTOMY  09/2018    HX ORTHOPAEDIC Right 06/2009    knee surgery    HX ORTHOPAEDIC Left 06/2010    knee surgery    IR REMOVE TUNL CVAD W PORT/PUMP  10/29/2019    MA BIOPSY OF BREAST, NEEDLE CORE Right 09/2018     MEDICATIONS:     Current Outpatient Medications:     LORazepam (ATIVAN) 1 mg tablet, Take 1 Tab by mouth every eight (8) hours as needed for Anxiety. Max Daily Amount: 3 mg., Disp: 30 Tab, Rfl: 2    tamoxifen (NOLVADEX) 20 mg tablet, Take 1 Tab by mouth daily. , Disp: 30 Tab, Rfl: 3    venlafaxine-SR (EFFEXOR-XR) 37.5 mg capsule, Take 2 Caps by mouth daily. , Disp: 90 Cap, Rfl: 3    venlafaxine-SR (EFFEXOR XR) 37.5 mg capsule, Take  by mouth daily. , Disp: , Rfl:     zolpidem (AMBIEN) 5 mg tablet, Take 5 mg by mouth nightly as needed for Sleep., Disp: , Rfl:     albuterol (PROVENTIL HFA, VENTOLIN HFA, PROAIR HFA) 90 mcg/actuation inhaler, Take  by inhalation as needed for Wheezing., Disp: , Rfl:     ALLERGIES:   No Known Allergies    SOCIAL HISTORY:   Social History     Socioeconomic History    Marital status: SINGLE     Spouse name: Not on file    Number of children: Not on file    Years of education: Not on file    Highest education level: Not on file   Occupational History    Not on file   Social Needs    Financial resource strain: Not on file    Food insecurity:     Worry: Not on file     Inability: Not on file    Transportation needs:     Medical: Not on file     Non-medical: Not on file   Tobacco Use    Smoking status: Never Smoker    Smokeless tobacco: Never Used   Substance and Sexual Activity    Alcohol use: Yes     Alcohol/week: 4.0 standard drinks     Types: 2 Cans of beer, 2 Glasses of wine per week    Drug use: No    Sexual activity: Yes     Partners: Male     Birth control/protection: Condom   Lifestyle    Physical activity:     Days per week: Not on file     Minutes per session: Not on file    Stress: Not on file   Relationships    Social connections:     Talks on phone: Not on file     Gets together: Not on file     Attends Zoroastrianism service: Not on file     Active member of club or organization: Not on file     Attends meetings of clubs or organizations: Not on file     Relationship status: Not on file    Intimate partner violence:     Fear of current or ex partner: Not on file     Emotionally abused: Not on file     Physically abused: Not on file     Forced sexual activity: Not on file   Other Topics Concern    Not on file   Social History Narrative    Not on file     FAMILY HISTORY:   Family History   Problem Relation Age of Onset    Cancer Mother         skin cancer    Cancer Father         skin cancer    Cancer Paternal Aunt         breast cancer     REVIEW OF SYSTEMS: Please see interval history    PHYSICAL EXAMINATION:   ECOG Performance status 0  VITAL SIGNS: Blood pressure 117/72  Pulse 75  Temperature 98.4  Weight 143    GENERAL: The patient is well-developed, ambulatory, alert and in no acute distress. LYMPHATIC: There is no cervical, supraclavicular or axillary lymphadenopathy bilaterally. MUSCULOSKELETAL: Extremities reveal no cyanosis, clubbing or edema.  is 5+/5. BREASTS: Examination of the unaffected breast reveals no dominant nodules or masses. Reconstruction appears well healed. PATHOLOGY:      SURGICAL PATHOOGY 9/11/2018         SURGICAL PATHOLOGY 9/24/2018            LABORATORY:   Lab Results   Component Value Date/Time    Sodium 138 01/06/2020 02:44 PM    Potassium 4.5 01/06/2020 02:44 PM    Chloride 106 01/06/2020 02:44 PM    CO2 27 01/06/2020 02:44 PM    Anion gap 5 (L) 01/06/2020 02:44 PM    Glucose 86 01/06/2020 02:44 PM    BUN 13 01/06/2020 02:44 PM    Creatinine 0.85 01/06/2020 02:44 PM    GFR est AA >60 01/06/2020 02:44 PM    GFR est non-AA >60 01/06/2020 02:44 PM    Calcium 9.2 01/06/2020 02:44 PM    Magnesium 2.2 06/17/2019 08:00 AM    Albumin 4.3 01/06/2020 02:44 PM    Protein, total 8.0 01/06/2020 02:44 PM    Globulin 3.7 (H) 01/06/2020 02:44 PM    A-G Ratio 1.2 01/06/2020 02:44 PM    AST (SGOT) 8 (L) 01/06/2020 02:44 PM    ALT (SGPT) 19 01/06/2020 02:44 PM     Lab Results   Component Value Date/Time    WBC 5.0 01/06/2020 02:44 PM    HGB 13.4 01/06/2020 02:44 PM    HCT 40.1 01/06/2020 02:44 PM    PLATELET 035 55/41/5091 02:44 PM       RADIOLOGY:    RIGHT BREAST/AXILLA ULTRASOUND 8/27/18  FINDINGS:       MRI BREAST B/L W CONTRAST 9/27/2018  FINDINGS:        IMPRESSION:  Rodrick Vides is a 30 y.o. female with Stage IIB breast cancer s/p bilateral mastectomy with placement of expanders. Completed chemotherapy (TCHP) in [place]. She relocated to [place] which is where she completed her treatment (herceptin/pergeta) with Dr. Mariel Hernandez in October 2019. She completed adjuvant radiation or the right chest wall 7/5/2019. She is now on endocrine therapy managed by medical oncology. She underwent reconstruction surgery (left breast expander),  [facility] 11/20/2019. Plans to have final reconstruction in the spring. Ms Francisco Vieira is now 7 months out from completing adjuvant radiation. She is recovering as anticipated from treatment.  Discussed potential late side effects related to previous radiation therapy. Continue with light massage/lotion. Has some limited ROM of the right upper extremity. Refer to onc rehab for evaluation and treat. Endocrine therapy managed by medical oncology. We discussed ongoing surveillance for her breast cancer s/p radiation therapy to include follow up in 6 months. At that time if she is doing well, she will need no further follow up with radiation oncology . All questions were answered to the best of my ability. Chandra Hillman NP  02/06/20     Portions of this note were copied from prior encounters and reviewed for accuracy, currency, and represent documentation and tasks completed during this encounter. I verify and attest these portions to be unchanged from prior visits.

## 2020-02-06 NOTE — PROGRESS NOTES
6 Month Follow Up    03/30/2020 - Med Onc Kelikacie Ibrahim), Zoladex    RT End: 0706/2019  Chemo End: 02/11/2019  -Started Chemo in [place], relocated  09/18/2018 - Mammogram  09/19/2019 - Herceptin / Doc Moon  Right lumpectomy completion with expander    Bone Density (12/13/2019): low  -taking Calcium and Vitamin D        Cherylin Cons, CMA

## 2020-02-06 NOTE — PROGRESS NOTES
A referral was faxed to [facility] to help pt. improve her R.O.M., pt has bilateral breast expanders in place. She will return in 6 months for FUP.

## 2020-02-17 ENCOUNTER — HOSPITAL ENCOUNTER (OUTPATIENT)
Dept: PHYSICAL THERAPY | Age: 30
Discharge: HOME | End: 2020-02-17
Payer: COMMERCIAL

## 2020-02-17 PROCEDURE — 97140 MANUAL THERAPY 1/> REGIONS: CPT

## 2020-02-17 PROCEDURE — 97161 PT EVAL LOW COMPLEX 20 MIN: CPT

## 2020-02-17 PROCEDURE — 97110 THERAPEUTIC EXERCISES: CPT

## 2020-02-17 NOTE — PROGRESS NOTES
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 TREATMENT PLAN:  Effective Dates: 2020 TO 2020 (90 days). Frequency/Duration: 1 time a week for 90 Day(s)       Pre-treatment Symptoms/Complaints:  I am having problems with ROM. Pain: Initial:   0/10 Post Session:  0/10   Medications Last Reviewed:  2020  Updated Objective Findings:  See evaluation note from today  TREATMENT:     THERAPEUTIC EXERCISE: (15 minutes):  Exercises per grid below to improve mobility. Required minimal verbal cues to promote proper body alignment. Progressed range as indicated. MANUAL THERAPY: (10 minutes): Complex decongestive physiotherapy was utilized and necessary because of the patient's risk for edema. instructed in below exercises. Educated in lymphedema, risk reduction and need for compression with flights and exercises. To gradually work up to 10 reps with exercises. Stretch to tolerance. Requested a prescription for a compression garment. [employer] PortalAccess Code: [email or web address]   URL: [email or web address]   Date: 2020   Prepared by: Mio Cm     Exercises   Supine Lower Trunk Rotation - 10 reps - 1 sets - 5 hold - 3x daily - 7x weekly   Sidelying Thoracic Rotation with Open Book - 10 reps - 1 sets - 5 hold - 3x daily - 7x weekly   Supine Shoulder Flexion with Dowel - 10 reps - 1 sets - 5 hold - 3x daily - 7x weekly   Supine Shoulder External Rotation with Dowel - 10 reps - 1 sets - 5 hold - 3x daily - 7x weekly       Treatment/Session Summary:    · Response to Treatment:  tolerated the treatment well.  will benefit from compression with her current exercise program..  · Communication/Consultation:  acquire a compression sleeve and gauntlet. HEP  · Equipment provided today:  None today  · Recommendations/Intent for next treatment session: Next visit will focus on assess ROM, assess fit of garment.     Total Treatment Billable Duration:  25 minutes  PT Patient Time In/Time Out  Time In: 0840  Time Out: 6811  Niki Degroot, PT    Future Appointments   Date Time Provider Wily Hernández   2/24/2020 11:00 AM [facility]   3/2/2020  3:00 PM NUS10 GCCOPIG [facility]   3/30/2020  1:10 PM [facility] OUTREACH INSURANCE GCCOIG [facility]   3/30/2020  1:30 PM Charla Sandoval MD [facility]   3/30/2020  2:30 PM Anila Butler [facility]   8/13/2020  9:30 AM True Purdy NP GCCR[facility]

## 2020-02-17 NOTE — THERAPY EVALUATION
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 TREATMENT PLAN:  Effective Dates: 2020 TO 2020 (90 days). Frequency/Duration: 1 time a week for 90 Day(s) MEDICAL/REFERRING DIAGNOSIS:  Breast cancer, right ([facility] [address] [C50.911]    DATE OF ONSET: 18  REFERRING PHYSICIAN: Sweta Rivera NP MD Orders: oncology rehab  Return MD Appointment: 20     INITIAL ASSESSMENT:  Ms. Ti Bates presents following diagnosis and treatment of tight breast cancer. She underwent a biopsy 18. She received a partial mastectomy with SNB 18.   nodes involved. On 10/22/18 she started [facility]  She received radiation 19 to 19. She has undergone completion bilateral mastectomy with expander placement and right lat flap. She has limited ROM and is at risk for lymphedema. She will benefit from therapeutic exercises, lymphedema education and compression for flights and exercise. PROBLEM LIST (Impacting functional limitations):  1. Decreased Flexibility/Joint Mobility  2. Edema/Girth  3. Decreased Knowledge of Precautions  4. Decreased [place] with Home Exercise Program INTERVENTIONS PLANNED: (Treatment may consist of any combination of the following)  1. Decongestion Therapy  2. Home Exercise Program (HEP)  3. Manual Therapy  4. Range of Motion (ROM)     GOALS: (Goals have been discussed and agreed upon with patient.)  Short-Term Functional Goals: Time Frame: 4 weeks  1.  The patient will be independent with HEP for ROM within 4 weeks. 2. The patient will have knowledge of signs and symptoms of lymphedema and how to manage within 4 weeks. 3. The patient will be fit with a compression garment within 4 weeks. 4. The patient will gain 10 degrees flexion, abduction and external rotation of the right shoulder within 4 weeks. Discharge Goals: Time Frame: 8 weeks  1. The patient will gain 20 degrees flexion, abduction and external rotation of the right shoulder within 8 weeks    OUTCOME MEASURE:   Tool Used: Disabilities of the Arm, Shoulder and Hand (DASH) Questionnaire - Quick Version  Assess next visit  Score:  Initial:  /55  Most Recent: X/55 (Date: -- )   Interpretation of Score: The DASH is designed to measure the activities of daily living in person's with upper extremity dysfunction or pain. Each section is scored on a 1-5 scale, 5 representing the greatest disability. The scores of each section are added together for a total score of 55. Tool Used: ECOG Performance Survey Score  Score:  Initial: 0 Most Recent:      Interpretation of Score:   0 Fully active, able to carry on all pre-disease performance without restriction   1 Restricted in physically strenuous activity but ambulatory and able to carry out work of a light or sedentary nature, e.g., light house work, office work   2 Ambulatory and capable of all selfcare but unable to carry out any work activities. Up and about more than 50% of waking hours   3 Capable of only limited selfcare, confined to bed or chair more than 50% of waking hours   4 Completely disabled. Cannot carry on any selfcare. Totally confined to bed or chair   5 Dead        Tool Used: Lymphedema Life Impact Scale   Score:  Initial:   Most Recent: X (Date: -- )   Interpretation of Score: The Lymphedema Life Impact Scale (LLIS) is a validated instrument that measures the physical, functional, and psychosocial concerns pertinent to patients with extremity lymphedema.   The Scale's questionnaire is administered to patients to gauge impairments, activity limitations, and participation restrictions resulting from their lymphedema. MEDICAL NECESSITY:   · Patient is expected to demonstrate progress in range of motion and edema management to reduce risk of cellulitis. REASON FOR SERVICES/OTHER COMMENTS:  · Patient continues to demonstrate capacity to improve ROM and edema management which will increase independence. Total Duration:  PT Patient Time In/Time Out  Time In: 0840  Time Out: 6820    Rehabilitation Potential For Stated Goals: Good  Regarding Dorothy Natarajan's therapy, I certify that the treatment plan above will be carried out by a therapist or under their direction. Thank you for this referral,  Nneka Alarcon, PT          PAIN/SUBJECTIVE:   Initial:   0 Post Session:  0/10   HISTORY:   History of Injury/Illness (Reason for Referral):  Right breast cancer, chemo, radiation, partial mastectomy, completion bilateral mastectomy, expander placement, lat flap right, decreased ROM, risk for lymphedema  Past Medical History/Comorbidities:   Ms. Lion Grigsby  has a past medical history of Anxiety, Asthma, and Cancer ([facility] [address] (09/2018). Ms. Lion Grigsby  has a past surgical history that includes pr breast surgery procedure unlisted; hx breast lumpectomy (09/2018); pr biopsy of breast, needle core (Right, 09/2018); hx orthopaedic (Right, 06/2009); hx orthopaedic (Left, 06/2010); and ir remove tunl cvad w port/pump (10/29/2019).   Past Medical History:   Diagnosis Date    Anxiety     Asthma     Cancer ([facility] [address] 09/2018    stage 2 triple positive ductal breast cancer     Past Surgical History:   Procedure Laterality Date    BREAST SURGERY PROCEDURE UNLISTED      HX BREAST LUMPECTOMY  09/2018    HX ORTHOPAEDIC Right 06/2009    knee surgery    HX ORTHOPAEDIC Left 06/2010    knee surgery    IR REMOVE TUNL CVAD W PORT/PUMP  10/29/2019    [place] BIOPSY OF BREAST, NEEDLE CORE Right 09/2018       Social History/Living Environment:     lives with family  Prior Level of Function/Work/Activity:  Independent, student  Dominant Side:         RIGHT   Ambulatory/Rehab Services H2 Model Falls Risk Assessment   Risk Factors:       No Risk Factors Identified Ability to Rise from Chair:       (0)  Ability to rise in a single movement   Falls Prevention Plan:       No modifications necessary   Total: (5 or greater = High Risk): 0   ©2010 [facility] of [employer]. All Rights Reserved. [facility] [employer] Patent #[id number]. Federal Law prohibits the replication, distribution or use without written permission from [facility] [employer]   Current Medications:       Current Outpatient Medications:     calcium-cholecalciferol, D3, (CALTRATE 600+D) tablet, Take 1 Tab by mouth daily. Indications: prevention of vitamin D deficiency, Disp: , Rfl:     LORazepam (ATIVAN) 1 mg tablet, Take 1 Tab by mouth every eight (8) hours as needed for Anxiety. Max Daily Amount: 3 mg., Disp: 30 Tab, Rfl: 2    tamoxifen (NOLVADEX) 20 mg tablet, Take 1 Tab by mouth daily. , Disp: 30 Tab, Rfl: 3    venlafaxine-SR (EFFEXOR-XR) 37.5 mg capsule, Take 2 Caps by mouth daily. , Disp: 90 Cap, Rfl: 3    venlafaxine-SR (EFFEXOR XR) 37.5 mg capsule, Take  by mouth daily. , Disp: , Rfl:     zolpidem (AMBIEN) 5 mg tablet, Take 5 mg by mouth nightly as needed for Sleep., Disp: , Rfl:     albuterol (PROVENTIL HFA, VENTOLIN HFA, PROAIR HFA) 90 mcg/actuation inhaler, Take  by inhalation as needed for Wheezing., Disp: , Rfl:    Date Last Reviewed:  2/17/20   Number of Personal Factors/Comorbidities that affect the Plan of Care: 1-2: MODERATE COMPLEXITY   EXAMINATION:   ROM:          Right flexion 145, abduction 90, external rotation 50  Left flexion 170, abduction 130, external rotation 90  Strength:          Grossly 4+/5  Functional Mobility:         Gait/Ambulation:  independent        Transfers:  independent        Bed Mobility:  independent  Skin Integrity: Intact, healed incisions  Edema/Girth:  none    Left Right    Initial Most Recent Initial Most Recent   Upper  Extremity Base 3rd Finger (cm): 16.5  Wrist (cm): 15  Mid Forearm (cm): 17.9  Elbow (cm): 24  Axilla (cm): 26(30, 31)   Base 3rd Finger (cm): 17  Wrist (cm): 14.6  Mid Forearm (cm): 18.5  Elbow (cm): 23.5  Axilla (cm): 25.3(30.2, 31)     Lower  Extremity               Body Structures Involved:  1. Joints  2. Muscles  3. lymphatic system Body Functions Affected:  1. Neuromusculoskeletal Activities and Participation Affected:  1.  None   Number of elements (examined above) that affect the Plan of Care: 4+: HIGH COMPLEXITY   CLINICAL PRESENTATION:   Presentation: Stable and uncomplicated: LOW COMPLEXITY   CLINICAL DECISION MAKING:   Use of outcome tool(s) and clinical judgement create a POC that gives a: Clear prediction of patient's progress: LOW COMPLEXITY

## 2020-02-24 ENCOUNTER — HOSPITAL ENCOUNTER (OUTPATIENT)
Dept: PHYSICAL THERAPY | Age: 30
Discharge: HOME | End: 2020-02-24
Payer: COMMERCIAL

## 2020-02-24 PROCEDURE — 97110 THERAPEUTIC EXERCISES: CPT

## 2020-02-24 NOTE — PROGRESS NOTES
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 TREATMENT PLAN:  Effective Dates: 2020 TO 2020 (90 days). Frequency/Duration: 1 time a week for 90 Day(s)       Pre-treatment Symptoms/Complaints:  I am having surgery next Friday. My expander is leaking. I couldn't get my sleeve because they don't take my insurance. Pain: Initial:   0/10 Post Session:  0/10   Medications Last Reviewed:  2020  Updated Objective Findings:  as below   Right flexion 157, abduction 130, external rotation 57  DASH:  13  LLIS:  18  TREATMENT:     THERAPEUTIC EXERCISE: (39 minutes):  Exercises per grid below to improve mobility. Required minimal verbal cues to promote proper body alignment. Progressed range as indicated. MANUAL THERAPY: (0 minutes): Complex decongestive physiotherapy was utilized and necessary because of the patient's risk for edema. reviewed below exercises. Given information for a company that takes her insurance. Stretch to tolerance. Avoid activities which exacerbate tingling in the hand. Try to acquire a compression garment prior to flight for surgery. See once more prior to surgery. [employer] PortalAccess Code: [email or web address]   URL: [email or web address]   Date: 2020   Prepared by: Lazarus Galt     Exercises   Supine Lower Trunk Rotation - 10 reps - 1 sets - 5 hold - 3x daily - 7x weekly   Sidelying Thoracic Rotation with Open Book - 10 reps - 1 sets - 5 hold - 3x daily - 7x weekly   Supine Shoulder Flexion with Dowel - 10 reps - 1 sets - 5 hold - 3x daily - 7x weekly   Supine Shoulder External Rotation with Dowel - 10 reps - 1 sets - 5 hold - 3x daily - 7x weekly     Added sword of hope, chicken wing and corner stretch. Treatment/Session Summary:    · Response to Treatment:  tolerated the treatment well.  will benefit from compression with her current exercise program..  · Communication/Consultation:  acquire a compression sleeve and gauntlet. HEP  · Equipment provided today:  None today  · Recommendations/Intent for next treatment session: Next visit will focus on assess ROM, assess fit of garment.     Total Treatment Billable Duration:  39 minutes  PT Patient Time In/Time Out  Time In: 1102  Time Out: [address] PT    Future Appointments   Date Time Provider Wily Hernández   3/2/2020  2:30 PM Jordyn Burton, PT [facility]   3/2/2020  3:00 PM NUS10 GCCOPIG [facility]   3/30/2020  1:10 PM [address] [place] [address] OUTREACH INSURANCE GCCOIG [address]   3/30/2020  1:30 PM King Maxwell MD [facility]   3/30/2020  2:30 PM Linda Herrera [address]   8/13/2020  9:30 AM Nicola Boyer NP GCCROG [address]

## 2020-03-02 ENCOUNTER — HOSPITAL ENCOUNTER (OUTPATIENT)
Dept: PHYSICAL THERAPY | Age: 30
Discharge: HOME | End: 2020-03-02
Payer: COMMERCIAL

## 2020-03-02 ENCOUNTER — HOSPITAL ENCOUNTER (OUTPATIENT)
Dept: INFUSION THERAPY | Age: 30
Discharge: HOME | End: 2020-03-02
Payer: COMMERCIAL

## 2020-03-02 VITALS
DIASTOLIC BLOOD PRESSURE: 85 MMHG | OXYGEN SATURATION: 97 % | HEIGHT: 65 IN | SYSTOLIC BLOOD PRESSURE: 161 MMHG | HEART RATE: 88 BPM | BODY MASS INDEX: 23.06 KG/M2 | WEIGHT: 138.4 LBS | RESPIRATION RATE: 16 BRPM | TEMPERATURE: 98.5 F

## 2020-03-02 DIAGNOSIS — C50.411 MALIGNANT NEOPLASM OF UPPER-OUTER QUADRANT OF RIGHT BREAST IN FEMALE, ESTROGEN RECEPTOR POSITIVE (HCC): Primary | ICD-10-CM

## 2020-03-02 DIAGNOSIS — Z17.0 MALIGNANT NEOPLASM OF UPPER-OUTER QUADRANT OF RIGHT BREAST IN FEMALE, ESTROGEN RECEPTOR POSITIVE (HCC): Primary | ICD-10-CM

## 2020-03-02 PROCEDURE — 74011250636 HC RX REV CODE- 250/636: Performed by: INTERNAL MEDICINE

## 2020-03-02 PROCEDURE — 96402 CHEMO HORMON ANTINEOPL SQ/IM: CPT

## 2020-03-02 PROCEDURE — 97110 THERAPEUTIC EXERCISES: CPT

## 2020-03-02 RX ADMIN — GOSERELIN ACETATE 3.6 MG: 3.6 IMPLANT SUBCUTANEOUS at 15:47

## 2020-03-02 NOTE — PROGRESS NOTES
Pt. Discharged ambulatory. Tolerated injection well. No distress noted. To call physician with any problems or concerns. Understanding voiced. To return to Infusions on 3/30/20.

## 2020-03-30 ENCOUNTER — HOSPITAL ENCOUNTER (OUTPATIENT)
Dept: LAB | Age: 30
Discharge: HOME | End: 2020-03-30
Payer: COMMERCIAL

## 2020-03-30 ENCOUNTER — HOSPITAL ENCOUNTER (OUTPATIENT)
Dept: INFUSION THERAPY | Age: 30
Discharge: HOME | End: 2020-03-30
Payer: COMMERCIAL

## 2020-03-30 DIAGNOSIS — C50.411 MALIGNANT NEOPLASM OF UPPER-OUTER QUADRANT OF RIGHT BREAST IN FEMALE, ESTROGEN RECEPTOR POSITIVE (HCC): ICD-10-CM

## 2020-03-30 DIAGNOSIS — C50.411 MALIGNANT NEOPLASM OF UPPER-OUTER QUADRANT OF RIGHT BREAST IN FEMALE, ESTROGEN RECEPTOR POSITIVE (HCC): Primary | ICD-10-CM

## 2020-03-30 DIAGNOSIS — Z17.0 MALIGNANT NEOPLASM OF UPPER-OUTER QUADRANT OF RIGHT BREAST IN FEMALE, ESTROGEN RECEPTOR POSITIVE (HCC): Primary | ICD-10-CM

## 2020-03-30 DIAGNOSIS — Z17.0 MALIGNANT NEOPLASM OF UPPER-OUTER QUADRANT OF RIGHT BREAST IN FEMALE, ESTROGEN RECEPTOR POSITIVE (HCC): ICD-10-CM

## 2020-03-30 LAB
ALBUMIN SERPL-MCNC: 3.8 G/DL (ref 3.5–5)
ALBUMIN/GLOB SERPL: 1.1 {RATIO} (ref 1.2–3.5)
ALP SERPL-CCNC: 60 U/L (ref 50–136)
ALT SERPL-CCNC: 19 U/L (ref 12–65)
ANION GAP SERPL CALC-SCNC: 4 MMOL/L (ref 7–16)
AST SERPL-CCNC: 15 U/L (ref 15–37)
BASOPHILS # BLD: 0 K/UL (ref 0–0.2)
BASOPHILS NFR BLD: 1 % (ref 0–2)
BILIRUB SERPL-MCNC: 0.3 MG/DL (ref 0.2–1.1)
BUN SERPL-MCNC: 10 MG/DL (ref 6–23)
CALCIUM SERPL-MCNC: 9.6 MG/DL (ref 8.3–10.4)
CHLORIDE SERPL-SCNC: 104 MMOL/L (ref 98–107)
CO2 SERPL-SCNC: 28 MMOL/L (ref 21–32)
CREAT SERPL-MCNC: 0.71 MG/DL (ref 0.6–1)
DIFFERENTIAL METHOD BLD: ABNORMAL
EOSINOPHIL # BLD: 0 K/UL (ref 0–0.8)
EOSINOPHIL NFR BLD: 1 % (ref 0.5–7.8)
ERYTHROCYTE [DISTWIDTH] IN BLOOD BY AUTOMATED COUNT: 12.7 % (ref 11.9–14.6)
GLOBULIN SER CALC-MCNC: 3.5 G/DL (ref 2.3–3.5)
GLUCOSE SERPL-MCNC: 92 MG/DL (ref 65–100)
HCT VFR BLD AUTO: 38.1 % (ref 35.8–46.3)
HGB BLD-MCNC: 12.7 G/DL (ref 11.7–15.4)
IMM GRANULOCYTES # BLD AUTO: 0 K/UL (ref 0–0.5)
IMM GRANULOCYTES NFR BLD AUTO: 0 % (ref 0–5)
LYMPHOCYTES # BLD: 1.3 K/UL (ref 0.5–4.6)
LYMPHOCYTES NFR BLD: 36 % (ref 13–44)
MCH RBC QN AUTO: 30.2 PG (ref 26.1–32.9)
MCHC RBC AUTO-ENTMCNC: 33.3 G/DL (ref 31.4–35)
MCV RBC AUTO: 90.7 FL (ref 79.6–97.8)
MONOCYTES # BLD: 0.3 K/UL (ref 0.1–1.3)
MONOCYTES NFR BLD: 9 % (ref 4–12)
NEUTS SEG # BLD: 1.8 K/UL (ref 1.7–8.2)
NEUTS SEG NFR BLD: 52 % (ref 43–78)
NRBC # BLD: 0 K/UL (ref 0–0.2)
PLATELET # BLD AUTO: 190 K/UL (ref 150–450)
PMV BLD AUTO: 10.5 FL (ref 9.4–12.3)
POTASSIUM SERPL-SCNC: 4.3 MMOL/L (ref 3.5–5.1)
PROT SERPL-MCNC: 7.3 G/DL (ref 6.3–8.2)
RBC # BLD AUTO: 4.2 M/UL (ref 4.05–5.25)
SODIUM SERPL-SCNC: 136 MMOL/L (ref 136–145)
WBC # BLD AUTO: 3.5 K/UL (ref 4.3–11.1)

## 2020-03-30 PROCEDURE — 80053 COMPREHEN METABOLIC PANEL: CPT

## 2020-03-30 PROCEDURE — 96402 CHEMO HORMON ANTINEOPL SQ/IM: CPT

## 2020-03-30 PROCEDURE — 85025 COMPLETE CBC W/AUTO DIFF WBC: CPT

## 2020-03-30 PROCEDURE — 74011250636 HC RX REV CODE- 250/636: Performed by: INTERNAL MEDICINE

## 2020-03-30 PROCEDURE — 36415 COLL VENOUS BLD VENIPUNCTURE: CPT

## 2020-03-30 RX ADMIN — GOSERELIN ACETATE 3.6 MG: 3.6 IMPLANT SUBCUTANEOUS at 14:05

## 2020-04-27 ENCOUNTER — HOSPITAL ENCOUNTER (OUTPATIENT)
Dept: INFUSION THERAPY | Age: 30
Discharge: HOME | End: 2020-04-27
Payer: COMMERCIAL

## 2020-04-27 VITALS
SYSTOLIC BLOOD PRESSURE: 123 MMHG | RESPIRATION RATE: 16 BRPM | DIASTOLIC BLOOD PRESSURE: 71 MMHG | OXYGEN SATURATION: 100 % | HEART RATE: 77 BPM | TEMPERATURE: 97.8 F

## 2020-04-27 DIAGNOSIS — C50.411 MALIGNANT NEOPLASM OF UPPER-OUTER QUADRANT OF RIGHT BREAST IN FEMALE, ESTROGEN RECEPTOR POSITIVE (HCC): Primary | ICD-10-CM

## 2020-04-27 DIAGNOSIS — Z17.0 MALIGNANT NEOPLASM OF UPPER-OUTER QUADRANT OF RIGHT BREAST IN FEMALE, ESTROGEN RECEPTOR POSITIVE (HCC): Primary | ICD-10-CM

## 2020-04-27 PROCEDURE — 74011250636 HC RX REV CODE- 250/636: Performed by: INTERNAL MEDICINE

## 2020-04-27 PROCEDURE — 96402 CHEMO HORMON ANTINEOPL SQ/IM: CPT

## 2020-04-27 RX ADMIN — GOSERELIN ACETATE 3.6 MG: 3.6 IMPLANT SUBCUTANEOUS at 10:45

## 2020-05-25 ENCOUNTER — HOSPITAL ENCOUNTER (OUTPATIENT)
Dept: INFUSION THERAPY | Age: 30
Discharge: HOME | End: 2020-05-25
Payer: COMMERCIAL

## 2020-05-25 VITALS
OXYGEN SATURATION: 98 % | SYSTOLIC BLOOD PRESSURE: 139 MMHG | DIASTOLIC BLOOD PRESSURE: 71 MMHG | TEMPERATURE: 97.7 F | RESPIRATION RATE: 18 BRPM | HEART RATE: 67 BPM

## 2020-05-25 DIAGNOSIS — Z17.0 MALIGNANT NEOPLASM OF UPPER-OUTER QUADRANT OF RIGHT BREAST IN FEMALE, ESTROGEN RECEPTOR POSITIVE (HCC): Primary | ICD-10-CM

## 2020-05-25 DIAGNOSIS — C50.411 MALIGNANT NEOPLASM OF UPPER-OUTER QUADRANT OF RIGHT BREAST IN FEMALE, ESTROGEN RECEPTOR POSITIVE (HCC): Primary | ICD-10-CM

## 2020-05-25 PROCEDURE — 96402 CHEMO HORMON ANTINEOPL SQ/IM: CPT

## 2020-05-25 PROCEDURE — 74011250636 HC RX REV CODE- 250/636: Performed by: NURSE PRACTITIONER

## 2020-05-25 RX ADMIN — GOSERELIN ACETATE 3.6 MG: 3.6 IMPLANT SUBCUTANEOUS at 10:50

## 2020-05-25 NOTE — PROGRESS NOTES
Arrived to infusion center. Zoladex injection given, patient has had injection before and understands implications and side effects. Patient denies any issues, no complications this visit. Aware of next appt 6/22 at 1:30pm. Discharged ambulatory to home.

## 2020-06-22 ENCOUNTER — HOSPITAL ENCOUNTER (OUTPATIENT)
Dept: INFUSION THERAPY | Age: 30
Discharge: HOME | End: 2020-06-22
Payer: COMMERCIAL

## 2020-06-22 ENCOUNTER — HOSPITAL ENCOUNTER (OUTPATIENT)
Dept: LAB | Age: 30
Discharge: HOME | End: 2020-06-22
Payer: COMMERCIAL

## 2020-06-22 DIAGNOSIS — C50.411 MALIGNANT NEOPLASM OF UPPER-OUTER QUADRANT OF RIGHT BREAST IN FEMALE, ESTROGEN RECEPTOR POSITIVE (HCC): Primary | ICD-10-CM

## 2020-06-22 DIAGNOSIS — Z17.0 MALIGNANT NEOPLASM OF UPPER-OUTER QUADRANT OF RIGHT BREAST IN FEMALE, ESTROGEN RECEPTOR POSITIVE (HCC): Primary | ICD-10-CM

## 2020-06-22 DIAGNOSIS — Z17.0 MALIGNANT NEOPLASM OF UPPER-OUTER QUADRANT OF RIGHT BREAST IN FEMALE, ESTROGEN RECEPTOR POSITIVE (HCC): ICD-10-CM

## 2020-06-22 DIAGNOSIS — C50.411 MALIGNANT NEOPLASM OF UPPER-OUTER QUADRANT OF RIGHT BREAST IN FEMALE, ESTROGEN RECEPTOR POSITIVE (HCC): ICD-10-CM

## 2020-06-22 LAB
ALBUMIN SERPL-MCNC: 4.2 G/DL (ref 3.5–5)
ALBUMIN/GLOB SERPL: 1.2 {RATIO} (ref 1.2–3.5)
ALP SERPL-CCNC: 72 U/L (ref 50–136)
ALT SERPL-CCNC: 20 U/L (ref 12–65)
ANION GAP SERPL CALC-SCNC: 3 MMOL/L (ref 7–16)
AST SERPL-CCNC: 15 U/L (ref 15–37)
BASOPHILS # BLD: 0 K/UL (ref 0–0.2)
BASOPHILS NFR BLD: 0 % (ref 0–2)
BILIRUB SERPL-MCNC: 0.4 MG/DL (ref 0.2–1.1)
BUN SERPL-MCNC: 7 MG/DL (ref 6–23)
CALCIUM SERPL-MCNC: 9.5 MG/DL (ref 8.3–10.4)
CHLORIDE SERPL-SCNC: 105 MMOL/L (ref 98–107)
CO2 SERPL-SCNC: 29 MMOL/L (ref 21–32)
CREAT SERPL-MCNC: 0.7 MG/DL (ref 0.6–1)
DIFFERENTIAL METHOD BLD: ABNORMAL
EOSINOPHIL # BLD: 0 K/UL (ref 0–0.8)
EOSINOPHIL NFR BLD: 0 % (ref 0.5–7.8)
ERYTHROCYTE [DISTWIDTH] IN BLOOD BY AUTOMATED COUNT: 11.9 % (ref 11.9–14.6)
GLOBULIN SER CALC-MCNC: 3.5 G/DL (ref 2.3–3.5)
GLUCOSE SERPL-MCNC: 92 MG/DL (ref 65–100)
HCT VFR BLD AUTO: 39.4 % (ref 35.8–46.3)
HGB BLD-MCNC: 13.3 G/DL (ref 11.7–15.4)
IMM GRANULOCYTES # BLD AUTO: 0 K/UL (ref 0–0.5)
IMM GRANULOCYTES NFR BLD AUTO: 0 % (ref 0–5)
LYMPHOCYTES # BLD: 1 K/UL (ref 0.5–4.6)
LYMPHOCYTES NFR BLD: 29 % (ref 13–44)
MCH RBC QN AUTO: 30.4 PG (ref 26.1–32.9)
MCHC RBC AUTO-ENTMCNC: 33.8 G/DL (ref 31.4–35)
MCV RBC AUTO: 90.2 FL (ref 79.6–97.8)
MONOCYTES # BLD: 0.3 K/UL (ref 0.1–1.3)
MONOCYTES NFR BLD: 8 % (ref 4–12)
NEUTS SEG # BLD: 2.2 K/UL (ref 1.7–8.2)
NEUTS SEG NFR BLD: 63 % (ref 43–78)
NRBC # BLD: 0 K/UL (ref 0–0.2)
PLATELET # BLD AUTO: 182 K/UL (ref 150–450)
PMV BLD AUTO: 10 FL (ref 9.4–12.3)
POTASSIUM SERPL-SCNC: 4.3 MMOL/L (ref 3.5–5.1)
PROT SERPL-MCNC: 7.7 G/DL (ref 6.3–8.2)
RBC # BLD AUTO: 4.37 M/UL (ref 4.05–5.25)
SODIUM SERPL-SCNC: 137 MMOL/L (ref 136–145)
WBC # BLD AUTO: 3.4 K/UL (ref 4.3–11.1)

## 2020-06-22 PROCEDURE — 96402 CHEMO HORMON ANTINEOPL SQ/IM: CPT

## 2020-06-22 PROCEDURE — 74011250636 HC RX REV CODE- 250/636: Performed by: NURSE PRACTITIONER

## 2020-06-22 PROCEDURE — 80053 COMPREHEN METABOLIC PANEL: CPT

## 2020-06-22 PROCEDURE — 36415 COLL VENOUS BLD VENIPUNCTURE: CPT

## 2020-06-22 PROCEDURE — 85025 COMPLETE CBC W/AUTO DIFF WBC: CPT

## 2020-06-22 RX ADMIN — GOSERELIN ACETATE 3.6 MG: 3.6 IMPLANT SUBCUTANEOUS at 13:20

## 2020-06-22 NOTE — PROGRESS NOTES
Arrived to the [facility]. Zoladex completed.    Provided education on Zoladex-patient has received this medication previously  Patient instructed to report any side affects to ordering provider-  Patient tolerated well  Any issues or concerns during appointment: No  Patient aware of next infusion appointment on [facility] 20th @ 1338  Discharged home ambulatory

## 2020-07-20 ENCOUNTER — HOSPITAL ENCOUNTER (OUTPATIENT)
Dept: INFUSION THERAPY | Age: 30
Discharge: HOME | End: 2020-07-20
Payer: COMMERCIAL

## 2020-07-20 VITALS
HEART RATE: 80 BPM | TEMPERATURE: 98.9 F | SYSTOLIC BLOOD PRESSURE: 127 MMHG | RESPIRATION RATE: 18 BRPM | DIASTOLIC BLOOD PRESSURE: 81 MMHG | OXYGEN SATURATION: 98 %

## 2020-07-20 DIAGNOSIS — Z17.0 MALIGNANT NEOPLASM OF UPPER-OUTER QUADRANT OF RIGHT BREAST IN FEMALE, ESTROGEN RECEPTOR POSITIVE (HCC): Primary | ICD-10-CM

## 2020-07-20 DIAGNOSIS — C50.411 MALIGNANT NEOPLASM OF UPPER-OUTER QUADRANT OF RIGHT BREAST IN FEMALE, ESTROGEN RECEPTOR POSITIVE (HCC): Primary | ICD-10-CM

## 2020-07-20 PROCEDURE — 74011250636 HC RX REV CODE- 250/636: Performed by: NURSE PRACTITIONER

## 2020-07-20 PROCEDURE — 96402 CHEMO HORMON ANTINEOPL SQ/IM: CPT

## 2020-07-20 RX ADMIN — GOSERELIN ACETATE 3.6 MG: 3.6 IMPLANT SUBCUTANEOUS at 13:44

## 2020-07-20 NOTE — PROGRESS NOTES
Arrived to the [facility]. Zoladex Injection completed in LLQ of abdomen. Patient tolerated very well. Any issues or concerns during appointment: none. Patient aware of next infusion appointment on 8/17/2020 @ 1300. Discharged ambulatory to private residence. Levi Briceno

## 2020-07-22 ENCOUNTER — HOSPITAL ENCOUNTER (OUTPATIENT)
Dept: INFUSION THERAPY | Age: 30
End: 2020-07-22

## 2020-08-13 ENCOUNTER — HOSPITAL ENCOUNTER (OUTPATIENT)
Dept: GENERAL RADIOLOGY | Age: 30
Discharge: HOME | End: 2020-08-13

## 2020-08-13 ENCOUNTER — APPOINTMENT (OUTPATIENT)
Dept: RADIATION ONCOLOGY | Age: 30
End: 2020-08-13

## 2020-08-13 DIAGNOSIS — C50.411 MALIGNANT NEOPLASM OF UPPER-OUTER QUADRANT OF RIGHT BREAST IN FEMALE, ESTROGEN RECEPTOR POSITIVE (HCC): ICD-10-CM

## 2020-08-13 DIAGNOSIS — Z17.0 MALIGNANT NEOPLASM OF UPPER-OUTER QUADRANT OF RIGHT BREAST IN FEMALE, ESTROGEN RECEPTOR POSITIVE (HCC): ICD-10-CM

## 2020-08-13 PROBLEM — Z79.811 AROMATASE INHIBITOR USE: Status: ACTIVE | Noted: 2019-12-10

## 2020-08-13 PROBLEM — M25.50 JOINT PAIN: Status: ACTIVE | Noted: 2020-08-13

## 2020-08-13 PROBLEM — M54.50 ACUTE MIDLINE LOW BACK PAIN WITHOUT SCIATICA: Status: ACTIVE | Noted: 2020-08-13

## 2020-08-13 PROBLEM — T45.1X5A HOT FLASHES RELATED TO AROMATASE INHIBITOR THERAPY: Status: ACTIVE | Noted: 2020-08-13

## 2020-08-13 PROBLEM — R23.2 HOT FLASHES RELATED TO AROMATASE INHIBITOR THERAPY: Status: ACTIVE | Noted: 2020-08-13

## 2020-08-17 ENCOUNTER — HOSPITAL ENCOUNTER (OUTPATIENT)
Dept: INFUSION THERAPY | Age: 30
Discharge: HOME | End: 2020-08-17
Payer: COMMERCIAL

## 2020-08-17 VITALS
WEIGHT: 146.2 LBS | SYSTOLIC BLOOD PRESSURE: 141 MMHG | HEART RATE: 79 BPM | RESPIRATION RATE: 19 BRPM | DIASTOLIC BLOOD PRESSURE: 82 MMHG | BODY MASS INDEX: 24.33 KG/M2 | TEMPERATURE: 97.7 F | OXYGEN SATURATION: 98 %

## 2020-08-17 DIAGNOSIS — C50.411 MALIGNANT NEOPLASM OF UPPER-OUTER QUADRANT OF RIGHT BREAST IN FEMALE, ESTROGEN RECEPTOR POSITIVE (HCC): Primary | ICD-10-CM

## 2020-08-17 DIAGNOSIS — Z17.0 MALIGNANT NEOPLASM OF UPPER-OUTER QUADRANT OF RIGHT BREAST IN FEMALE, ESTROGEN RECEPTOR POSITIVE (HCC): Primary | ICD-10-CM

## 2020-08-17 PROCEDURE — 96402 CHEMO HORMON ANTINEOPL SQ/IM: CPT

## 2020-08-17 PROCEDURE — 74011250636 HC RX REV CODE- 250/636: Performed by: NURSE PRACTITIONER

## 2020-08-17 RX ADMIN — GOSERELIN ACETATE 3.6 MG: 3.6 IMPLANT SUBCUTANEOUS at 13:45

## 2020-08-17 NOTE — PROGRESS NOTES
Arrived to the [facility]. Zoladex completed.    Provided education on Zoladex-patient has previously received this medication   Patient instructed to report any side affects to ordering provider-  Patient tolerated well   Any issues or concerns during appointment: No  Patient aware of next infusion appointment on Monday,October 19th @ 1030  Discharged home ambulatory

## 2020-08-21 ENCOUNTER — APPOINTMENT (OUTPATIENT)
Dept: INFUSION THERAPY | Age: 30
End: 2020-08-21
Payer: COMMERCIAL

## 2020-09-21 ENCOUNTER — HOSPITAL ENCOUNTER (OUTPATIENT)
Dept: INFUSION THERAPY | Age: 30
Discharge: HOME | End: 2020-09-21
Payer: COMMERCIAL

## 2020-09-21 ENCOUNTER — APPOINTMENT (OUTPATIENT)
Dept: INFUSION THERAPY | Age: 30
End: 2020-09-21
Payer: COMMERCIAL

## 2020-09-21 VITALS
SYSTOLIC BLOOD PRESSURE: 137 MMHG | HEART RATE: 75 BPM | DIASTOLIC BLOOD PRESSURE: 73 MMHG | TEMPERATURE: 97.7 F | OXYGEN SATURATION: 98 % | RESPIRATION RATE: 18 BRPM

## 2020-09-21 DIAGNOSIS — C50.411 MALIGNANT NEOPLASM OF UPPER-OUTER QUADRANT OF RIGHT BREAST IN FEMALE, ESTROGEN RECEPTOR POSITIVE (HCC): Primary | ICD-10-CM

## 2020-09-21 DIAGNOSIS — Z17.0 MALIGNANT NEOPLASM OF UPPER-OUTER QUADRANT OF RIGHT BREAST IN FEMALE, ESTROGEN RECEPTOR POSITIVE (HCC): Primary | ICD-10-CM

## 2020-09-21 PROCEDURE — 74011250636 HC RX REV CODE- 250/636: Performed by: INTERNAL MEDICINE

## 2020-09-21 PROCEDURE — 96402 CHEMO HORMON ANTINEOPL SQ/IM: CPT

## 2020-09-21 RX ADMIN — GOSERELIN ACETATE 3.6 MG: 3.6 IMPLANT SUBCUTANEOUS at 15:10

## 2020-09-21 NOTE — PROGRESS NOTES
Arrived to the [facility]. Zoladex completed.    Provided education on Zoladex-patient has previously received this medication  Patient instructed to report any side affects to ordering provider-  Patient tolerated well  Any issues or concerns during appointment: No  Patient aware of next infusion appointment on Monday,October 19th @ 1030  Discharged home ambulatory

## 2020-10-19 ENCOUNTER — HOSPITAL ENCOUNTER (OUTPATIENT)
Dept: INFUSION THERAPY | Age: 30
Discharge: HOME | End: 2020-10-19
Payer: COMMERCIAL

## 2020-10-19 ENCOUNTER — HOSPITAL ENCOUNTER (OUTPATIENT)
Dept: LAB | Age: 30
Discharge: HOME | End: 2020-10-19
Payer: COMMERCIAL

## 2020-10-19 DIAGNOSIS — C50.411 MALIGNANT NEOPLASM OF UPPER-OUTER QUADRANT OF RIGHT BREAST IN FEMALE, ESTROGEN RECEPTOR POSITIVE (HCC): ICD-10-CM

## 2020-10-19 DIAGNOSIS — Z17.0 MALIGNANT NEOPLASM OF UPPER-OUTER QUADRANT OF RIGHT BREAST IN FEMALE, ESTROGEN RECEPTOR POSITIVE (HCC): ICD-10-CM

## 2020-10-19 DIAGNOSIS — Z23 INFLUENZA VACCINE NEEDED: Primary | ICD-10-CM

## 2020-10-19 LAB
ALBUMIN SERPL-MCNC: 4 G/DL (ref 3.5–5)
ALBUMIN/GLOB SERPL: 1.1 {RATIO} (ref 1.2–3.5)
ALP SERPL-CCNC: 77 U/L (ref 50–136)
ALT SERPL-CCNC: 12 U/L (ref 12–65)
ANION GAP SERPL CALC-SCNC: 4 MMOL/L (ref 7–16)
AST SERPL-CCNC: 12 U/L (ref 15–37)
BASOPHILS # BLD: 0 K/UL (ref 0–0.2)
BASOPHILS NFR BLD: 1 % (ref 0–2)
BILIRUB SERPL-MCNC: 0.4 MG/DL (ref 0.2–1.1)
BUN SERPL-MCNC: 14 MG/DL (ref 6–23)
CALCIUM SERPL-MCNC: 9.4 MG/DL (ref 8.3–10.4)
CHLORIDE SERPL-SCNC: 106 MMOL/L (ref 98–107)
CO2 SERPL-SCNC: 28 MMOL/L (ref 21–32)
CREAT SERPL-MCNC: 0.8 MG/DL (ref 0.6–1)
DIFFERENTIAL METHOD BLD: ABNORMAL
EOSINOPHIL # BLD: 0 K/UL (ref 0–0.8)
EOSINOPHIL NFR BLD: 0 % (ref 0.5–7.8)
ERYTHROCYTE [DISTWIDTH] IN BLOOD BY AUTOMATED COUNT: 12.2 % (ref 11.9–14.6)
GLOBULIN SER CALC-MCNC: 3.6 G/DL (ref 2.3–3.5)
GLUCOSE SERPL-MCNC: 78 MG/DL (ref 65–100)
HCT VFR BLD AUTO: 40.2 % (ref 35.8–46.3)
HGB BLD-MCNC: 13.9 G/DL (ref 11.7–15.4)
IMM GRANULOCYTES # BLD AUTO: 0 K/UL (ref 0–0.5)
IMM GRANULOCYTES NFR BLD AUTO: 0 % (ref 0–5)
LYMPHOCYTES # BLD: 1 K/UL (ref 0.5–4.6)
LYMPHOCYTES NFR BLD: 26 % (ref 13–44)
MCH RBC QN AUTO: 31.2 PG (ref 26.1–32.9)
MCHC RBC AUTO-ENTMCNC: 34.6 G/DL (ref 31.4–35)
MCV RBC AUTO: 90.3 FL (ref 79.6–97.8)
MONOCYTES # BLD: 0.3 K/UL (ref 0.1–1.3)
MONOCYTES NFR BLD: 8 % (ref 4–12)
NEUTS SEG # BLD: 2.6 K/UL (ref 1.7–8.2)
NEUTS SEG NFR BLD: 65 % (ref 43–78)
NRBC # BLD: 0 K/UL (ref 0–0.2)
PLATELET # BLD AUTO: 214 K/UL (ref 150–450)
PMV BLD AUTO: 10.2 FL (ref 9.4–12.3)
POTASSIUM SERPL-SCNC: 4.5 MMOL/L (ref 3.5–5.1)
PROT SERPL-MCNC: 7.6 G/DL (ref 6.3–8.2)
RBC # BLD AUTO: 4.45 M/UL (ref 4.05–5.25)
SODIUM SERPL-SCNC: 138 MMOL/L (ref 136–145)
WBC # BLD AUTO: 4 K/UL (ref 4.3–11.1)

## 2020-10-19 PROCEDURE — 85025 COMPLETE CBC W/AUTO DIFF WBC: CPT

## 2020-10-19 PROCEDURE — 80053 COMPREHEN METABOLIC PANEL: CPT

## 2020-10-19 PROCEDURE — 90471 IMMUNIZATION ADMIN: CPT

## 2020-10-19 PROCEDURE — 90686 IIV4 VACC NO PRSV 0.5 ML IM: CPT | Performed by: INTERNAL MEDICINE

## 2020-10-19 PROCEDURE — 36415 COLL VENOUS BLD VENIPUNCTURE: CPT

## 2020-10-19 PROCEDURE — 74011250636 HC RX REV CODE- 250/636: Performed by: INTERNAL MEDICINE

## 2020-10-19 PROCEDURE — 96402 CHEMO HORMON ANTINEOPL SQ/IM: CPT

## 2020-10-19 RX ADMIN — INFLUENZA VIRUS VACCINE 0.5 ML: 15; 15; 15; 15 SUSPENSION INTRAMUSCULAR at 10:33

## 2020-10-19 RX ADMIN — GOSERELIN ACETATE 3.6 MG: 3.6 IMPLANT SUBCUTANEOUS at 10:36

## 2020-10-19 NOTE — PROGRESS NOTES
[facility] Flu vaccine and Zoladex injection completed. Patient tolerated well. Any issues or concerns during appointment: none  Patient aware of next infusion appointment on 11/16 at 1330. Discharged ambulatory to home.

## 2020-11-16 ENCOUNTER — HOSPITAL ENCOUNTER (OUTPATIENT)
Dept: INFUSION THERAPY | Age: 30
Discharge: HOME | End: 2020-11-16
Payer: COMMERCIAL

## 2020-11-16 VITALS
HEART RATE: 61 BPM | RESPIRATION RATE: 18 BRPM | OXYGEN SATURATION: 100 % | DIASTOLIC BLOOD PRESSURE: 84 MMHG | SYSTOLIC BLOOD PRESSURE: 123 MMHG

## 2020-11-16 DIAGNOSIS — C50.411 MALIGNANT NEOPLASM OF UPPER-OUTER QUADRANT OF RIGHT BREAST IN FEMALE, ESTROGEN RECEPTOR POSITIVE (HCC): ICD-10-CM

## 2020-11-16 DIAGNOSIS — Z79.811 AROMATASE INHIBITOR USE: ICD-10-CM

## 2020-11-16 DIAGNOSIS — Z17.0 MALIGNANT NEOPLASM OF UPPER-OUTER QUADRANT OF RIGHT BREAST IN FEMALE, ESTROGEN RECEPTOR POSITIVE (HCC): ICD-10-CM

## 2020-11-16 DIAGNOSIS — Z23 INFLUENZA VACCINE NEEDED: Primary | ICD-10-CM

## 2020-11-16 PROCEDURE — 74011250636 HC RX REV CODE- 250/636: Performed by: INTERNAL MEDICINE

## 2020-11-16 PROCEDURE — 96402 CHEMO HORMON ANTINEOPL SQ/IM: CPT

## 2020-11-16 RX ADMIN — GOSERELIN ACETATE 3.6 MG: 3.6 IMPLANT SUBCUTANEOUS at 14:10

## 2020-11-16 NOTE — PROGRESS NOTES
Arrived to the [facility]. Zoladex completed.    Provided education on Zoladex-patient has previously received this medication  Patient instructed to report any side affects to ordering provider-  Patient tolerated well  Any issues or concerns during appointment: No  Patient aware of next infusion appointment on Monday,December 14th @ 1000  Discharged home ambulatory

## 2020-12-11 ENCOUNTER — HOSPITAL ENCOUNTER (OUTPATIENT)
Dept: CT IMAGING | Age: 30
Discharge: HOME | End: 2020-12-11
Attending: INTERNAL MEDICINE
Payer: COMMERCIAL

## 2020-12-11 DIAGNOSIS — R07.81 RIB PAIN ON RIGHT SIDE: ICD-10-CM

## 2020-12-11 DIAGNOSIS — C50.411 MALIGNANT NEOPLASM OF UPPER-OUTER QUADRANT OF RIGHT BREAST IN FEMALE, ESTROGEN RECEPTOR POSITIVE (HCC): ICD-10-CM

## 2020-12-11 DIAGNOSIS — Z17.0 MALIGNANT NEOPLASM OF UPPER-OUTER QUADRANT OF RIGHT BREAST IN FEMALE, ESTROGEN RECEPTOR POSITIVE (HCC): ICD-10-CM

## 2020-12-11 PROCEDURE — 74011000258 HC RX REV CODE- 258: Performed by: INTERNAL MEDICINE

## 2020-12-11 PROCEDURE — 71260 CT THORAX DX C+: CPT

## 2020-12-11 PROCEDURE — 74011000636 HC RX REV CODE- 636: Performed by: INTERNAL MEDICINE

## 2020-12-11 RX ORDER — SODIUM CHLORIDE 0.9 % (FLUSH) 0.9 %
10 SYRINGE (ML) INJECTION
Status: COMPLETED | OUTPATIENT
Start: 2020-12-11 | End: 2020-12-11

## 2020-12-11 RX ADMIN — Medication 10 ML: at 08:32

## 2020-12-11 RX ADMIN — IOPAMIDOL 100 ML: 755 INJECTION, SOLUTION INTRAVENOUS at 08:32

## 2020-12-11 RX ADMIN — SODIUM CHLORIDE 100 ML: 900 INJECTION, SOLUTION INTRAVENOUS at 08:32

## 2020-12-14 ENCOUNTER — HOSPITAL ENCOUNTER (OUTPATIENT)
Dept: INFUSION THERAPY | Age: 30
Discharge: HOME | End: 2020-12-14
Payer: COMMERCIAL

## 2020-12-14 ENCOUNTER — HOSPITAL ENCOUNTER (OUTPATIENT)
Dept: LAB | Age: 30
Discharge: HOME | End: 2020-12-14
Payer: COMMERCIAL

## 2020-12-14 DIAGNOSIS — C50.411 MALIGNANT NEOPLASM OF UPPER-OUTER QUADRANT OF RIGHT BREAST IN FEMALE, ESTROGEN RECEPTOR POSITIVE (HCC): ICD-10-CM

## 2020-12-14 DIAGNOSIS — Z17.0 MALIGNANT NEOPLASM OF UPPER-OUTER QUADRANT OF RIGHT BREAST IN FEMALE, ESTROGEN RECEPTOR POSITIVE (HCC): ICD-10-CM

## 2020-12-14 DIAGNOSIS — Z23 INFLUENZA VACCINE NEEDED: Primary | ICD-10-CM

## 2020-12-14 LAB
ALBUMIN SERPL-MCNC: 4.9 G/DL (ref 3.5–5)
ALBUMIN/GLOB SERPL: 1.6 {RATIO} (ref 1.2–3.5)
ALP SERPL-CCNC: 84 U/L (ref 50–136)
ALT SERPL-CCNC: 15 U/L (ref 12–65)
ANION GAP SERPL CALC-SCNC: 6 MMOL/L (ref 7–16)
AST SERPL-CCNC: 13 U/L (ref 15–37)
BASOPHILS # BLD: 0 K/UL (ref 0–0.2)
BASOPHILS NFR BLD: 1 % (ref 0–2)
BILIRUB SERPL-MCNC: 0.4 MG/DL (ref 0.2–1.1)
BUN SERPL-MCNC: 12 MG/DL (ref 6–23)
CALCIUM SERPL-MCNC: 9.7 MG/DL (ref 8.3–10.4)
CHLORIDE SERPL-SCNC: 104 MMOL/L (ref 98–107)
CO2 SERPL-SCNC: 28 MMOL/L (ref 21–32)
CREAT SERPL-MCNC: 0.8 MG/DL (ref 0.6–1)
DIFFERENTIAL METHOD BLD: ABNORMAL
EOSINOPHIL # BLD: 0 K/UL (ref 0–0.8)
EOSINOPHIL NFR BLD: 0 % (ref 0.5–7.8)
ERYTHROCYTE [DISTWIDTH] IN BLOOD BY AUTOMATED COUNT: 11.9 % (ref 11.9–14.6)
GLOBULIN SER CALC-MCNC: 3 G/DL (ref 2.3–3.5)
GLUCOSE SERPL-MCNC: 84 MG/DL (ref 65–100)
HCT VFR BLD AUTO: 40.8 % (ref 35.8–46.3)
HGB BLD-MCNC: 14.2 G/DL (ref 11.7–15.4)
IMM GRANULOCYTES # BLD AUTO: 0 K/UL (ref 0–0.5)
IMM GRANULOCYTES NFR BLD AUTO: 0 % (ref 0–5)
LYMPHOCYTES # BLD: 1.3 K/UL (ref 0.5–4.6)
LYMPHOCYTES NFR BLD: 28 % (ref 13–44)
MCH RBC QN AUTO: 30.9 PG (ref 26.1–32.9)
MCHC RBC AUTO-ENTMCNC: 34.8 G/DL (ref 31.4–35)
MCV RBC AUTO: 88.7 FL (ref 79.6–97.8)
MONOCYTES # BLD: 0.3 K/UL (ref 0.1–1.3)
MONOCYTES NFR BLD: 7 % (ref 4–12)
NEUTS SEG # BLD: 2.9 K/UL (ref 1.7–8.2)
NEUTS SEG NFR BLD: 64 % (ref 43–78)
NRBC # BLD: 0 K/UL (ref 0–0.2)
PLATELET # BLD AUTO: 233 K/UL (ref 150–450)
PMV BLD AUTO: 10.2 FL (ref 9.4–12.3)
POTASSIUM SERPL-SCNC: 4.1 MMOL/L (ref 3.5–5.1)
PROT SERPL-MCNC: 7.9 G/DL (ref 6.3–8.2)
RBC # BLD AUTO: 4.6 M/UL (ref 4.05–5.25)
SODIUM SERPL-SCNC: 138 MMOL/L (ref 136–145)
WBC # BLD AUTO: 4.5 K/UL (ref 4.3–11.1)

## 2020-12-14 PROCEDURE — 85025 COMPLETE CBC W/AUTO DIFF WBC: CPT

## 2020-12-14 PROCEDURE — 96402 CHEMO HORMON ANTINEOPL SQ/IM: CPT

## 2020-12-14 PROCEDURE — 74011250636 HC RX REV CODE- 250/636: Performed by: INTERNAL MEDICINE

## 2020-12-14 PROCEDURE — 36415 COLL VENOUS BLD VENIPUNCTURE: CPT

## 2020-12-14 PROCEDURE — 80053 COMPREHEN METABOLIC PANEL: CPT

## 2020-12-14 RX ADMIN — GOSERELIN ACETATE 3.6 MG: 3.6 IMPLANT SUBCUTANEOUS at 14:25

## 2020-12-23 ENCOUNTER — HOSPITAL ENCOUNTER (OUTPATIENT)
Dept: MRI IMAGING | Age: 30
Discharge: HOME | End: 2020-12-23
Attending: INTERNAL MEDICINE
Payer: COMMERCIAL

## 2020-12-23 DIAGNOSIS — Z17.0 MALIGNANT NEOPLASM OF UPPER-OUTER QUADRANT OF RIGHT BREAST IN FEMALE, ESTROGEN RECEPTOR POSITIVE (HCC): ICD-10-CM

## 2020-12-23 DIAGNOSIS — R07.81 RIB PAIN: ICD-10-CM

## 2020-12-23 DIAGNOSIS — N64.4 BREAST PAIN: ICD-10-CM

## 2020-12-23 DIAGNOSIS — C50.411 MALIGNANT NEOPLASM OF UPPER-OUTER QUADRANT OF RIGHT BREAST IN FEMALE, ESTROGEN RECEPTOR POSITIVE (HCC): ICD-10-CM

## 2020-12-23 PROCEDURE — C8937 CAD BREAST MRI: HCPCS

## 2020-12-23 PROCEDURE — 74011250636 HC RX REV CODE- 250/636: Performed by: INTERNAL MEDICINE

## 2020-12-23 PROCEDURE — A9575 INJ GADOTERATE MEGLUMI 0.1ML: HCPCS | Performed by: INTERNAL MEDICINE

## 2020-12-23 RX ORDER — GADOTERATE MEGLUMINE 376.9 MG/ML
15 INJECTION INTRAVENOUS
Status: COMPLETED | OUTPATIENT
Start: 2020-12-23 | End: 2020-12-23

## 2020-12-23 RX ORDER — SODIUM CHLORIDE 0.9 % (FLUSH) 0.9 %
10 SYRINGE (ML) INJECTION
Status: COMPLETED | OUTPATIENT
Start: 2020-12-23 | End: 2020-12-23

## 2020-12-23 RX ADMIN — Medication 10 ML: at 09:55

## 2020-12-23 RX ADMIN — GADOTERATE MEGLUMINE 15 ML: 376.9 INJECTION INTRAVENOUS at 09:55

## 2021-01-05 ENCOUNTER — HOSPITAL ENCOUNTER (OUTPATIENT)
Dept: MAMMOGRAPHY | Age: 31
Discharge: HOME | End: 2021-01-05
Attending: INTERNAL MEDICINE
Payer: COMMERCIAL

## 2021-01-05 DIAGNOSIS — C50.411 MALIGNANT NEOPLASM OF UPPER-OUTER QUADRANT OF RIGHT BREAST IN FEMALE, ESTROGEN RECEPTOR POSITIVE (HCC): ICD-10-CM

## 2021-01-05 DIAGNOSIS — R92.8 ABNORMAL MRI, BREAST: ICD-10-CM

## 2021-01-05 DIAGNOSIS — N64.4 BREAST PAIN: ICD-10-CM

## 2021-01-05 DIAGNOSIS — Z17.0 MALIGNANT NEOPLASM OF UPPER-OUTER QUADRANT OF RIGHT BREAST IN FEMALE, ESTROGEN RECEPTOR POSITIVE (HCC): ICD-10-CM

## 2021-01-05 PROCEDURE — 76642 ULTRASOUND BREAST LIMITED: CPT

## 2021-01-05 PROCEDURE — 77062 BREAST TOMOSYNTHESIS BI: CPT

## 2021-01-05 PROCEDURE — 77066 DX MAMMO INCL CAD BI: CPT

## 2021-01-11 ENCOUNTER — HOSPITAL ENCOUNTER (OUTPATIENT)
Dept: INFUSION THERAPY | Age: 31
Discharge: HOME | End: 2021-01-11
Payer: COMMERCIAL

## 2021-01-11 VITALS
TEMPERATURE: 97.1 F | SYSTOLIC BLOOD PRESSURE: 126 MMHG | HEART RATE: 68 BPM | RESPIRATION RATE: 16 BRPM | DIASTOLIC BLOOD PRESSURE: 82 MMHG | OXYGEN SATURATION: 100 %

## 2021-01-11 DIAGNOSIS — Z17.0 MALIGNANT NEOPLASM OF UPPER-OUTER QUADRANT OF RIGHT BREAST IN FEMALE, ESTROGEN RECEPTOR POSITIVE (HCC): ICD-10-CM

## 2021-01-11 DIAGNOSIS — Z23 INFLUENZA VACCINE NEEDED: Primary | ICD-10-CM

## 2021-01-11 DIAGNOSIS — C50.411 MALIGNANT NEOPLASM OF UPPER-OUTER QUADRANT OF RIGHT BREAST IN FEMALE, ESTROGEN RECEPTOR POSITIVE (HCC): ICD-10-CM

## 2021-01-11 PROCEDURE — 74011250636 HC RX REV CODE- 250/636: Performed by: INTERNAL MEDICINE

## 2021-01-11 PROCEDURE — 96402 CHEMO HORMON ANTINEOPL SQ/IM: CPT

## 2021-01-11 RX ADMIN — GOSERELIN ACETATE 3.6 MG: 3.6 IMPLANT SUBCUTANEOUS at 16:14

## 2021-01-11 NOTE — PROGRESS NOTES
Arrived to the [facility]. Zoladex injection completed. Patient tolerated well. Any issues or concerns during appointment: none  Patient aware of next infusion appointment on 2/8/21 at   Discharged ambulatory to home.

## 2021-01-13 ENCOUNTER — HOSPITAL ENCOUNTER (OUTPATIENT)
Dept: PET IMAGING | Age: 31
Discharge: HOME | End: 2021-01-13
Payer: COMMERCIAL

## 2021-01-13 DIAGNOSIS — R92.8 ABNORMAL MRI, BREAST: ICD-10-CM

## 2021-01-13 DIAGNOSIS — C50.411 MALIGNANT NEOPLASM OF UPPER-OUTER QUADRANT OF RIGHT BREAST IN FEMALE, ESTROGEN RECEPTOR POSITIVE (HCC): ICD-10-CM

## 2021-01-13 DIAGNOSIS — Z17.0 MALIGNANT NEOPLASM OF UPPER-OUTER QUADRANT OF RIGHT BREAST IN FEMALE, ESTROGEN RECEPTOR POSITIVE (HCC): ICD-10-CM

## 2021-01-13 PROCEDURE — 74011000636 HC RX REV CODE- 636: Performed by: INTERNAL MEDICINE

## 2021-01-13 PROCEDURE — A9552 F18 FDG: HCPCS

## 2021-01-13 RX ORDER — SODIUM CHLORIDE 0.9 % (FLUSH) 0.9 %
10 SYRINGE (ML) INJECTION
Status: COMPLETED | OUTPATIENT
Start: 2021-01-13 | End: 2021-01-13

## 2021-01-13 RX ADMIN — Medication 10 ML: at 11:33

## 2021-01-13 RX ADMIN — DIATRIZOATE MEGLUMINE AND DIATRIZOATE SODIUM 10 ML: 660; 100 LIQUID ORAL; RECTAL at 11:33

## 2021-02-08 ENCOUNTER — HOSPITAL ENCOUNTER (OUTPATIENT)
Dept: LAB | Age: 31
Discharge: HOME | End: 2021-02-08
Payer: COMMERCIAL

## 2021-02-08 ENCOUNTER — HOSPITAL ENCOUNTER (OUTPATIENT)
Dept: INFUSION THERAPY | Age: 31
Discharge: HOME | End: 2021-02-08
Payer: COMMERCIAL

## 2021-02-08 VITALS — HEIGHT: 65 IN | BODY MASS INDEX: 25.46 KG/M2

## 2021-02-08 DIAGNOSIS — C50.411 MALIGNANT NEOPLASM OF UPPER-OUTER QUADRANT OF RIGHT BREAST IN FEMALE, ESTROGEN RECEPTOR POSITIVE (HCC): ICD-10-CM

## 2021-02-08 DIAGNOSIS — Z23 INFLUENZA VACCINE NEEDED: Primary | ICD-10-CM

## 2021-02-08 DIAGNOSIS — Z17.0 MALIGNANT NEOPLASM OF UPPER-OUTER QUADRANT OF RIGHT BREAST IN FEMALE, ESTROGEN RECEPTOR POSITIVE (HCC): ICD-10-CM

## 2021-02-08 DIAGNOSIS — R23.3 EASY BRUISING: ICD-10-CM

## 2021-02-08 LAB
25(OH)D3 SERPL-MCNC: 31.7 NG/ML (ref 30–100)
ALBUMIN SERPL-MCNC: 4.5 G/DL (ref 3.5–5)
ALBUMIN/GLOB SERPL: 1.3 {RATIO} (ref 1.2–3.5)
ALP SERPL-CCNC: 83 U/L (ref 50–136)
ALT SERPL-CCNC: 19 U/L (ref 12–65)
ANION GAP SERPL CALC-SCNC: 3 MMOL/L (ref 7–16)
AST SERPL-CCNC: 12 U/L (ref 15–37)
BASOPHILS # BLD: 0 K/UL (ref 0–0.2)
BASOPHILS NFR BLD: 1 % (ref 0–2)
BILIRUB SERPL-MCNC: 0.4 MG/DL (ref 0.2–1.1)
BUN SERPL-MCNC: 10 MG/DL (ref 6–23)
CALCIUM SERPL-MCNC: 9.7 MG/DL (ref 8.3–10.4)
CHLORIDE SERPL-SCNC: 104 MMOL/L (ref 98–107)
CO2 SERPL-SCNC: 32 MMOL/L (ref 21–32)
CREAT SERPL-MCNC: 0.8 MG/DL (ref 0.6–1)
DIFFERENTIAL METHOD BLD: ABNORMAL
EOSINOPHIL # BLD: 0 K/UL (ref 0–0.8)
EOSINOPHIL NFR BLD: 1 % (ref 0.5–7.8)
ERYTHROCYTE [DISTWIDTH] IN BLOOD BY AUTOMATED COUNT: 12 % (ref 11.9–14.6)
FERRITIN SERPL-MCNC: 16 NG/ML (ref 8–388)
FOLATE SERPL-MCNC: 11.5 NG/ML (ref 3.1–17.5)
GLOBULIN SER CALC-MCNC: 3.5 G/DL (ref 2.3–3.5)
GLUCOSE SERPL-MCNC: 88 MG/DL (ref 65–100)
HCT VFR BLD AUTO: 40.6 % (ref 35.8–46.3)
HGB BLD-MCNC: 13.8 G/DL (ref 11.7–15.4)
IMM GRANULOCYTES # BLD AUTO: 0.1 K/UL (ref 0–0.5)
IMM GRANULOCYTES NFR BLD AUTO: 2 % (ref 0–5)
IRON SATN MFR SERPL: 23 %
IRON SERPL-MCNC: 90 UG/DL (ref 35–150)
LYMPHOCYTES # BLD: 1.2 K/UL (ref 0.5–4.6)
LYMPHOCYTES NFR BLD: 29 % (ref 13–44)
MCH RBC QN AUTO: 30.4 PG (ref 26.1–32.9)
MCHC RBC AUTO-ENTMCNC: 34 G/DL (ref 31.4–35)
MCV RBC AUTO: 89.4 FL (ref 79.6–97.8)
MONOCYTES # BLD: 0.4 K/UL (ref 0.1–1.3)
MONOCYTES NFR BLD: 9 % (ref 4–12)
NEUTS SEG # BLD: 2.4 K/UL (ref 1.7–8.2)
NEUTS SEG NFR BLD: 59 % (ref 43–78)
NRBC # BLD: 0 K/UL (ref 0–0.2)
PLATELET # BLD AUTO: 243 K/UL (ref 150–450)
PMV BLD AUTO: 10.3 FL (ref 9.4–12.3)
POTASSIUM SERPL-SCNC: 4 MMOL/L (ref 3.5–5.1)
PROT SERPL-MCNC: 8 G/DL (ref 6.3–8.2)
RBC # BLD AUTO: 4.54 M/UL (ref 4.05–5.25)
SODIUM SERPL-SCNC: 139 MMOL/L (ref 136–145)
TIBC SERPL-MCNC: 384 UG/DL (ref 250–450)
TSH SERPL DL<=0.005 MIU/L-ACNC: 1.31 UIU/ML (ref 0.36–3.74)
VIT B12 SERPL-MCNC: 172 PG/ML (ref 193–986)
WBC # BLD AUTO: 4 K/UL (ref 4.3–11.1)

## 2021-02-08 PROCEDURE — 82306 VITAMIN D 25 HYDROXY: CPT

## 2021-02-08 PROCEDURE — 36415 COLL VENOUS BLD VENIPUNCTURE: CPT

## 2021-02-08 PROCEDURE — 82607 VITAMIN B-12: CPT

## 2021-02-08 PROCEDURE — 84443 ASSAY THYROID STIM HORMONE: CPT

## 2021-02-08 PROCEDURE — 80053 COMPREHEN METABOLIC PANEL: CPT

## 2021-02-08 PROCEDURE — 83550 IRON BINDING TEST: CPT

## 2021-02-08 PROCEDURE — 96402 CHEMO HORMON ANTINEOPL SQ/IM: CPT

## 2021-02-08 PROCEDURE — 74011250636 HC RX REV CODE- 250/636: Performed by: INTERNAL MEDICINE

## 2021-02-08 PROCEDURE — 85025 COMPLETE CBC W/AUTO DIFF WBC: CPT

## 2021-02-08 PROCEDURE — 82746 ASSAY OF FOLIC ACID SERUM: CPT

## 2021-02-08 PROCEDURE — 82728 ASSAY OF FERRITIN: CPT

## 2021-02-08 RX ADMIN — GOSERELIN ACETATE 3.6 MG: 3.6 IMPLANT SUBCUTANEOUS at 16:41

## 2021-02-08 NOTE — PROGRESS NOTES
Pt arrived ambulatory to [facility]. Zoladex SQ. Pt aware of next appt on 3/8/21 at 1400. Discharged ambulatory.

## 2021-02-09 PROBLEM — R23.3 EASY BRUISING: Status: ACTIVE | Noted: 2021-02-09

## 2021-02-09 PROBLEM — M54.50 ACUTE MIDLINE LOW BACK PAIN WITHOUT SCIATICA: Status: RESOLVED | Noted: 2020-08-13 | Resolved: 2021-02-09

## 2021-02-09 PROBLEM — R92.8 ABNORMAL MRI, BREAST: Status: ACTIVE | Noted: 2021-02-09

## 2021-02-12 PROBLEM — E53.8 LOW SERUM VITAMIN B12: Status: ACTIVE | Noted: 2021-02-12

## 2021-02-22 ENCOUNTER — HOSPITAL ENCOUNTER (OUTPATIENT)
Dept: INFUSION THERAPY | Age: 31
Discharge: HOME | End: 2021-02-22
Payer: COMMERCIAL

## 2021-02-22 VITALS
SYSTOLIC BLOOD PRESSURE: 143 MMHG | TEMPERATURE: 97.4 F | RESPIRATION RATE: 16 BRPM | OXYGEN SATURATION: 100 % | HEART RATE: 60 BPM | DIASTOLIC BLOOD PRESSURE: 83 MMHG

## 2021-02-22 DIAGNOSIS — C50.411 MALIGNANT NEOPLASM OF UPPER-OUTER QUADRANT OF RIGHT BREAST IN FEMALE, ESTROGEN RECEPTOR POSITIVE (HCC): ICD-10-CM

## 2021-02-22 DIAGNOSIS — Z23 INFLUENZA VACCINE NEEDED: ICD-10-CM

## 2021-02-22 DIAGNOSIS — E53.8 LOW SERUM VITAMIN B12: Primary | ICD-10-CM

## 2021-02-22 DIAGNOSIS — Z17.0 MALIGNANT NEOPLASM OF UPPER-OUTER QUADRANT OF RIGHT BREAST IN FEMALE, ESTROGEN RECEPTOR POSITIVE (HCC): ICD-10-CM

## 2021-02-22 PROCEDURE — 74011250636 HC RX REV CODE- 250/636: Performed by: INTERNAL MEDICINE

## 2021-02-22 PROCEDURE — 96372 THER/PROPH/DIAG INJ SC/IM: CPT

## 2021-02-22 RX ORDER — CYANOCOBALAMIN 1000 UG/ML
1000 INJECTION, SOLUTION INTRAMUSCULAR; SUBCUTANEOUS ONCE
Status: COMPLETED | OUTPATIENT
Start: 2021-02-22 | End: 2021-02-22

## 2021-02-22 RX ADMIN — CYANOCOBALAMIN 1000 MCG: 1000 INJECTION, SOLUTION INTRAMUSCULAR; SUBCUTANEOUS at 16:09

## 2021-02-22 NOTE — PROGRESS NOTES
Arrived to the [facility]. B12 completed. Patient tolerated well. Observed patient x 30 minutes, no reactions. Any issues or concerns during appointment: None. Patient aware of next infusion appointment on 3/1 (date) at 1500 (time). Discharged ambulatory in stable condition.

## 2021-03-01 ENCOUNTER — HOSPITAL ENCOUNTER (OUTPATIENT)
Dept: INFUSION THERAPY | Age: 31
Discharge: HOME | End: 2021-03-01
Payer: COMMERCIAL

## 2021-03-01 VITALS
RESPIRATION RATE: 16 BRPM | HEART RATE: 64 BPM | DIASTOLIC BLOOD PRESSURE: 77 MMHG | TEMPERATURE: 97.1 F | SYSTOLIC BLOOD PRESSURE: 147 MMHG | OXYGEN SATURATION: 100 %

## 2021-03-01 DIAGNOSIS — E53.8 LOW SERUM VITAMIN B12: Primary | ICD-10-CM

## 2021-03-01 PROCEDURE — 74011250636 HC RX REV CODE- 250/636: Performed by: INTERNAL MEDICINE

## 2021-03-01 PROCEDURE — 96372 THER/PROPH/DIAG INJ SC/IM: CPT

## 2021-03-01 RX ORDER — CYANOCOBALAMIN 1000 UG/ML
1000 INJECTION, SOLUTION INTRAMUSCULAR; SUBCUTANEOUS ONCE
Status: COMPLETED | OUTPATIENT
Start: 2021-03-01 | End: 2021-03-01

## 2021-03-01 RX ADMIN — CYANOCOBALAMIN 1000 MCG: 1000 INJECTION, SOLUTION INTRAMUSCULAR; SUBCUTANEOUS at 15:10

## 2021-03-01 NOTE — PROGRESS NOTES
Arrived to the [facility]. B12 injection completed. Patient tolerated well. Any issues or concerns during appointment: none. Patient aware of next infusion appointment on 3/8/21 at 1400. Discharged amb.

## 2021-03-08 ENCOUNTER — HOSPITAL ENCOUNTER (OUTPATIENT)
Dept: INFUSION THERAPY | Age: 31
Discharge: HOME | End: 2021-03-08
Payer: COMMERCIAL

## 2021-03-08 VITALS
TEMPERATURE: 98.1 F | HEART RATE: 70 BPM | DIASTOLIC BLOOD PRESSURE: 81 MMHG | OXYGEN SATURATION: 100 % | RESPIRATION RATE: 16 BRPM | SYSTOLIC BLOOD PRESSURE: 139 MMHG

## 2021-03-08 DIAGNOSIS — Z17.0 MALIGNANT NEOPLASM OF UPPER-OUTER QUADRANT OF RIGHT BREAST IN FEMALE, ESTROGEN RECEPTOR POSITIVE (HCC): ICD-10-CM

## 2021-03-08 DIAGNOSIS — E53.8 LOW SERUM VITAMIN B12: Primary | ICD-10-CM

## 2021-03-08 DIAGNOSIS — C50.411 MALIGNANT NEOPLASM OF UPPER-OUTER QUADRANT OF RIGHT BREAST IN FEMALE, ESTROGEN RECEPTOR POSITIVE (HCC): ICD-10-CM

## 2021-03-08 PROCEDURE — 96372 THER/PROPH/DIAG INJ SC/IM: CPT

## 2021-03-08 PROCEDURE — 96402 CHEMO HORMON ANTINEOPL SQ/IM: CPT

## 2021-03-08 PROCEDURE — 74011250636 HC RX REV CODE- 250/636: Performed by: INTERNAL MEDICINE

## 2021-03-08 RX ORDER — CYANOCOBALAMIN 1000 UG/ML
1000 INJECTION, SOLUTION INTRAMUSCULAR; SUBCUTANEOUS ONCE
Status: COMPLETED | OUTPATIENT
Start: 2021-03-08 | End: 2021-03-08

## 2021-03-08 RX ADMIN — GOSERELIN ACETATE 3.6 MG: 3.6 IMPLANT SUBCUTANEOUS at 15:08

## 2021-03-08 RX ADMIN — CYANOCOBALAMIN 1000 MCG: 1000 INJECTION, SOLUTION INTRAMUSCULAR; SUBCUTANEOUS at 15:03

## 2021-03-08 NOTE — PROGRESS NOTES
Arrived to the [facility] ambulatory. zoladex and Vit-B12 completed. Patient tolerated well. Any issues or concerns during appointment: no.  Patient aware of next infusion appointment on 3/15 at 1400. Discharged to home ambulatory.

## 2021-03-15 ENCOUNTER — HOSPITAL ENCOUNTER (OUTPATIENT)
Dept: INFUSION THERAPY | Age: 31
Discharge: HOME | End: 2021-03-15
Payer: COMMERCIAL

## 2021-03-15 VITALS
OXYGEN SATURATION: 100 % | HEART RATE: 66 BPM | RESPIRATION RATE: 16 BRPM | SYSTOLIC BLOOD PRESSURE: 121 MMHG | DIASTOLIC BLOOD PRESSURE: 73 MMHG | TEMPERATURE: 97.6 F

## 2021-03-15 DIAGNOSIS — E53.8 LOW SERUM VITAMIN B12: Primary | ICD-10-CM

## 2021-03-15 PROCEDURE — 96372 THER/PROPH/DIAG INJ SC/IM: CPT

## 2021-03-15 PROCEDURE — 74011250636 HC RX REV CODE- 250/636: Performed by: INTERNAL MEDICINE

## 2021-03-15 RX ORDER — CYANOCOBALAMIN 1000 UG/ML
1000 INJECTION, SOLUTION INTRAMUSCULAR; SUBCUTANEOUS ONCE
Status: COMPLETED | OUTPATIENT
Start: 2021-03-15 | End: 2021-03-15

## 2021-03-15 RX ADMIN — CYANOCOBALAMIN 1000 MCG: 1000 INJECTION, SOLUTION INTRAMUSCULAR; SUBCUTANEOUS at 15:33

## 2021-03-15 NOTE — PROGRESS NOTES
Arrived to the [facility]. B12 injection completed and tolerated well. Any issues or concerns during appointment: none  Patient given education on injection   Instructed patient to notify provider for any issues or worrisome symptoms. They verbalized understanding. Patient aware of next infusion appointment scheduled for 4/12/21 at 430pm  Discharged ambulatory with self.

## 2021-04-08 ENCOUNTER — HOSPITAL ENCOUNTER (OUTPATIENT)
Dept: MRI IMAGING | Age: 31
Discharge: HOME | End: 2021-04-08
Attending: INTERNAL MEDICINE
Payer: COMMERCIAL

## 2021-04-08 DIAGNOSIS — C50.411 MALIGNANT NEOPLASM OF UPPER-OUTER QUADRANT OF RIGHT BREAST IN FEMALE, ESTROGEN RECEPTOR POSITIVE (HCC): ICD-10-CM

## 2021-04-08 DIAGNOSIS — Z17.0 MALIGNANT NEOPLASM OF UPPER-OUTER QUADRANT OF RIGHT BREAST IN FEMALE, ESTROGEN RECEPTOR POSITIVE (HCC): ICD-10-CM

## 2021-04-08 DIAGNOSIS — R92.8 ABNORMAL MRI, BREAST: ICD-10-CM

## 2021-04-08 PROCEDURE — 74011000258 HC RX REV CODE- 258: Performed by: INTERNAL MEDICINE

## 2021-04-08 PROCEDURE — 77049 MRI BREAST C-+ W/CAD BI: CPT

## 2021-04-08 PROCEDURE — 74011636320 HC RX REV CODE- 636/320: Performed by: INTERNAL MEDICINE

## 2021-04-08 PROCEDURE — A9576 INJ PROHANCE MULTIPACK: HCPCS | Performed by: INTERNAL MEDICINE

## 2021-04-08 RX ORDER — SODIUM CHLORIDE 0.9 % (FLUSH) 0.9 %
10 SYRINGE (ML) INJECTION
Status: COMPLETED | OUTPATIENT
Start: 2021-04-08 | End: 2021-04-08

## 2021-04-08 RX ADMIN — Medication 10 ML: at 08:12

## 2021-04-08 RX ADMIN — GADOTERIDOL 13 ML: 279.3 INJECTION, SOLUTION INTRAVENOUS at 08:22

## 2021-04-08 RX ADMIN — SODIUM CHLORIDE 100 ML: 900 INJECTION, SOLUTION INTRAVENOUS at 08:12

## 2021-04-12 ENCOUNTER — HOSPITAL ENCOUNTER (OUTPATIENT)
Dept: LAB | Age: 31
Discharge: HOME | End: 2021-04-12
Payer: COMMERCIAL

## 2021-04-12 DIAGNOSIS — Z17.0 MALIGNANT NEOPLASM OF UPPER-OUTER QUADRANT OF RIGHT BREAST IN FEMALE, ESTROGEN RECEPTOR POSITIVE (HCC): ICD-10-CM

## 2021-04-12 DIAGNOSIS — C50.411 MALIGNANT NEOPLASM OF UPPER-OUTER QUADRANT OF RIGHT BREAST IN FEMALE, ESTROGEN RECEPTOR POSITIVE (HCC): ICD-10-CM

## 2021-04-12 DIAGNOSIS — Z79.811 AROMATASE INHIBITOR USE: ICD-10-CM

## 2021-04-12 DIAGNOSIS — R23.3 EASY BRUISING: ICD-10-CM

## 2021-04-12 DIAGNOSIS — E53.8 LOW SERUM VITAMIN B12: ICD-10-CM

## 2021-04-12 LAB
25(OH)D3 SERPL-MCNC: 39.3 NG/ML (ref 30–100)
ALBUMIN SERPL-MCNC: 4.5 G/DL (ref 3.5–5)
ALBUMIN/GLOB SERPL: 1.4 {RATIO} (ref 1.2–3.5)
ALP SERPL-CCNC: 86 U/L (ref 50–136)
ALT SERPL-CCNC: 17 U/L (ref 12–65)
ANION GAP SERPL CALC-SCNC: 4 MMOL/L (ref 7–16)
AST SERPL-CCNC: 15 U/L (ref 15–37)
BASOPHILS # BLD: 0 K/UL (ref 0–0.2)
BASOPHILS NFR BLD: 1 % (ref 0–2)
BILIRUB SERPL-MCNC: 0.4 MG/DL (ref 0.2–1.1)
BUN SERPL-MCNC: 16 MG/DL (ref 6–23)
CALCIUM SERPL-MCNC: 9.5 MG/DL (ref 8.3–10.4)
CHLORIDE SERPL-SCNC: 104 MMOL/L (ref 98–107)
CO2 SERPL-SCNC: 28 MMOL/L (ref 21–32)
CREAT SERPL-MCNC: 0.7 MG/DL (ref 0.6–1)
DIFFERENTIAL METHOD BLD: NORMAL
EOSINOPHIL # BLD: 0 K/UL (ref 0–0.8)
EOSINOPHIL NFR BLD: 1 % (ref 0.5–7.8)
ERYTHROCYTE [DISTWIDTH] IN BLOOD BY AUTOMATED COUNT: 12.1 % (ref 11.9–14.6)
GLOBULIN SER CALC-MCNC: 3.3 G/DL (ref 2.3–3.5)
GLUCOSE SERPL-MCNC: 84 MG/DL (ref 65–100)
HCT VFR BLD AUTO: 39.4 % (ref 35.8–46.3)
HGB BLD-MCNC: 13.8 G/DL (ref 11.7–15.4)
IMM GRANULOCYTES # BLD AUTO: 0 K/UL (ref 0–0.5)
IMM GRANULOCYTES NFR BLD AUTO: 0 % (ref 0–5)
LYMPHOCYTES # BLD: 1.4 K/UL (ref 0.5–4.6)
LYMPHOCYTES NFR BLD: 28 % (ref 13–44)
MCH RBC QN AUTO: 31.6 PG (ref 26.1–32.9)
MCHC RBC AUTO-ENTMCNC: 35 G/DL (ref 31.4–35)
MCV RBC AUTO: 90.2 FL (ref 79.6–97.8)
MONOCYTES # BLD: 0.3 K/UL (ref 0.1–1.3)
MONOCYTES NFR BLD: 7 % (ref 4–12)
NEUTS SEG # BLD: 3.2 K/UL (ref 1.7–8.2)
NEUTS SEG NFR BLD: 64 % (ref 43–78)
NRBC # BLD: 0 K/UL (ref 0–0.2)
PLATELET # BLD AUTO: 228 K/UL (ref 150–450)
PMV BLD AUTO: 10.2 FL (ref 9.4–12.3)
POTASSIUM SERPL-SCNC: 4 MMOL/L (ref 3.5–5.1)
PROT SERPL-MCNC: 7.8 G/DL (ref 6.3–8.2)
RBC # BLD AUTO: 4.37 M/UL (ref 4.05–5.2)
SODIUM SERPL-SCNC: 136 MMOL/L (ref 136–145)
VIT B12 SERPL-MCNC: 603 PG/ML (ref 193–986)
WBC # BLD AUTO: 5 K/UL (ref 4.3–11.1)

## 2021-04-12 PROCEDURE — 36415 COLL VENOUS BLD VENIPUNCTURE: CPT

## 2021-04-12 PROCEDURE — 82306 VITAMIN D 25 HYDROXY: CPT

## 2021-04-12 PROCEDURE — 85576 BLOOD PLATELET AGGREGATION: CPT

## 2021-04-12 PROCEDURE — 85025 COMPLETE CBC W/AUTO DIFF WBC: CPT

## 2021-04-12 PROCEDURE — 82607 VITAMIN B-12: CPT

## 2021-04-12 PROCEDURE — 80053 COMPREHEN METABOLIC PANEL: CPT

## 2021-04-14 LAB
Lab: NORMAL
REFERENCE LAB,REFLB: NORMAL
TEST DESCRIPTION:,ATST: NORMAL

## 2021-05-10 ENCOUNTER — HOSPITAL ENCOUNTER (OUTPATIENT)
Dept: INFUSION THERAPY | Age: 31
Discharge: HOME | End: 2021-05-10
Payer: COMMERCIAL

## 2021-05-10 VITALS
HEART RATE: 64 BPM | DIASTOLIC BLOOD PRESSURE: 79 MMHG | RESPIRATION RATE: 18 BRPM | SYSTOLIC BLOOD PRESSURE: 138 MMHG | TEMPERATURE: 98 F

## 2021-05-10 DIAGNOSIS — Z17.0 MALIGNANT NEOPLASM OF UPPER-OUTER QUADRANT OF RIGHT BREAST IN FEMALE, ESTROGEN RECEPTOR POSITIVE (HCC): Primary | ICD-10-CM

## 2021-05-10 DIAGNOSIS — C50.411 MALIGNANT NEOPLASM OF UPPER-OUTER QUADRANT OF RIGHT BREAST IN FEMALE, ESTROGEN RECEPTOR POSITIVE (HCC): Primary | ICD-10-CM

## 2021-05-10 PROCEDURE — 96402 CHEMO HORMON ANTINEOPL SQ/IM: CPT

## 2021-05-10 PROCEDURE — 74011250636 HC RX REV CODE- 250/636: Performed by: INTERNAL MEDICINE

## 2021-05-10 RX ADMIN — GOSERELIN ACETATE 3.6 MG: 3.6 IMPLANT SUBCUTANEOUS at 15:58

## 2021-05-10 NOTE — PROGRESS NOTES
Arrived to the [facility]. Zoladex completed and tolerated well. Any issues or concerns during appointment: denies  Patient given education on injection   Instructed patient to notify provider for any issues or worrisome symptoms. They verbalized understanding. Patient aware of next infusion appointment scheduled for 5/10/21 at 330pm  Discharged ambulatory with self.

## 2021-06-07 ENCOUNTER — HOSPITAL ENCOUNTER (OUTPATIENT)
Dept: INFUSION THERAPY | Age: 31
Discharge: HOME | End: 2021-06-07
Payer: COMMERCIAL

## 2021-06-07 VITALS
HEART RATE: 67 BPM | DIASTOLIC BLOOD PRESSURE: 67 MMHG | TEMPERATURE: 98 F | SYSTOLIC BLOOD PRESSURE: 135 MMHG | RESPIRATION RATE: 17 BRPM

## 2021-06-07 DIAGNOSIS — C50.411 MALIGNANT NEOPLASM OF UPPER-OUTER QUADRANT OF RIGHT BREAST IN FEMALE, ESTROGEN RECEPTOR POSITIVE (HCC): Primary | ICD-10-CM

## 2021-06-07 DIAGNOSIS — Z17.0 MALIGNANT NEOPLASM OF UPPER-OUTER QUADRANT OF RIGHT BREAST IN FEMALE, ESTROGEN RECEPTOR POSITIVE (HCC): Primary | ICD-10-CM

## 2021-06-07 PROCEDURE — 74011250636 HC RX REV CODE- 250/636: Performed by: INTERNAL MEDICINE

## 2021-06-07 PROCEDURE — 96402 CHEMO HORMON ANTINEOPL SQ/IM: CPT

## 2021-06-07 RX ADMIN — GOSERELIN ACETATE 3.6 MG: 3.6 IMPLANT SUBCUTANEOUS at 15:50

## 2021-06-07 NOTE — PROGRESS NOTES
Arrived to the [facility]. Patient states there is no way she could be pregnant. Zoledex injection completed. Provided education on same    Patient instructed to report any side affects to ordering provider. Patient tolerated well. Any issues or concerns during appointment: none. Patient aware of next infusion appointment on 07/13/2021 (date) at [phone] (time). Discharged ambulatory.

## 2021-07-13 ENCOUNTER — HOSPITAL ENCOUNTER (OUTPATIENT)
Dept: LAB | Age: 31
Discharge: HOME | End: 2021-07-13
Payer: COMMERCIAL

## 2021-07-13 ENCOUNTER — HOSPITAL ENCOUNTER (OUTPATIENT)
Dept: INFUSION THERAPY | Age: 31
Discharge: HOME | End: 2021-07-13
Payer: COMMERCIAL

## 2021-07-13 DIAGNOSIS — Z17.0 MALIGNANT NEOPLASM OF UPPER-OUTER QUADRANT OF RIGHT BREAST IN FEMALE, ESTROGEN RECEPTOR POSITIVE (HCC): ICD-10-CM

## 2021-07-13 DIAGNOSIS — C50.411 MALIGNANT NEOPLASM OF UPPER-OUTER QUADRANT OF RIGHT BREAST IN FEMALE, ESTROGEN RECEPTOR POSITIVE (HCC): Primary | ICD-10-CM

## 2021-07-13 DIAGNOSIS — Z17.0 MALIGNANT NEOPLASM OF UPPER-OUTER QUADRANT OF RIGHT BREAST IN FEMALE, ESTROGEN RECEPTOR POSITIVE (HCC): Primary | ICD-10-CM

## 2021-07-13 DIAGNOSIS — C50.411 MALIGNANT NEOPLASM OF UPPER-OUTER QUADRANT OF RIGHT BREAST IN FEMALE, ESTROGEN RECEPTOR POSITIVE (HCC): ICD-10-CM

## 2021-07-13 LAB
ALBUMIN SERPL-MCNC: 4.3 G/DL (ref 3.5–5)
ALBUMIN/GLOB SERPL: 1.2 {RATIO} (ref 1.2–3.5)
ALP SERPL-CCNC: 101 U/L (ref 50–136)
ALT SERPL-CCNC: 22 U/L (ref 12–65)
ANION GAP SERPL CALC-SCNC: 5 MMOL/L (ref 7–16)
AST SERPL-CCNC: 8 U/L (ref 15–37)
BASOPHILS # BLD: 0 K/UL (ref 0–0.2)
BASOPHILS NFR BLD: 1 % (ref 0–2)
BILIRUB SERPL-MCNC: 0.3 MG/DL (ref 0.2–1.1)
BUN SERPL-MCNC: 14 MG/DL (ref 6–23)
CALCIUM SERPL-MCNC: 9.3 MG/DL (ref 8.3–10.4)
CHLORIDE SERPL-SCNC: 101 MMOL/L (ref 98–107)
CO2 SERPL-SCNC: 31 MMOL/L (ref 21–32)
CREAT SERPL-MCNC: 0.9 MG/DL (ref 0.6–1)
DIFFERENTIAL METHOD BLD: ABNORMAL
EOSINOPHIL # BLD: 0 K/UL (ref 0–0.8)
EOSINOPHIL NFR BLD: 1 % (ref 0.5–7.8)
ERYTHROCYTE [DISTWIDTH] IN BLOOD BY AUTOMATED COUNT: 12.2 % (ref 11.9–14.6)
GLOBULIN SER CALC-MCNC: 3.6 G/DL (ref 2.3–3.5)
GLUCOSE SERPL-MCNC: 98 MG/DL (ref 65–100)
HCT VFR BLD AUTO: 41.7 % (ref 35.8–46.3)
HGB BLD-MCNC: 13.9 G/DL (ref 11.7–15.4)
IMM GRANULOCYTES # BLD AUTO: 0 K/UL (ref 0–0.5)
IMM GRANULOCYTES NFR BLD AUTO: 0 % (ref 0–5)
LYMPHOCYTES # BLD: 1.3 K/UL (ref 0.5–4.6)
LYMPHOCYTES NFR BLD: 34 % (ref 13–44)
MCH RBC QN AUTO: 30.6 PG (ref 26.1–32.9)
MCHC RBC AUTO-ENTMCNC: 33.3 G/DL (ref 31.4–35)
MCV RBC AUTO: 91.9 FL (ref 79.6–97.8)
MONOCYTES # BLD: 0.4 K/UL (ref 0.1–1.3)
MONOCYTES NFR BLD: 10 % (ref 4–12)
NEUTS SEG # BLD: 2.1 K/UL (ref 1.7–8.2)
NEUTS SEG NFR BLD: 55 % (ref 43–78)
NRBC # BLD: 0 K/UL (ref 0–0.2)
PLATELET # BLD AUTO: 217 K/UL (ref 150–450)
PMV BLD AUTO: 10.5 FL (ref 9.4–12.3)
POTASSIUM SERPL-SCNC: 4.2 MMOL/L (ref 3.5–5.1)
PROT SERPL-MCNC: 7.9 G/DL (ref 6.3–8.2)
RBC # BLD AUTO: 4.54 M/UL (ref 4.05–5.2)
SODIUM SERPL-SCNC: 137 MMOL/L (ref 136–145)
WBC # BLD AUTO: 3.8 K/UL (ref 4.3–11.1)

## 2021-07-13 PROCEDURE — 80053 COMPREHEN METABOLIC PANEL: CPT

## 2021-07-13 PROCEDURE — 96402 CHEMO HORMON ANTINEOPL SQ/IM: CPT

## 2021-07-13 PROCEDURE — 85025 COMPLETE CBC W/AUTO DIFF WBC: CPT

## 2021-07-13 PROCEDURE — 36415 COLL VENOUS BLD VENIPUNCTURE: CPT

## 2021-07-13 PROCEDURE — 74011250636 HC RX REV CODE- 250/636: Performed by: NURSE PRACTITIONER

## 2021-07-13 RX ADMIN — GOSERELIN ACETATE 3.6 MG: 3.6 IMPLANT SUBCUTANEOUS at 15:50

## 2021-07-13 NOTE — PROGRESS NOTES
Arrived to the [facility]. Pt denies chance of pregnancy. Zoladex completed. Provided education on same. Patient instructed to report any side affects to ordering provider. Patient tolerated well. Any issues or concerns during appointment: none. Patient aware of next infusion appointment on 8/10  Discharged ambulatory.

## 2021-08-10 ENCOUNTER — HOSPITAL ENCOUNTER (OUTPATIENT)
Dept: INFUSION THERAPY | Age: 31
Discharge: HOME | End: 2021-08-10
Payer: COMMERCIAL

## 2021-08-10 VITALS
DIASTOLIC BLOOD PRESSURE: 76 MMHG | TEMPERATURE: 97.8 F | SYSTOLIC BLOOD PRESSURE: 137 MMHG | OXYGEN SATURATION: 100 % | RESPIRATION RATE: 16 BRPM | HEART RATE: 60 BPM

## 2021-08-10 DIAGNOSIS — C50.411 MALIGNANT NEOPLASM OF UPPER-OUTER QUADRANT OF RIGHT BREAST IN FEMALE, ESTROGEN RECEPTOR POSITIVE (HCC): Primary | ICD-10-CM

## 2021-08-10 DIAGNOSIS — Z17.0 MALIGNANT NEOPLASM OF UPPER-OUTER QUADRANT OF RIGHT BREAST IN FEMALE, ESTROGEN RECEPTOR POSITIVE (HCC): Primary | ICD-10-CM

## 2021-08-10 PROCEDURE — 96402 CHEMO HORMON ANTINEOPL SQ/IM: CPT

## 2021-08-10 PROCEDURE — 74011250636 HC RX REV CODE- 250/636: Performed by: NURSE PRACTITIONER

## 2021-08-10 RX ADMIN — GOSERELIN ACETATE 3.6 MG: 3.6 IMPLANT SUBCUTANEOUS at 10:42

## 2021-08-10 NOTE — PROGRESS NOTES
Arrived to the [facility]. Zoladex injection  completed. Provided education on side effects to report to MD.    Patient instructed to report any side affects to ordering provider. Patient tolerated well. Any issues or concerns during appointment: none. Patient aware of next infusion appointment on 09/07/21 (date) at 1400 (time). Discharged ambulatory.

## 2021-09-07 ENCOUNTER — HOSPITAL ENCOUNTER (OUTPATIENT)
Dept: INFUSION THERAPY | Age: 31
Discharge: HOME | End: 2021-09-07
Payer: COMMERCIAL

## 2021-09-07 ENCOUNTER — HOSPITAL ENCOUNTER (OUTPATIENT)
Dept: LAB | Age: 31
Discharge: HOME | End: 2021-09-07

## 2021-09-07 VITALS
HEART RATE: 66 BPM | SYSTOLIC BLOOD PRESSURE: 138 MMHG | OXYGEN SATURATION: 99 % | TEMPERATURE: 98 F | DIASTOLIC BLOOD PRESSURE: 74 MMHG | RESPIRATION RATE: 16 BRPM

## 2021-09-07 DIAGNOSIS — C50.411 MALIGNANT NEOPLASM OF UPPER-OUTER QUADRANT OF RIGHT BREAST IN FEMALE, ESTROGEN RECEPTOR POSITIVE (HCC): Primary | ICD-10-CM

## 2021-09-07 DIAGNOSIS — Z17.0 MALIGNANT NEOPLASM OF UPPER-OUTER QUADRANT OF RIGHT BREAST IN FEMALE, ESTROGEN RECEPTOR POSITIVE (HCC): Primary | ICD-10-CM

## 2021-09-07 LAB — HCG UR QL: NEGATIVE

## 2021-09-07 PROCEDURE — 81025 URINE PREGNANCY TEST: CPT

## 2021-09-07 PROCEDURE — 74011250636 HC RX REV CODE- 250/636: Performed by: NURSE PRACTITIONER

## 2021-09-07 PROCEDURE — 96402 CHEMO HORMON ANTINEOPL SQ/IM: CPT

## 2021-09-07 RX ADMIN — GOSERELIN ACETATE 3.6 MG: 3.6 IMPLANT SUBCUTANEOUS at 15:28

## 2021-09-07 NOTE — PROGRESS NOTES
Arrived to the [facility]. Zoladex injection completed. Provided education on Zoladex. Patient has had this medication before. Opportunity for questions provided. Patient instructed to report any side affects to ordering provider. Patient tolerated well. Any issues or concerns during appointment: no.  Patient aware of next lab/OV/infusion appointment on 10/11/2021 (date) at 1:50 pm (time). Discharged ambulatory with self.

## 2021-10-11 ENCOUNTER — HOSPITAL ENCOUNTER (OUTPATIENT)
Dept: INFUSION THERAPY | Age: 31
Discharge: HOME | End: 2021-10-11
Payer: COMMERCIAL

## 2021-10-11 ENCOUNTER — HOSPITAL ENCOUNTER (OUTPATIENT)
Dept: LAB | Age: 31
Discharge: HOME | End: 2021-10-11
Payer: COMMERCIAL

## 2021-10-11 DIAGNOSIS — Z17.0 MALIGNANT NEOPLASM OF UPPER-OUTER QUADRANT OF RIGHT BREAST IN FEMALE, ESTROGEN RECEPTOR POSITIVE (HCC): Primary | ICD-10-CM

## 2021-10-11 DIAGNOSIS — C50.411 MALIGNANT NEOPLASM OF UPPER-OUTER QUADRANT OF RIGHT BREAST IN FEMALE, ESTROGEN RECEPTOR POSITIVE (HCC): Primary | ICD-10-CM

## 2021-10-11 DIAGNOSIS — C50.411 MALIGNANT NEOPLASM OF UPPER-OUTER QUADRANT OF RIGHT BREAST IN FEMALE, ESTROGEN RECEPTOR POSITIVE (HCC): ICD-10-CM

## 2021-10-11 DIAGNOSIS — Z17.0 MALIGNANT NEOPLASM OF UPPER-OUTER QUADRANT OF RIGHT BREAST IN FEMALE, ESTROGEN RECEPTOR POSITIVE (HCC): ICD-10-CM

## 2021-10-11 LAB
ALBUMIN SERPL-MCNC: 4.5 G/DL (ref 3.5–5)
ALBUMIN/GLOB SERPL: 1.4 {RATIO} (ref 1.2–3.5)
ALP SERPL-CCNC: 91 U/L (ref 50–136)
ALT SERPL-CCNC: 20 U/L (ref 12–65)
ANION GAP SERPL CALC-SCNC: 5 MMOL/L (ref 7–16)
AST SERPL-CCNC: 14 U/L (ref 15–37)
BASOPHILS # BLD: 0 K/UL (ref 0–0.2)
BASOPHILS NFR BLD: 1 % (ref 0–2)
BILIRUB SERPL-MCNC: 0.4 MG/DL (ref 0.2–1.1)
BUN SERPL-MCNC: 14 MG/DL (ref 6–23)
CALCIUM SERPL-MCNC: 9.5 MG/DL (ref 8.3–10.4)
CHLORIDE SERPL-SCNC: 105 MMOL/L (ref 98–107)
CO2 SERPL-SCNC: 29 MMOL/L (ref 21–32)
CREAT SERPL-MCNC: 0.9 MG/DL (ref 0.6–1)
DIFFERENTIAL METHOD BLD: NORMAL
EOSINOPHIL # BLD: 0 K/UL (ref 0–0.8)
EOSINOPHIL NFR BLD: 1 % (ref 0.5–7.8)
ERYTHROCYTE [DISTWIDTH] IN BLOOD BY AUTOMATED COUNT: 12.1 % (ref 11.9–14.6)
GLOBULIN SER CALC-MCNC: 3.3 G/DL (ref 2.3–3.5)
GLUCOSE SERPL-MCNC: 87 MG/DL (ref 65–100)
HCG UR QL: NEGATIVE
HCT VFR BLD AUTO: 41.4 % (ref 35.8–46.3)
HGB BLD-MCNC: 14 G/DL (ref 11.7–15.4)
IMM GRANULOCYTES # BLD AUTO: 0 K/UL (ref 0–0.5)
IMM GRANULOCYTES NFR BLD AUTO: 0 % (ref 0–5)
LYMPHOCYTES # BLD: 1.3 K/UL (ref 0.5–4.6)
LYMPHOCYTES NFR BLD: 29 % (ref 13–44)
MCH RBC QN AUTO: 30.6 PG (ref 26.1–32.9)
MCHC RBC AUTO-ENTMCNC: 33.8 G/DL (ref 31.4–35)
MCV RBC AUTO: 90.6 FL (ref 79.6–97.8)
MONOCYTES # BLD: 0.4 K/UL (ref 0.1–1.3)
MONOCYTES NFR BLD: 9 % (ref 4–12)
NEUTS SEG # BLD: 2.7 K/UL (ref 1.7–8.2)
NEUTS SEG NFR BLD: 60 % (ref 43–78)
NRBC # BLD: 0 K/UL (ref 0–0.2)
PLATELET # BLD AUTO: 220 K/UL (ref 150–450)
PMV BLD AUTO: 10.5 FL (ref 9.4–12.3)
POTASSIUM SERPL-SCNC: 4.4 MMOL/L (ref 3.5–5.1)
PROT SERPL-MCNC: 7.8 G/DL (ref 6.3–8.2)
RBC # BLD AUTO: 4.57 M/UL (ref 4.05–5.2)
SODIUM SERPL-SCNC: 139 MMOL/L (ref 136–145)
WBC # BLD AUTO: 4.4 K/UL (ref 4.3–11.1)

## 2021-10-11 PROCEDURE — 74011250636 HC RX REV CODE- 250/636: Performed by: INTERNAL MEDICINE

## 2021-10-11 PROCEDURE — 96402 CHEMO HORMON ANTINEOPL SQ/IM: CPT

## 2021-10-11 PROCEDURE — 85025 COMPLETE CBC W/AUTO DIFF WBC: CPT

## 2021-10-11 PROCEDURE — 81025 URINE PREGNANCY TEST: CPT

## 2021-10-11 PROCEDURE — 36415 COLL VENOUS BLD VENIPUNCTURE: CPT

## 2021-10-11 PROCEDURE — 80053 COMPREHEN METABOLIC PANEL: CPT

## 2021-10-11 RX ADMIN — GOSERELIN ACETATE 3.6 MG: 3.6 IMPLANT SUBCUTANEOUS at 16:01

## 2021-10-11 NOTE — PROGRESS NOTES
Pt arrived ambulatory, zoladex given, pt tolerated well, discharged home ambulatory aware of next appointment 11/8 at

## 2021-10-19 PROBLEM — N64.59 ABNORMAL BREAST EXAM: Status: ACTIVE | Noted: 2021-10-19

## 2021-10-19 PROBLEM — Z90.13 HX OF BILATERAL MASTECTOMY: Status: ACTIVE | Noted: 2021-10-19

## 2021-10-20 ENCOUNTER — HOSPITAL ENCOUNTER (OUTPATIENT)
Dept: MAMMOGRAPHY | Age: 31
Discharge: HOME | End: 2021-10-20
Attending: INTERNAL MEDICINE
Payer: COMMERCIAL

## 2021-10-20 DIAGNOSIS — N64.59 ABNORMAL BREAST EXAM: ICD-10-CM

## 2021-10-20 DIAGNOSIS — Z90.13 HX OF BILATERAL MASTECTOMY: ICD-10-CM

## 2021-10-20 PROCEDURE — 76642 ULTRASOUND BREAST LIMITED: CPT

## 2021-11-08 ENCOUNTER — HOSPITAL ENCOUNTER (OUTPATIENT)
Dept: INFUSION THERAPY | Age: 31
Discharge: HOME | End: 2021-11-08
Payer: COMMERCIAL

## 2021-11-08 VITALS
TEMPERATURE: 98 F | DIASTOLIC BLOOD PRESSURE: 74 MMHG | RESPIRATION RATE: 18 BRPM | HEART RATE: 68 BPM | SYSTOLIC BLOOD PRESSURE: 152 MMHG | OXYGEN SATURATION: 98 %

## 2021-11-08 DIAGNOSIS — C50.411 MALIGNANT NEOPLASM OF UPPER-OUTER QUADRANT OF RIGHT BREAST IN FEMALE, ESTROGEN RECEPTOR POSITIVE (HCC): ICD-10-CM

## 2021-11-08 DIAGNOSIS — Z23 INFLUENZA VACCINE NEEDED: ICD-10-CM

## 2021-11-08 DIAGNOSIS — E53.8 LOW SERUM VITAMIN B12: Primary | ICD-10-CM

## 2021-11-08 DIAGNOSIS — Z17.0 MALIGNANT NEOPLASM OF UPPER-OUTER QUADRANT OF RIGHT BREAST IN FEMALE, ESTROGEN RECEPTOR POSITIVE (HCC): ICD-10-CM

## 2021-11-08 LAB — HCG UR QL: NEGATIVE

## 2021-11-08 PROCEDURE — 96402 CHEMO HORMON ANTINEOPL SQ/IM: CPT

## 2021-11-08 PROCEDURE — 81025 URINE PREGNANCY TEST: CPT

## 2021-11-08 PROCEDURE — 74011250636 HC RX REV CODE- 250/636: Performed by: INTERNAL MEDICINE

## 2021-11-08 RX ADMIN — GOSERELIN ACETATE 3.6 MG: 3.6 IMPLANT SUBCUTANEOUS at 16:03

## 2021-11-08 NOTE — PROGRESS NOTES
Arrived to the [facility]. Zoladex completed. Provided education on same. Patient instructed to report any side affects to ordering provider. Patient tolerated well. Any issues or concerns during appointment: none. Patient aware of next infusion appointment on 12/6  Discharged ambulatory.

## 2021-12-06 ENCOUNTER — HOSPITAL ENCOUNTER (OUTPATIENT)
Dept: LAB | Age: 31
Discharge: HOME | End: 2021-12-06

## 2021-12-06 ENCOUNTER — HOSPITAL ENCOUNTER (OUTPATIENT)
Dept: INFUSION THERAPY | Age: 31
Discharge: HOME | End: 2021-12-06
Payer: COMMERCIAL

## 2021-12-06 VITALS
HEART RATE: 74 BPM | SYSTOLIC BLOOD PRESSURE: 127 MMHG | OXYGEN SATURATION: 97 % | RESPIRATION RATE: 18 BRPM | TEMPERATURE: 98.2 F | DIASTOLIC BLOOD PRESSURE: 75 MMHG

## 2021-12-06 DIAGNOSIS — C50.411 MALIGNANT NEOPLASM OF UPPER-OUTER QUADRANT OF RIGHT BREAST IN FEMALE, ESTROGEN RECEPTOR POSITIVE (HCC): Primary | ICD-10-CM

## 2021-12-06 DIAGNOSIS — Z17.0 MALIGNANT NEOPLASM OF UPPER-OUTER QUADRANT OF RIGHT BREAST IN FEMALE, ESTROGEN RECEPTOR POSITIVE (HCC): Primary | ICD-10-CM

## 2021-12-06 LAB — HCG UR QL: NEGATIVE

## 2021-12-06 PROCEDURE — 81025 URINE PREGNANCY TEST: CPT

## 2021-12-06 PROCEDURE — 96402 CHEMO HORMON ANTINEOPL SQ/IM: CPT

## 2021-12-06 PROCEDURE — 74011250636 HC RX REV CODE- 250/636: Performed by: INTERNAL MEDICINE

## 2021-12-06 RX ADMIN — GOSERELIN ACETATE 3.6 MG: 3.6 IMPLANT SUBCUTANEOUS at 16:36

## 2021-12-06 NOTE — PROGRESS NOTES
Arrived to the [facility]. Zoladex completed. Provided education on same. Patient instructed to report any side affects to ordering provider. Patient tolerated well. Any issues or concerns during appointment: none. Patient aware of next infusion appointment on 1/3/22  Discharged ambulatory.

## 2021-12-14 ENCOUNTER — HOSPITAL ENCOUNTER (OUTPATIENT)
Dept: MAMMOGRAPHY | Age: 31
Discharge: HOME | End: 2021-12-14
Attending: INTERNAL MEDICINE
Payer: COMMERCIAL

## 2021-12-14 DIAGNOSIS — Z79.811 AROMATASE INHIBITOR USE: ICD-10-CM

## 2021-12-14 PROCEDURE — 77080 DXA BONE DENSITY AXIAL: CPT

## 2022-01-03 ENCOUNTER — HOSPITAL ENCOUNTER (OUTPATIENT)
Dept: INFUSION THERAPY | Age: 32
Discharge: HOME | End: 2022-01-03
Payer: COMMERCIAL

## 2022-01-03 ENCOUNTER — HOSPITAL ENCOUNTER (OUTPATIENT)
Dept: LAB | Age: 32
Discharge: HOME | End: 2022-01-03
Payer: COMMERCIAL

## 2022-01-03 DIAGNOSIS — C50.411 MALIGNANT NEOPLASM OF UPPER-OUTER QUADRANT OF RIGHT BREAST IN FEMALE, ESTROGEN RECEPTOR POSITIVE (HCC): ICD-10-CM

## 2022-01-03 DIAGNOSIS — Z17.0 MALIGNANT NEOPLASM OF UPPER-OUTER QUADRANT OF RIGHT BREAST IN FEMALE, ESTROGEN RECEPTOR POSITIVE (HCC): ICD-10-CM

## 2022-01-03 DIAGNOSIS — C50.411 MALIGNANT NEOPLASM OF UPPER-OUTER QUADRANT OF RIGHT BREAST IN FEMALE, ESTROGEN RECEPTOR POSITIVE (HCC): Primary | ICD-10-CM

## 2022-01-03 DIAGNOSIS — Z17.0 MALIGNANT NEOPLASM OF UPPER-OUTER QUADRANT OF RIGHT BREAST IN FEMALE, ESTROGEN RECEPTOR POSITIVE (HCC): Primary | ICD-10-CM

## 2022-01-03 LAB
ALBUMIN SERPL-MCNC: 4.3 G/DL (ref 3.5–5)
ALBUMIN/GLOB SERPL: 1.2 {RATIO} (ref 1.2–3.5)
ALP SERPL-CCNC: 91 U/L (ref 50–136)
ALT SERPL-CCNC: 17 U/L (ref 12–65)
ANION GAP SERPL CALC-SCNC: 6 MMOL/L (ref 7–16)
AST SERPL-CCNC: 12 U/L (ref 15–37)
BASOPHILS # BLD: 0 K/UL (ref 0–0.2)
BASOPHILS NFR BLD: 1 % (ref 0–2)
BILIRUB SERPL-MCNC: 0.3 MG/DL (ref 0.2–1.1)
BUN SERPL-MCNC: 16 MG/DL (ref 6–23)
CALCIUM SERPL-MCNC: 9.6 MG/DL (ref 8.3–10.4)
CHLORIDE SERPL-SCNC: 103 MMOL/L (ref 98–107)
CO2 SERPL-SCNC: 29 MMOL/L (ref 21–32)
CREAT SERPL-MCNC: 0.8 MG/DL (ref 0.6–1)
DIFFERENTIAL METHOD BLD: ABNORMAL
EOSINOPHIL # BLD: 0.2 K/UL (ref 0–0.8)
EOSINOPHIL NFR BLD: 4 % (ref 0.5–7.8)
ERYTHROCYTE [DISTWIDTH] IN BLOOD BY AUTOMATED COUNT: 11.8 % (ref 11.9–14.6)
GLOBULIN SER CALC-MCNC: 3.5 G/DL (ref 2.3–3.5)
GLUCOSE SERPL-MCNC: 105 MG/DL (ref 65–100)
HCG UR QL: NEGATIVE
HCT VFR BLD AUTO: 39.8 % (ref 35.8–46.3)
HGB BLD-MCNC: 13.6 G/DL (ref 11.7–15.4)
IMM GRANULOCYTES # BLD AUTO: 0 K/UL (ref 0–0.5)
IMM GRANULOCYTES NFR BLD AUTO: 0 % (ref 0–5)
LYMPHOCYTES # BLD: 1.5 K/UL (ref 0.5–4.6)
LYMPHOCYTES NFR BLD: 25 % (ref 13–44)
MCH RBC QN AUTO: 30.6 PG (ref 26.1–32.9)
MCHC RBC AUTO-ENTMCNC: 34.2 G/DL (ref 31.4–35)
MCV RBC AUTO: 89.6 FL (ref 79.6–97.8)
MONOCYTES # BLD: 0.4 K/UL (ref 0.1–1.3)
MONOCYTES NFR BLD: 6 % (ref 4–12)
NEUTS SEG # BLD: 3.7 K/UL (ref 1.7–8.2)
NEUTS SEG NFR BLD: 64 % (ref 43–78)
NRBC # BLD: 0 K/UL (ref 0–0.2)
PLATELET # BLD AUTO: 249 K/UL (ref 150–450)
PMV BLD AUTO: 10.6 FL (ref 9.4–12.3)
POTASSIUM SERPL-SCNC: 4.1 MMOL/L (ref 3.5–5.1)
PROT SERPL-MCNC: 7.8 G/DL (ref 6.3–8.2)
RBC # BLD AUTO: 4.44 M/UL (ref 4.05–5.2)
SODIUM SERPL-SCNC: 138 MMOL/L (ref 136–145)
WBC # BLD AUTO: 5.8 K/UL (ref 4.3–11.1)

## 2022-01-03 PROCEDURE — 80053 COMPREHEN METABOLIC PANEL: CPT

## 2022-01-03 PROCEDURE — 96402 CHEMO HORMON ANTINEOPL SQ/IM: CPT

## 2022-01-03 PROCEDURE — 74011250636 HC RX REV CODE- 250/636: Performed by: NURSE PRACTITIONER

## 2022-01-03 PROCEDURE — 81025 URINE PREGNANCY TEST: CPT

## 2022-01-03 PROCEDURE — 85025 COMPLETE CBC W/AUTO DIFF WBC: CPT

## 2022-01-03 PROCEDURE — 36415 COLL VENOUS BLD VENIPUNCTURE: CPT

## 2022-01-03 RX ADMIN — GOSERELIN ACETATE 3.6 MG: 3.6 IMPLANT SUBCUTANEOUS at 15:58

## 2022-01-31 ENCOUNTER — HOSPITAL ENCOUNTER (OUTPATIENT)
Dept: INFUSION THERAPY | Age: 32
Discharge: HOME | End: 2022-01-31
Payer: COMMERCIAL

## 2022-01-31 VITALS
DIASTOLIC BLOOD PRESSURE: 76 MMHG | OXYGEN SATURATION: 99 % | HEART RATE: 76 BPM | RESPIRATION RATE: 18 BRPM | TEMPERATURE: 97.9 F | SYSTOLIC BLOOD PRESSURE: 135 MMHG

## 2022-01-31 DIAGNOSIS — C50.411 MALIGNANT NEOPLASM OF UPPER-OUTER QUADRANT OF RIGHT BREAST IN FEMALE, ESTROGEN RECEPTOR POSITIVE (HCC): Primary | ICD-10-CM

## 2022-01-31 DIAGNOSIS — Z17.0 MALIGNANT NEOPLASM OF UPPER-OUTER QUADRANT OF RIGHT BREAST IN FEMALE, ESTROGEN RECEPTOR POSITIVE (HCC): Primary | ICD-10-CM

## 2022-01-31 LAB — HCG UR QL: NEGATIVE

## 2022-01-31 PROCEDURE — 96402 CHEMO HORMON ANTINEOPL SQ/IM: CPT

## 2022-01-31 PROCEDURE — 74011250636 HC RX REV CODE- 250/636: Performed by: NURSE PRACTITIONER

## 2022-01-31 PROCEDURE — 81025 URINE PREGNANCY TEST: CPT

## 2022-01-31 RX ADMIN — GOSERELIN ACETATE 3.6 MG: 3.6 IMPLANT SUBCUTANEOUS at 16:25

## 2022-01-31 NOTE — PROGRESS NOTES
Arrived to the [facility]. Zoladex completed after lab results obtained. Patient tolerated well. Any issues or concerns during appointment: none. Patient aware of next infusion appointment on 2/28/22 at 1530. Patient aware of next lab and [facility] office visit on 4/25/22 at 1500. Patient instructed to call provider with temperature of 100.4 or greater or nausea/vomiting/ diarrhea or pain not controlled by medications. Discharged amb.

## 2022-02-04 ENCOUNTER — HOSPITAL ENCOUNTER (OUTPATIENT)
Dept: MAMMOGRAPHY | Age: 32
Discharge: HOME | End: 2022-02-04
Attending: NURSE PRACTITIONER
Payer: COMMERCIAL

## 2022-02-04 DIAGNOSIS — Z90.13 HX OF BILATERAL MASTECTOMY: ICD-10-CM

## 2022-02-04 DIAGNOSIS — Z17.0 MALIGNANT NEOPLASM OF UPPER-OUTER QUADRANT OF RIGHT BREAST IN FEMALE, ESTROGEN RECEPTOR POSITIVE (HCC): ICD-10-CM

## 2022-02-04 DIAGNOSIS — M79.621 AXILLARY PAIN, RIGHT: ICD-10-CM

## 2022-02-04 DIAGNOSIS — C50.411 MALIGNANT NEOPLASM OF UPPER-OUTER QUADRANT OF RIGHT BREAST IN FEMALE, ESTROGEN RECEPTOR POSITIVE (HCC): ICD-10-CM

## 2022-02-04 PROCEDURE — 76882 US LMTD JT/FCL EVL NVASC XTR: CPT

## 2022-02-28 ENCOUNTER — HOSPITAL ENCOUNTER (OUTPATIENT)
Dept: LAB | Age: 32
Discharge: HOME | End: 2022-02-28

## 2022-02-28 ENCOUNTER — HOSPITAL ENCOUNTER (OUTPATIENT)
Dept: INFUSION THERAPY | Age: 32
Discharge: HOME | End: 2022-02-28
Payer: COMMERCIAL

## 2022-02-28 VITALS
RESPIRATION RATE: 18 BRPM | SYSTOLIC BLOOD PRESSURE: 136 MMHG | OXYGEN SATURATION: 97 % | TEMPERATURE: 98 F | DIASTOLIC BLOOD PRESSURE: 77 MMHG | HEART RATE: 78 BPM

## 2022-02-28 DIAGNOSIS — C50.411 MALIGNANT NEOPLASM OF UPPER-OUTER QUADRANT OF RIGHT BREAST IN FEMALE, ESTROGEN RECEPTOR POSITIVE (HCC): Primary | ICD-10-CM

## 2022-02-28 DIAGNOSIS — Z17.0 MALIGNANT NEOPLASM OF UPPER-OUTER QUADRANT OF RIGHT BREAST IN FEMALE, ESTROGEN RECEPTOR POSITIVE (HCC): Primary | ICD-10-CM

## 2022-02-28 LAB — HCG UR QL: NEGATIVE

## 2022-02-28 PROCEDURE — 96402 CHEMO HORMON ANTINEOPL SQ/IM: CPT

## 2022-02-28 PROCEDURE — 81025 URINE PREGNANCY TEST: CPT

## 2022-02-28 PROCEDURE — 74011250636 HC RX REV CODE- 250/636: Performed by: NURSE PRACTITIONER

## 2022-02-28 RX ADMIN — GOSERELIN ACETATE 3.6 MG: 3.6 IMPLANT SUBCUTANEOUS at 15:42

## 2022-02-28 NOTE — PROGRESS NOTES
Arrived to the [facility]. Zoladex completed. Provided education on Zoladex    Patient instructed to report any side affects to ordering provider. Patient tolerated Zoladex. Any issues or concerns during appointment: none. Patient aware of next infusion appointment on 3/28/22 (date) at 3 PM (time). Discharged ambulatory.

## 2022-03-18 PROBLEM — D05.11 INTRADUCTAL CARCINOMA IN SITU OF RIGHT BREAST: Status: ACTIVE | Noted: 2019-09-09

## 2022-03-18 PROBLEM — M25.50 JOINT PAIN: Status: ACTIVE | Noted: 2020-08-13

## 2022-03-19 PROBLEM — Z91.89 AT RISK FOR OSTEOPOROSIS: Status: ACTIVE | Noted: 2019-12-10

## 2022-03-19 PROBLEM — R23.2 HOT FLASHES RELATED TO AROMATASE INHIBITOR THERAPY: Status: ACTIVE | Noted: 2020-08-13

## 2022-03-19 PROBLEM — E53.8 LOW SERUM VITAMIN B12: Status: ACTIVE | Noted: 2021-02-12

## 2022-03-19 PROBLEM — N64.59 ABNORMAL BREAST EXAM: Status: ACTIVE | Noted: 2021-10-19

## 2022-03-19 PROBLEM — R23.3 EASY BRUISING: Status: ACTIVE | Noted: 2021-02-09

## 2022-03-19 PROBLEM — Z79.811 AROMATASE INHIBITOR USE: Status: ACTIVE | Noted: 2019-12-10

## 2022-03-19 PROBLEM — Z90.13 HX OF BILATERAL MASTECTOMY: Status: ACTIVE | Noted: 2021-10-19

## 2022-03-19 PROBLEM — R92.8 ABNORMAL MRI, BREAST: Status: ACTIVE | Noted: 2021-02-09

## 2022-03-19 PROBLEM — T45.1X5A HOT FLASHES RELATED TO AROMATASE INHIBITOR THERAPY: Status: ACTIVE | Noted: 2020-08-13

## 2022-03-19 PROBLEM — Z23 INFLUENZA VACCINE NEEDED: Status: ACTIVE | Noted: 2020-10-19

## 2022-03-20 PROBLEM — Z17.0 MALIGNANT NEOPLASM OF UPPER-OUTER QUADRANT OF RIGHT BREAST IN FEMALE, ESTROGEN RECEPTOR POSITIVE (HCC): Status: ACTIVE | Noted: 2019-02-04

## 2022-03-20 PROBLEM — C77.3 SECONDARY AND UNSPECIFIED MALIGNANT NEOPLASM OF AXILLA AND UPPER LIMB LYMPH NODES (HCC): Status: ACTIVE | Noted: 2019-09-09

## 2022-03-20 PROBLEM — C50.411 MALIGNANT NEOPLASM OF UPPER-OUTER QUADRANT OF RIGHT BREAST IN FEMALE, ESTROGEN RECEPTOR POSITIVE (HCC): Status: ACTIVE | Noted: 2019-02-04

## 2022-03-28 ENCOUNTER — HOSPITAL ENCOUNTER (OUTPATIENT)
Dept: LAB | Age: 32
Discharge: HOME | End: 2022-03-28

## 2022-03-28 ENCOUNTER — HOSPITAL ENCOUNTER (OUTPATIENT)
Dept: INFUSION THERAPY | Age: 32
Discharge: HOME | End: 2022-03-28
Payer: COMMERCIAL

## 2022-03-28 VITALS
OXYGEN SATURATION: 99 % | SYSTOLIC BLOOD PRESSURE: 131 MMHG | WEIGHT: 153.2 LBS | HEART RATE: 63 BPM | RESPIRATION RATE: 18 BRPM | DIASTOLIC BLOOD PRESSURE: 72 MMHG | BODY MASS INDEX: 25.49 KG/M2 | TEMPERATURE: 98.3 F

## 2022-03-28 DIAGNOSIS — C50.411 MALIGNANT NEOPLASM OF UPPER-OUTER QUADRANT OF RIGHT BREAST IN FEMALE, ESTROGEN RECEPTOR POSITIVE (HCC): Primary | ICD-10-CM

## 2022-03-28 DIAGNOSIS — Z17.0 MALIGNANT NEOPLASM OF UPPER-OUTER QUADRANT OF RIGHT BREAST IN FEMALE, ESTROGEN RECEPTOR POSITIVE (HCC): Primary | ICD-10-CM

## 2022-03-28 LAB — HCG UR QL: NEGATIVE

## 2022-03-28 PROCEDURE — 81025 URINE PREGNANCY TEST: CPT

## 2022-03-28 PROCEDURE — 96402 CHEMO HORMON ANTINEOPL SQ/IM: CPT

## 2022-03-28 PROCEDURE — 74011250636 HC RX REV CODE- 250/636: Performed by: NURSE PRACTITIONER

## 2022-03-28 RX ADMIN — GOSERELIN ACETATE 3.6 MG: 3.6 IMPLANT SUBCUTANEOUS at 16:08

## 2022-03-28 NOTE — PROGRESS NOTES
Arrived to the [facility]. Zoladex injection completed. Provided education on injection site side effects. Patient instructed to report any side affects to ordering provider. Patient tolerated well. Any issues or concerns during appointment: none. Patient aware of next infusion appointment on 4/25 at 4:30pm.  Discharged ambulatory to home.

## 2022-04-11 ENCOUNTER — HOSPITAL ENCOUNTER (OUTPATIENT)
Dept: MRI IMAGING | Age: 32
Discharge: HOME | End: 2022-04-11
Attending: INTERNAL MEDICINE
Payer: COMMERCIAL

## 2022-04-11 DIAGNOSIS — C50.411 MALIGNANT NEOPLASM OF UPPER-OUTER QUADRANT OF RIGHT BREAST IN FEMALE, ESTROGEN RECEPTOR POSITIVE (HCC): ICD-10-CM

## 2022-04-11 DIAGNOSIS — Z17.0 MALIGNANT NEOPLASM OF UPPER-OUTER QUADRANT OF RIGHT BREAST IN FEMALE, ESTROGEN RECEPTOR POSITIVE (HCC): ICD-10-CM

## 2022-04-11 PROCEDURE — 74011250636 HC RX REV CODE- 250/636: Performed by: INTERNAL MEDICINE

## 2022-04-11 PROCEDURE — 77049 MRI BREAST C-+ W/CAD BI: CPT

## 2022-04-11 PROCEDURE — A9576 INJ PROHANCE MULTIPACK: HCPCS | Performed by: INTERNAL MEDICINE

## 2022-04-11 RX ORDER — SODIUM CHLORIDE 0.9 % (FLUSH) 0.9 %
10 SYRINGE (ML) INJECTION
Status: COMPLETED | OUTPATIENT
Start: 2022-04-11 | End: 2022-04-11

## 2022-04-11 RX ADMIN — Medication 10 ML: at 09:12

## 2022-04-11 RX ADMIN — GADOTERIDOL 14 ML: 279.3 INJECTION, SOLUTION INTRAVENOUS at 09:12

## 2022-04-25 ENCOUNTER — HOSPITAL ENCOUNTER (OUTPATIENT)
Dept: INFUSION THERAPY | Age: 32
Discharge: HOME | End: 2022-04-25
Payer: COMMERCIAL

## 2022-04-25 ENCOUNTER — HOSPITAL ENCOUNTER (OUTPATIENT)
Dept: LAB | Age: 32
Discharge: HOME | End: 2022-04-25
Payer: COMMERCIAL

## 2022-04-25 DIAGNOSIS — Z17.0 MALIGNANT NEOPLASM OF UPPER-OUTER QUADRANT OF RIGHT BREAST IN FEMALE, ESTROGEN RECEPTOR POSITIVE (HCC): ICD-10-CM

## 2022-04-25 DIAGNOSIS — Z90.13 HX OF BILATERAL MASTECTOMY: ICD-10-CM

## 2022-04-25 DIAGNOSIS — D05.11 INTRADUCTAL CARCINOMA IN SITU OF RIGHT BREAST: Primary | ICD-10-CM

## 2022-04-25 DIAGNOSIS — C50.411 MALIGNANT NEOPLASM OF UPPER-OUTER QUADRANT OF RIGHT BREAST IN FEMALE, ESTROGEN RECEPTOR POSITIVE (HCC): Primary | ICD-10-CM

## 2022-04-25 DIAGNOSIS — Z17.0 MALIGNANT NEOPLASM OF UPPER-OUTER QUADRANT OF RIGHT BREAST IN FEMALE, ESTROGEN RECEPTOR POSITIVE (HCC): Primary | ICD-10-CM

## 2022-04-25 DIAGNOSIS — Z91.89 AT RISK FOR OSTEOPOROSIS: ICD-10-CM

## 2022-04-25 DIAGNOSIS — C50.411 MALIGNANT NEOPLASM OF UPPER-OUTER QUADRANT OF RIGHT BREAST IN FEMALE, ESTROGEN RECEPTOR POSITIVE (HCC): ICD-10-CM

## 2022-04-25 DIAGNOSIS — Z79.811 AROMATASE INHIBITOR USE: ICD-10-CM

## 2022-04-25 LAB
ALBUMIN SERPL-MCNC: 4.1 G/DL (ref 3.5–5)
ALBUMIN/GLOB SERPL: 1.3 {RATIO} (ref 1.2–3.5)
ALP SERPL-CCNC: 79 U/L (ref 50–136)
ALT SERPL-CCNC: 20 U/L (ref 12–65)
ANION GAP SERPL CALC-SCNC: 5 MMOL/L (ref 7–16)
AST SERPL-CCNC: 11 U/L (ref 15–37)
BASOPHILS # BLD: 0 K/UL (ref 0–0.2)
BASOPHILS NFR BLD: 1 % (ref 0–2)
BILIRUB SERPL-MCNC: 0.3 MG/DL (ref 0.2–1.1)
BUN SERPL-MCNC: 10 MG/DL (ref 6–23)
CALCIUM SERPL-MCNC: 8.9 MG/DL (ref 8.3–10.4)
CHLORIDE SERPL-SCNC: 106 MMOL/L (ref 98–107)
CO2 SERPL-SCNC: 28 MMOL/L (ref 21–32)
CREAT SERPL-MCNC: 0.7 MG/DL (ref 0.6–1)
DIFFERENTIAL METHOD BLD: NORMAL
EOSINOPHIL # BLD: 0.3 K/UL (ref 0–0.8)
EOSINOPHIL NFR BLD: 6 % (ref 0.5–7.8)
ERYTHROCYTE [DISTWIDTH] IN BLOOD BY AUTOMATED COUNT: 12 % (ref 11.9–14.6)
GLOBULIN SER CALC-MCNC: 3.2 G/DL (ref 2.3–3.5)
GLUCOSE SERPL-MCNC: 97 MG/DL (ref 65–100)
HCG UR QL: NEGATIVE
HCT VFR BLD AUTO: 39.2 % (ref 35.8–46.3)
HGB BLD-MCNC: 13.2 G/DL (ref 11.7–15.4)
IMM GRANULOCYTES # BLD AUTO: 0 K/UL (ref 0–0.5)
IMM GRANULOCYTES NFR BLD AUTO: 0 % (ref 0–5)
LYMPHOCYTES # BLD: 1.7 K/UL (ref 0.5–4.6)
LYMPHOCYTES NFR BLD: 32 % (ref 13–44)
MCH RBC QN AUTO: 31 PG (ref 26.1–32.9)
MCHC RBC AUTO-ENTMCNC: 33.7 G/DL (ref 31.4–35)
MCV RBC AUTO: 92 FL (ref 79.6–97.8)
MONOCYTES # BLD: 0.4 K/UL (ref 0.1–1.3)
MONOCYTES NFR BLD: 7 % (ref 4–12)
NEUTS SEG # BLD: 2.8 K/UL (ref 1.7–8.2)
NEUTS SEG NFR BLD: 54 % (ref 43–78)
NRBC # BLD: 0 K/UL (ref 0–0.2)
PLATELET # BLD AUTO: 222 K/UL (ref 150–450)
PMV BLD AUTO: 10.7 FL (ref 9.4–12.3)
POTASSIUM SERPL-SCNC: 3.8 MMOL/L (ref 3.5–5.1)
PROT SERPL-MCNC: 7.3 G/DL (ref 6.3–8.2)
RBC # BLD AUTO: 4.26 M/UL (ref 4.05–5.2)
SODIUM SERPL-SCNC: 139 MMOL/L (ref 136–145)
WBC # BLD AUTO: 5.2 K/UL (ref 4.3–11.1)

## 2022-04-25 PROCEDURE — 36415 COLL VENOUS BLD VENIPUNCTURE: CPT

## 2022-04-25 PROCEDURE — 80053 COMPREHEN METABOLIC PANEL: CPT

## 2022-04-25 PROCEDURE — 85025 COMPLETE CBC W/AUTO DIFF WBC: CPT

## 2022-04-25 PROCEDURE — 82306 VITAMIN D 25 HYDROXY: CPT

## 2022-04-25 PROCEDURE — 96402 CHEMO HORMON ANTINEOPL SQ/IM: CPT

## 2022-04-25 PROCEDURE — 81025 URINE PREGNANCY TEST: CPT

## 2022-04-25 PROCEDURE — 74011250636 HC RX REV CODE- 250/636: Performed by: INTERNAL MEDICINE

## 2022-04-25 RX ADMIN — GOSERELIN ACETATE 3.6 MG: 3.6 IMPLANT SUBCUTANEOUS at 17:05

## 2022-04-25 NOTE — PROGRESS NOTES
Arrived to the [facility]. Zoladex given and tolerated. No new issues or concerns voiced during  the visit. Aware of her next appointment on 5/23 at 1330. Discharged ambulatory in satisfactory condition. Meryle Sandman

## 2022-04-26 LAB — 25(OH)D3 SERPL-MCNC: 25.6 NG/ML (ref 30–100)

## 2022-05-05 RX ORDER — DIPHENHYDRAMINE HYDROCHLORIDE 50 MG/ML
50 INJECTION INTRAMUSCULAR; INTRAVENOUS
Status: CANCELLED | OUTPATIENT
Start: 2022-05-23

## 2022-05-05 RX ORDER — ALBUTEROL SULFATE 90 UG/1
4 AEROSOL, METERED RESPIRATORY (INHALATION) PRN
Status: CANCELLED | OUTPATIENT
Start: 2022-05-23

## 2022-05-05 RX ORDER — ACETAMINOPHEN 325 MG/1
650 TABLET ORAL
Status: CANCELLED | OUTPATIENT
Start: 2022-05-23

## 2022-05-05 RX ORDER — ONDANSETRON 2 MG/ML
8 INJECTION INTRAMUSCULAR; INTRAVENOUS
Status: CANCELLED | OUTPATIENT
Start: 2022-05-23

## 2022-05-05 RX ORDER — SODIUM CHLORIDE 9 MG/ML
INJECTION, SOLUTION INTRAVENOUS CONTINUOUS
Status: CANCELLED | OUTPATIENT
Start: 2022-05-23

## 2022-05-05 RX ORDER — EPINEPHRINE 1 MG/ML
0.3 INJECTION, SOLUTION, CONCENTRATE INTRAVENOUS PRN
Status: CANCELLED | OUTPATIENT
Start: 2022-05-23

## 2022-05-23 ENCOUNTER — HOSPITAL ENCOUNTER (OUTPATIENT)
Dept: INFUSION THERAPY | Age: 32
Discharge: HOME | End: 2022-05-23
Payer: COMMERCIAL

## 2022-05-23 VITALS
SYSTOLIC BLOOD PRESSURE: 137 MMHG | TEMPERATURE: 97.8 F | RESPIRATION RATE: 16 BRPM | HEART RATE: 72 BPM | BODY MASS INDEX: 25.83 KG/M2 | WEIGHT: 155.2 LBS | DIASTOLIC BLOOD PRESSURE: 72 MMHG

## 2022-05-23 DIAGNOSIS — C50.411 MALIGNANT NEOPLASM OF UPPER-OUTER QUADRANT OF RIGHT BREAST IN FEMALE, ESTROGEN RECEPTOR POSITIVE (HCC): Primary | ICD-10-CM

## 2022-05-23 DIAGNOSIS — Z17.0 MALIGNANT NEOPLASM OF UPPER-OUTER QUADRANT OF RIGHT BREAST IN FEMALE, ESTROGEN RECEPTOR POSITIVE (HCC): Primary | ICD-10-CM

## 2022-05-23 PROCEDURE — 6360000002 HC RX W HCPCS: Performed by: INTERNAL MEDICINE

## 2022-05-23 PROCEDURE — 96402 CHEMO HORMON ANTINEOPL SQ/IM: CPT

## 2022-05-23 RX ORDER — ALBUTEROL SULFATE 90 UG/1
4 AEROSOL, METERED RESPIRATORY (INHALATION) PRN
Status: CANCELLED | OUTPATIENT
Start: 2022-06-20

## 2022-05-23 RX ORDER — EPINEPHRINE 1 MG/ML
0.3 INJECTION, SOLUTION, CONCENTRATE INTRAVENOUS PRN
Status: CANCELLED | OUTPATIENT
Start: 2022-06-20

## 2022-05-23 RX ORDER — SODIUM CHLORIDE 9 MG/ML
INJECTION, SOLUTION INTRAVENOUS CONTINUOUS
Status: CANCELLED | OUTPATIENT
Start: 2022-06-20

## 2022-05-23 RX ORDER — DIPHENHYDRAMINE HYDROCHLORIDE 50 MG/ML
50 INJECTION INTRAMUSCULAR; INTRAVENOUS
Status: CANCELLED | OUTPATIENT
Start: 2022-06-20

## 2022-05-23 RX ORDER — ACETAMINOPHEN 325 MG/1
650 TABLET ORAL
Status: CANCELLED | OUTPATIENT
Start: 2022-06-20

## 2022-05-23 RX ORDER — ONDANSETRON 2 MG/ML
8 INJECTION INTRAMUSCULAR; INTRAVENOUS
Status: CANCELLED | OUTPATIENT
Start: 2022-06-20

## 2022-05-23 RX ADMIN — GOSERELIN ACETATE 3.6 MG: 3.6 IMPLANT SUBCUTANEOUS at 14:23

## 2022-05-23 NOTE — PROGRESS NOTES
Arrived to the [facility]. Zoladex injection completed. Provided education on injection site reactions. Patient instructed to report any side affects to ordering provider. Patient tolerated well. Any issues or concerns during appointment: none. Patient aware of next infusion appointment on 6/20 at 1:30pm.  Discharged ambulatory to home.

## 2022-06-20 ENCOUNTER — HOSPITAL ENCOUNTER (OUTPATIENT)
Dept: INFUSION THERAPY | Age: 32
Discharge: HOME | End: 2022-06-20
Payer: COMMERCIAL

## 2022-06-20 ENCOUNTER — HOSPITAL ENCOUNTER (OUTPATIENT)
Dept: LAB | Age: 32
Discharge: HOME | End: 2022-06-23

## 2022-06-20 ENCOUNTER — HOSPITAL ENCOUNTER (OUTPATIENT)
Dept: CT IMAGING | Age: 32
Discharge: HOME | End: 2022-06-23
Payer: COMMERCIAL

## 2022-06-20 VITALS
DIASTOLIC BLOOD PRESSURE: 60 MMHG | SYSTOLIC BLOOD PRESSURE: 126 MMHG | HEART RATE: 72 BPM | RESPIRATION RATE: 16 BRPM | TEMPERATURE: 98.1 F | OXYGEN SATURATION: 99 %

## 2022-06-20 DIAGNOSIS — C50.311 MALIGNANT NEOPLASM OF LOWER-INNER QUADRANT OF RIGHT BREAST OF FEMALE, ESTROGEN RECEPTOR POSITIVE (HCC): ICD-10-CM

## 2022-06-20 DIAGNOSIS — C50.411 MALIGNANT NEOPLASM OF UPPER-OUTER QUADRANT OF RIGHT FEMALE BREAST, UNSPECIFIED ESTROGEN RECEPTOR STATUS (HCC): ICD-10-CM

## 2022-06-20 DIAGNOSIS — Z17.0 MALIGNANT NEOPLASM OF UPPER-OUTER QUADRANT OF RIGHT BREAST IN FEMALE, ESTROGEN RECEPTOR POSITIVE (HCC): Primary | ICD-10-CM

## 2022-06-20 DIAGNOSIS — C50.411 MALIGNANT NEOPLASM OF UPPER-OUTER QUADRANT OF RIGHT BREAST IN FEMALE, ESTROGEN RECEPTOR POSITIVE (HCC): Primary | ICD-10-CM

## 2022-06-20 DIAGNOSIS — Z17.0 MALIGNANT NEOPLASM OF LOWER-INNER QUADRANT OF RIGHT BREAST OF FEMALE, ESTROGEN RECEPTOR POSITIVE (HCC): ICD-10-CM

## 2022-06-20 LAB — HCG UR QL: NEGATIVE

## 2022-06-20 PROCEDURE — 6360000002 HC RX W HCPCS: Performed by: INTERNAL MEDICINE

## 2022-06-20 PROCEDURE — 81025 URINE PREGNANCY TEST: CPT

## 2022-06-20 PROCEDURE — 6360000004 HC RX CONTRAST MEDICATION: Performed by: INTERNAL MEDICINE

## 2022-06-20 PROCEDURE — 96402 CHEMO HORMON ANTINEOPL SQ/IM: CPT

## 2022-06-20 PROCEDURE — 71260 CT THORAX DX C+: CPT

## 2022-06-20 RX ORDER — ONDANSETRON 2 MG/ML
8 INJECTION INTRAMUSCULAR; INTRAVENOUS
Status: CANCELLED | OUTPATIENT
Start: 2022-07-18

## 2022-06-20 RX ORDER — EPINEPHRINE 1 MG/ML
0.3 INJECTION, SOLUTION, CONCENTRATE INTRAVENOUS PRN
Status: CANCELLED | OUTPATIENT
Start: 2022-07-18

## 2022-06-20 RX ORDER — ACETAMINOPHEN 325 MG/1
650 TABLET ORAL
Status: CANCELLED | OUTPATIENT
Start: 2022-07-18

## 2022-06-20 RX ORDER — ALBUTEROL SULFATE 90 UG/1
4 AEROSOL, METERED RESPIRATORY (INHALATION) PRN
Status: CANCELLED | OUTPATIENT
Start: 2022-07-18

## 2022-06-20 RX ORDER — SODIUM CHLORIDE 0.9 % (FLUSH) 0.9 %
5-40 SYRINGE (ML) INJECTION PRN
Status: ACTIVE | OUTPATIENT
Start: 2022-06-20 | End: 2022-06-21

## 2022-06-20 RX ORDER — SODIUM CHLORIDE 9 MG/ML
INJECTION, SOLUTION INTRAVENOUS CONTINUOUS
Status: CANCELLED | OUTPATIENT
Start: 2022-07-18

## 2022-06-20 RX ORDER — DIPHENHYDRAMINE HYDROCHLORIDE 50 MG/ML
50 INJECTION INTRAMUSCULAR; INTRAVENOUS
Status: CANCELLED | OUTPATIENT
Start: 2022-07-18

## 2022-06-20 RX ADMIN — GOSERELIN ACETATE 3.6 MG: 3.6 IMPLANT SUBCUTANEOUS at 15:12

## 2022-06-20 RX ADMIN — IOPAMIDOL 70 ML: 755 INJECTION, SOLUTION INTRAVENOUS at 08:11

## 2022-06-20 NOTE — PROGRESS NOTES
[facility] Z[facility]adex completed. Provided education on Zoladex    Patient instructed to report any side affects to ordering provider. Patient tolerated Zoladex. Any issues or concerns during appointment: none  . Patient aware of next infusion appointment on 7/18/22 (date) at 2 27 PM (time). Discharged ambulatory.

## 2022-07-18 ENCOUNTER — HOSPITAL ENCOUNTER (OUTPATIENT)
Dept: LAB | Age: 32
Discharge: HOME | End: 2022-07-21
Payer: COMMERCIAL

## 2022-07-18 ENCOUNTER — OFFICE VISIT (OUTPATIENT)
Dept: ONCOLOGY | Age: 32
End: 2022-07-18
Payer: COMMERCIAL

## 2022-07-18 ENCOUNTER — HOSPITAL ENCOUNTER (OUTPATIENT)
Dept: INFUSION THERAPY | Age: 32
Discharge: HOME | End: 2022-07-18
Payer: COMMERCIAL

## 2022-07-18 VITALS
HEART RATE: 79 BPM | BODY MASS INDEX: 25.33 KG/M2 | RESPIRATION RATE: 14 BRPM | DIASTOLIC BLOOD PRESSURE: 84 MMHG | SYSTOLIC BLOOD PRESSURE: 122 MMHG | HEIGHT: 65 IN | WEIGHT: 152 LBS | OXYGEN SATURATION: 99 % | TEMPERATURE: 98 F

## 2022-07-18 DIAGNOSIS — D05.11 INTRADUCTAL CARCINOMA IN SITU OF RIGHT BREAST: ICD-10-CM

## 2022-07-18 DIAGNOSIS — C50.411 MALIGNANT NEOPLASM OF UPPER-OUTER QUADRANT OF RIGHT BREAST IN FEMALE, ESTROGEN RECEPTOR POSITIVE (HCC): Primary | ICD-10-CM

## 2022-07-18 DIAGNOSIS — Z17.0 MALIGNANT NEOPLASM OF UPPER-OUTER QUADRANT OF RIGHT BREAST IN FEMALE, ESTROGEN RECEPTOR POSITIVE (HCC): ICD-10-CM

## 2022-07-18 DIAGNOSIS — Z17.0 MALIGNANT NEOPLASM OF UPPER-OUTER QUADRANT OF RIGHT BREAST IN FEMALE, ESTROGEN RECEPTOR POSITIVE (HCC): Primary | ICD-10-CM

## 2022-07-18 DIAGNOSIS — C50.411 MALIGNANT NEOPLASM OF UPPER-OUTER QUADRANT OF RIGHT BREAST IN FEMALE, ESTROGEN RECEPTOR POSITIVE (HCC): ICD-10-CM

## 2022-07-18 DIAGNOSIS — Z90.13 HX OF BILATERAL MASTECTOMY: ICD-10-CM

## 2022-07-18 DIAGNOSIS — D05.11 INTRADUCTAL CARCINOMA IN SITU OF RIGHT BREAST: Primary | ICD-10-CM

## 2022-07-18 LAB
ALBUMIN SERPL-MCNC: 4.3 G/DL (ref 3.5–5)
ALBUMIN/GLOB SERPL: 1.3 {RATIO} (ref 1.2–3.5)
ALP SERPL-CCNC: 84 U/L (ref 50–136)
ALT SERPL-CCNC: 18 U/L (ref 12–65)
ANION GAP SERPL CALC-SCNC: 7 MMOL/L (ref 7–16)
AST SERPL-CCNC: 13 U/L (ref 15–37)
BASOPHILS # BLD: 0 K/UL (ref 0–0.2)
BASOPHILS NFR BLD: 1 % (ref 0–2)
BILIRUB SERPL-MCNC: 0.3 MG/DL (ref 0.2–1.1)
BUN SERPL-MCNC: 11 MG/DL (ref 6–23)
CALCIUM SERPL-MCNC: 9.5 MG/DL (ref 8.3–10.4)
CHLORIDE SERPL-SCNC: 106 MMOL/L (ref 98–107)
CO2 SERPL-SCNC: 27 MMOL/L (ref 21–32)
CREAT SERPL-MCNC: 0.9 MG/DL (ref 0.6–1)
DIFFERENTIAL METHOD BLD: NORMAL
EOSINOPHIL # BLD: 0.3 K/UL (ref 0–0.8)
EOSINOPHIL NFR BLD: 6 % (ref 0.5–7.8)
ERYTHROCYTE [DISTWIDTH] IN BLOOD BY AUTOMATED COUNT: 11.9 % (ref 11.9–14.6)
GLOBULIN SER CALC-MCNC: 3.4 G/DL (ref 2.3–3.5)
GLUCOSE SERPL-MCNC: 91 MG/DL (ref 65–100)
HCT VFR BLD AUTO: 42.7 % (ref 35.8–46.3)
HGB BLD-MCNC: 14.5 G/DL (ref 11.7–15.4)
IMM GRANULOCYTES # BLD AUTO: 0 K/UL (ref 0–0.5)
IMM GRANULOCYTES NFR BLD AUTO: 0 % (ref 0–5)
LYMPHOCYTES # BLD: 1.6 K/UL (ref 0.5–4.6)
LYMPHOCYTES NFR BLD: 33 % (ref 13–44)
MCH RBC QN AUTO: 30.6 PG (ref 26.1–32.9)
MCHC RBC AUTO-ENTMCNC: 34 G/DL (ref 31.4–35)
MCV RBC AUTO: 90.1 FL (ref 79.6–97.8)
MONOCYTES # BLD: 0.3 K/UL (ref 0.1–1.3)
MONOCYTES NFR BLD: 7 % (ref 4–12)
NEUTS SEG # BLD: 2.6 K/UL (ref 1.7–8.2)
NEUTS SEG NFR BLD: 54 % (ref 43–78)
NRBC # BLD: 0 K/UL (ref 0–0.2)
PLATELET # BLD AUTO: 248 K/UL (ref 150–450)
PMV BLD AUTO: 10.9 FL (ref 9.4–12.3)
POTASSIUM SERPL-SCNC: 4 MMOL/L (ref 3.5–5.1)
PROT SERPL-MCNC: 7.7 G/DL (ref 6.3–8.2)
RBC # BLD AUTO: 4.74 M/UL (ref 4.05–5.2)
SODIUM SERPL-SCNC: 140 MMOL/L (ref 136–145)
WBC # BLD AUTO: 4.9 K/UL (ref 4.3–11.1)

## 2022-07-18 PROCEDURE — 85025 COMPLETE CBC W/AUTO DIFF WBC: CPT

## 2022-07-18 PROCEDURE — 99214 OFFICE O/P EST MOD 30 MIN: CPT | Performed by: NURSE PRACTITIONER

## 2022-07-18 PROCEDURE — 96402 CHEMO HORMON ANTINEOPL SQ/IM: CPT

## 2022-07-18 PROCEDURE — 80053 COMPREHEN METABOLIC PANEL: CPT

## 2022-07-18 PROCEDURE — 36415 COLL VENOUS BLD VENIPUNCTURE: CPT

## 2022-07-18 PROCEDURE — 6360000002 HC RX W HCPCS: Performed by: INTERNAL MEDICINE

## 2022-07-18 RX ORDER — ONDANSETRON 2 MG/ML
8 INJECTION INTRAMUSCULAR; INTRAVENOUS
Status: CANCELLED | OUTPATIENT
Start: 2022-08-15

## 2022-07-18 RX ORDER — MULTIVIT-MIN/IRON/FOLIC ACID/K 18-600-40
CAPSULE ORAL
COMMUNITY

## 2022-07-18 RX ORDER — ALBUTEROL SULFATE 90 UG/1
4 AEROSOL, METERED RESPIRATORY (INHALATION) PRN
Status: CANCELLED | OUTPATIENT
Start: 2022-08-15

## 2022-07-18 RX ORDER — HYDROXYZINE HYDROCHLORIDE 10 MG/1
TABLET, FILM COATED ORAL
COMMUNITY
Start: 2022-06-01

## 2022-07-18 RX ORDER — DIPHENHYDRAMINE HYDROCHLORIDE 50 MG/ML
50 INJECTION INTRAMUSCULAR; INTRAVENOUS
Status: CANCELLED | OUTPATIENT
Start: 2022-08-15

## 2022-07-18 RX ORDER — ACETAMINOPHEN 325 MG/1
650 TABLET ORAL
Status: CANCELLED | OUTPATIENT
Start: 2022-08-15

## 2022-07-18 RX ORDER — EPINEPHRINE 1 MG/ML
0.3 INJECTION, SOLUTION, CONCENTRATE INTRAVENOUS PRN
Status: CANCELLED | OUTPATIENT
Start: 2022-08-15

## 2022-07-18 RX ORDER — SODIUM CHLORIDE 9 MG/ML
INJECTION, SOLUTION INTRAVENOUS CONTINUOUS
Status: CANCELLED | OUTPATIENT
Start: 2022-08-15

## 2022-07-18 RX ADMIN — GOSERELIN ACETATE 3.6 MG: 3.6 IMPLANT SUBCUTANEOUS at 15:00

## 2022-07-18 ASSESSMENT — ENCOUNTER SYMPTOMS
BACK PAIN: 1
CHEST TIGHTNESS: 0
ABDOMINAL PAIN: 0
CONSTIPATION: 0
WHEEZING: 0
EYES NEGATIVE: 1
NAUSEA: 0
SHORTNESS OF BREATH: 0
DIARRHEA: 0
TROUBLE SWALLOWING: 0
COLOR CHANGE: 0

## 2022-07-18 ASSESSMENT — PATIENT HEALTH QUESTIONNAIRE - PHQ9
SUM OF ALL RESPONSES TO PHQ9 QUESTIONS 1 & 2: 0
SUM OF ALL RESPONSES TO PHQ QUESTIONS 1-9: 0
2. FEELING DOWN, DEPRESSED OR HOPELESS: 0
1. LITTLE INTEREST OR PLEASURE IN DOING THINGS: 0
SUM OF ALL RESPONSES TO PHQ QUESTIONS 1-9: 0

## 2022-07-18 NOTE — PROGRESS NOTES
Arrived to the [facility]. Lupron completed. Patient tolerated well. Any issues or concerns during appointment: none. Patient aware of next infusion appointment on 8/15/22 (date) at 1500 (time). Patient aware of next lab and [facility] office visit on 9/12/22 (date) at 1500 (time). Patient instructed to call provider with temperature of 100.4 or greater or nausea/vomiting/ diarrhea or pain not controlled by medications  Discharged ambulatory.

## 2022-07-18 NOTE — PROGRESS NOTES
Clinical Social Work Note     Date of Service: 7/18/22     Length of Service: 20 minutes     Patient Diagnosis: hx breast cancer     Reason for Visit: follow up     Clinical Note: SW followed up with pt at NP appt. Pt stated she is doing well overall, identified occasional medical-related anxiety and identified healthy coping strategies in distraction, support, and continued engagement with healthcare team. Pt stated this does not negatively impact her QOL. SW normalized pt experience, encouraged ongoing expression of concerns, and offered ongoing support. Pt verbalized understanding and appreciation. SW provided information about [facility] activity group, encouraging engagement as able. Pt verbalized appreciation. Next Steps: SW provided pt with SW contact information and encouraged pt to call should any needs arise. Pt verbalized understanding. SW intends to follow up.     PHQ-9  7/18/2022   Little interest or pleasure in doing things 0   Little interest or pleasure in doing things -   Feeling down, depressed, or hopeless 0   PHQ-2 Score 0   Total Score PHQ 2 -   PHQ-9 Total Score 0

## 2022-07-18 NOTE — PROGRESS NOTES
continuation of dual Ab therapy every 3 weeks for a total of one year if tolerated, as well as, starting endocrine therapy (tamoxifen vs ovarian suppression), timing with possible future surgery and radiation. Patient was last seen by  Dr. Temi Ferrari on 1/21/19, and started cycle 5 of treatment the same day. She is due to return for cycle six on 2/11/19 and will complete TCHP in [place]. She relocated to the [facility] area and was seeking to establish local oncology care. Family Hx: unspecified type of cancer in paternal aunt. There is no family history of ovarian, breast or colon cancer   Social Hx: has a SO, moving to [facility], no children, s/p egg retrieval/preservation, non-smoker, drinks 2 beers/2 glasses wine/wk     She is here today for follow up on Aromasin, Zoladex. She has been very well overall since last seen. She denies any new concerns or changes. She has occasional aches in her axilla, not very bothersome/occurring sporadically and briefly. She denies any new breast concerns. She is taking Vit D. She denies any new aches or pains. She denies any shortness of breath. She denies any fevers, chills, or other infectious symptoms. S/p CT chest 6/2022 negative for any pulmonary disease. Chronological Events:   8/27/2018 right breast ultrasound: 3.5 cm mass with no suspicious axillary lymphadenopathy   9/11/2018 ultrasound-guided core biopsy: IDC, grade 3, %, PR60%, AR +3 positive, HER-2 FISH positive   9/17/2018 saw genetics counselor and underwent a collection/preservation   9/24/2018 right partial mastectomy and sentinel lymph node biopsy: IDC 3.4cm, ER SC AR positive, HER-2 FISH positive, 1/9 LN w/ macromets: [id number] stage IIB, surgical margins clear from invasive carcinoma, invasive carcinoma 0.5mm from deep,  1mm from lateral, 2mm fro anterior margins. Lateral margin + DCIS (for 3 mm), 1mm from deep and 2mm from inferior margins, others >2mm.      9/27/18 MRI breast: resection cavity, dense grandular tissue, no residual mass or abnormal enhancement. No adenopathy. 10/22/18 C1 TCHP   11/12/18 C2 TCHP    12/3/18 C3 TCHP    12/9/18 - 12/12/18 admitted for neutropenic fever after cycle 3   12/31/18 C4 delayed due to thrombocytopenia by 1 week, carboplatin dose adjusted to 700 mg (AUC 4.85).   1/21/19 C5 TCHP    2/4/19 oncology consultation   2/11/19 cycle 6 planned in [place]   5/6/19 f/u after moving from [place] [address], continuing HP    6/17/19 f/u HP, undergoing XRT with Dr Irene Quintana (16/25)   7/29/19 completed XRT and doing well, here for continued HP    8/27/19 echo - EF preserved   9/9/19 f/u; reviewed echo, Effexor Rx, plan to see [facility] soon; left VM for Dr Romulo Ness   10/21/19 follow up and last HP   10/29 port removed    11/20 Surgery at [facility] - completion mastectomies with recon    12/9/19 f/u doing well; no acute issues/concerns, to f/u with dr Angel Luis Chambers for tissue expansion after [place] visit. Tamoxifen. On venlafaxine for hot flashes. Also will be starting OS with goserelin upon return from [place].     1/6/2020 here for follow-up and first goserelin injection. Continue with endocrine therapy. DEXA results reviewed and low normal-vitamin D and calcium to continue. Echo results reviewed 55 to 60%. Continue with tissue expansion as recommended  by plastics. Continue with venlafaxine for hot flashes. Tissue expander leakage - exchanged implant in [place]    3/30/20 fu; goserelin; switch to AI per SOFT/TEXT data; Ativan refill for insomnia. 6/22/2020 here for follow up. Doing well. On Arimidex and Zoladex. No new issues. 7/20/20 Zoladex inj    8/13/20 FU for SE management; Rx exemestane; hip/pelvis xrays   8/17/20 Zoladex inj    9/21/20 Zoladex inj    10/19/20 FU doing ok on exemestane and Zoladex; Rx Ativan.   Flu vaccine; SW re counseling resources    11/4/20 paragard IUD insertion    11/16/20 Zoladex    11/25/20 reviewed case with rad - p/w CT chest    12/11/20 CT chest - EXPECTED POSTOPERATIVE APPEARANCE OF THE CHEST WITH NO DEFINITE SOFT TISSUE OR BONY ABNORMALITY TO ACCOUNT FOR THE PERSISTENT PAIN AT THE INFEROLATERAL MARGIN OF  THE RIGHT BREAST RECONSTRUCTION. IF FURTHER IMAGING EVALUATION IS CLINICALLY INDICATED AT THIS TIME, THEN BREAST MRI WOULD BE MOST USEFUL    12/14/20 FU ET chest reviewed. We will proceed with MRI per radiology recommendations to evaluate the area further. Continue with Zoladex/exemestane. Has been in touch with  and community resources. 12/23/20 MRI - Bilateral implant contours appear intact. There is mild parenchymal enhancement bilaterally. However, there is ill-defined enhancement extending   over approximately 4 cm along the posterolateral margin of the right breast implant from approximately 7:00 to 8:00 which is asymmetric compared with the postoperative breasts elsewhere bilaterally. This asymmetric enhancement is in the area of focal  pain identified by the patient and marked with a metallic BB at the time of chest CT on December 11, 2020. could be postop reactive changes. No pathologically enlarged or dysmorphic lymph nodes are seen. The liver, lungs, and chest wall appear unremarkable  as imaged, accounting for artifact at the right cardiophrenic angle on STIR images 15 and 16 which may be associated with field inhomogeneity and/or diaphragmatic motion. Rec mammo/US.     1/5/21 mammo - Very limited mammogram assessment without clearly worrisome findings. Prior enhancement at the chest wall cannot be reassessed by mammography. Second Look US rec: Inability to further assess right chest wall enhancement by mammogram and  no clearly defined mass by ultrasound although only a Limited imaging window could be obtained. The prior changes were therefore much better assessed by breast   MRI.  Based on the prior recommendations, a PET scan is recommended for further assessment which I also agree is the most appropriate next step. 1/13/21 PET - No FDG avid focus identified. 2/8/21 FU doing well, recent imaging reviewed and TRU. Rec short interval MRI (best exam for her case per rad). [address] reviewing her imaging too for second opinion. Low vit B12/low norm vit D - will adjust dosing and rec replacement. 2/22/21 B12 inj    3/1/21 B12 inj    3/8/21 B12 and Zoladex    3/15/21 B12 inj    4/8/21 MRi breast - IMPRESSION   1. Improving enhancement at the level of right breast pain as described above. This along with interval negative PET scan favors benign changes. No evolving worrisome enhancement is seen on today's breast MRI. Specifically, no evidence for evolving  breast malignancy or metastatic disease is seen. Routine follow-up   is therefore recommended with the patient's next screening mammogram to occur in January 2022, unless clinically indicated sooner. 4/12/21 FU doing well; MRI reviewed; labs reviewed. B12 >500, vit D 39    7/13/21 FU doing well. No new issues/concerns. Monitor LN above left breast for changes. 10/2021 DEXA in Dec, MRI in April, c/w AI/zoladex; US of left chest above implant, c/w vit D   10/20/21 US left breast - Normal left breast ultrasound. Clinical management of any palpable   abnormality is recommended. Consider future follow up screening breast MRI, as   recommended on prior breast MRI April 2021.   12/14/21 DEXA - Bone mineral density is within expected range for age. 1/3/22 FU - AI/Zoladex. Increased hot flashes - increase Effexor to 112.5 mg (3 capsules) daily and reassess. Recent COVID + and just prior booster. Labs reviewed. 4/2022 FU after MR - results reviewed; discussed DEXA; CT chest per [address]   6/20/22 CT chest - no evidence of pulmonary metastatic       7/18/22 FU - doing well, p/w Zoladex monthly and Aromasin. Review of Systems   Constitutional:  Positive for diaphoresis (better).  Negative for activity change, appetite change, chills, fatigue, fever and unexpected weight change. HENT:  Negative for congestion, mouth sores and trouble swallowing. Eyes: Negative. Respiratory:  Negative for chest tightness, shortness of breath and wheezing. Cardiovascular:  Negative for chest pain, palpitations and leg swelling. Gastrointestinal:  Negative for abdominal pain, constipation, diarrhea and nausea. Genitourinary:  Negative for dysuria, hematuria and urgency. Musculoskeletal:  Positive for arthralgias and back pain. Skin:  Negative for color change, pallor and rash. Neurological:  Negative for dizziness, weakness and numbness. Hematological:  Negative for adenopathy. Does not bruise/bleed easily. Psychiatric/Behavioral:  Negative for suicidal ideas. The patient is not nervous/anxious. No Known Allergies    Past Medical History:   Diagnosis Date    Anxiety     Asthma     Breast cancer ([facility] [address] 2019    Cancer ([facility] 09/2018    stage 2 triple positive ductal breast cancer     Past Surgical History:   Procedure Laterality Date    BIOPSY OF BREAST, NEEDLE CORE Right 09/2018    BREAST LUMPECTOMY  09/2018    BREAST SURGERY      IMPLANT BREAST SILICONE/EQ  51/4303    IR REMOVE TUNNELED VAD W PORT  10/29/2019    MASTECTOMY Bilateral 11/2019    ORTHOPEDIC SURGERY Left 06/2010    knee surgery    ORTHOPEDIC SURGERY Right 06/2009    knee surgery     Family History   Problem Relation Age of Onset    Breast Cancer Paternal Aunt 64    Cancer Mother         skin cancer    Cancer Father         skin cancer    Cancer Paternal Aunt         breast cancer     Social History     Socioeconomic History    Marital status:      Spouse name: Not on file    Number of children: Not on file    Years of education: Not on file    Highest education level: Not on file   Occupational History    Not on file   Tobacco Use    Smoking status: Never    Smokeless tobacco: Never   Substance and Sexual Activity    Alcohol use:  Yes     Alcohol/week: 4.0 standard drinks    Drug use: No    Sexual activity: Not on file   Other Topics Concern    Not on file   Social History Narrative    Not on file     Social Determinants of Health     Financial Resource Strain: Not on file   Food Insecurity: Not on file   Transportation Needs: Not on file   Physical Activity: Not on file   Stress: Not on file   Social Connections: Not on file   Intimate Partner Violence: Not on file   Housing Stability: Not on file     Current Outpatient Medications   Medication Sig Dispense Refill    hydrOXYzine HCl (ATARAX) 10 MG tablet       Vitamin D, Cholecalciferol, 50 MCG (2000 UT) CAPS Take by mouth      albuterol sulfate  (90 Base) MCG/ACT inhaler Inhale into the lungs as needed      calcium carb-cholecalciferol 600-400 MG-UNIT TABS per tab Take 1 tablet by mouth daily      Paragard Intrauterine Copper IUD by IntraUTERine route      exemestane (AROMASIN) 25 MG tablet Take 25 mg by mouth daily      LORazepam (ATIVAN) 1 MG tablet Take 1 mg by mouth every 8 hours as needed. venlafaxine (EFFEXOR XR) 37.5 MG extended release capsule Take 112.5 mg by mouth daily       No current facility-administered medications for this visit. PHQ-9  7/18/2022 4/25/2022 1/3/2022   Little interest or pleasure in doing things 0 - -   Little interest or pleasure in doing things - 0 0   Feeling down, depressed, or hopeless 0 - -   PHQ-2 Score 0 - -   Total Score PHQ 2 - 0 0   PHQ-9 Total Score 0 - -          OBJECTIVE:  Visit Vitals  Vitals:    07/18/22 1418   BP: 122/84   Site: Left Upper Arm   Position: Sitting   Cuff Size: Medium Adult   Pulse: 79   Resp: 14   Temp: 98 °F (36.7 °C)   TempSrc: Oral   SpO2: 99%   Weight: 152 lb (68.9 kg)   Height: 5' 5\" (1.651 m)           ECOG PERFORMANCE STATUS - 0 - Fully active, able to carry on all pre-disease performance without restriction.        Pain - Pain Score:   0 - No pain (fatigue-3)/10.  no/minimal pain, not affecting QOL      Fatigue - Fatigue Scale  2/6/2020  8/8/2019  5/15/2019  2/6/2019           Fatigue   2 (mild fatigue)  1 (no fatigue)  1 (no fatigue)  2 (mild fatigue)        Distress -   PHQ-9  7/18/2022 4/25/2022 1/3/2022   Little interest or pleasure in doing things 0 - -   Little interest or pleasure in doing things - 0 0   Feeling down, depressed, or hopeless 0 - -   PHQ-2 Score 0 - -   Total Score PHQ 2 - 0 0   PHQ-9 Total Score 0 - -     Physical Exam  Constitutional:       General: She is not in acute distress. Appearance: Normal appearance. HENT:      Head: Normocephalic and atraumatic. Nose: No congestion. Mouth/Throat:      Mouth: Mucous membranes are moist.   Eyes:      General: No scleral icterus. Extraocular Movements: Extraocular movements intact. Conjunctiva/sclera: Conjunctivae normal.   Cardiovascular:      Rate and Rhythm: Normal rate and regular rhythm. Pulses: Normal pulses. Heart sounds: Normal heart sounds. Pulmonary:      Effort: Pulmonary effort is normal. No respiratory distress. Breath sounds: Normal breath sounds. Chest:      Comments: Hx: no palpable abnormality on right   Small (<1 cm) nodule above left implant--unchanged. No axillary LAD    S/p bilat mastectomies with recon   Abdominal:      General: Abdomen is flat. There is no distension. Musculoskeletal:         General: No swelling. Cervical back: Normal range of motion and neck supple. Lymphadenopathy:      Cervical: No cervical adenopathy. Skin:     General: Skin is warm and dry. Neurological:      General: No focal deficit present. Mental Status: She is alert and oriented to person, place, and time. Motor: No weakness.    Psychiatric:         Mood and Affect: Mood normal.         Behavior: Behavior normal.          Labs:  Hospital Outpatient Visit on 07/18/2022   Component Date Value Ref Range Status    WBC 07/18/2022 4.9  4.3 - 11.1 K/uL Final    RBC 07/18/2022 4.74  4.05 - 5.2 M/uL The physician must decide which value applies to the patient. The MDRD study equation should only be used in individuals age 25 or older. It has not been validated for the following: pregnant women, patients with serious comorbid conditions,or on certain medications, or persons with extremes of body size, muscle mass, or nutritional status. Calcium 07/18/2022 9.5  8.3 - 10.4 MG/DL Final    Total Bilirubin 07/18/2022 0.3  0.2 - 1.1 MG/DL Final    ALT 07/18/2022 18  12 - 65 U/L Final    AST 07/18/2022 13 (A) 15 - 37 U/L Final    Alk Phosphatase 07/18/2022 84  50 - 136 U/L Final    Total Protein 07/18/2022 7.7  6.3 - 8.2 g/dL Final    Albumin 07/18/2022 4.3  3.5 - 5.0 g/dL Final    Globulin 07/18/2022 3.4  2.3 - 3.5 g/dL Final    Albumin/Globulin Ratio 07/18/2022 1.3  1.2 - 3.5   Final            Pathology:        RIGHT BREAST/AXILLA ULTRASOUND 8/27/18   FINDINGS:             SURGICAL PATHOOGY 9/11/2018                 SURGICAL PATHOLOGY 9/24/2018                           MRI BREAST B/L W CONTRAST 9/27/2018   FINDINGS:                   ASSESSMENT:    ICD-10-CM    1. Malignant neoplasm of upper-outer quadrant of right breast in female, estrogen receptor positive ([facility] [address]  C50.411     Z17.0       2. Hx of bilateral mastectomy  Z90.13               PLAN:   Ms Miranda Colon is a 33yo woman with triple positive breast cancer s/p surgery and adjuvant TCHP. She transferred care for continued HP after moving to [place] from [place]. She is here for follow up.  s/p recon at [facility] with local tissue expansion.           1. Right breast IDC 9:00 %/PR60%AR+3 (positive) /Her2 + by FISH, 3.5cm mass by US, s/p partial mastectomy, 1/9 LN with macromets - [id number]   - completed TCHP in [place] (Dr Pia Gaines), currently on maintenance HP (started in 10/2018)   - s/p XRT with Dr Ksenia Joshi    - s/p completion bilat mastectomies with recon surgery at [facility] 11/20/19 - per pt (will get op/path reports - RN aware)   - elected to proceed with Tamoxifen with OS perioperatively    - switch to anastrozole/OS (goserelin) 3/2020    - switch to exemestane 8/2020/OS       - here for follow-up on exemestane/Zoladex. She is doing well overall.     - Previously, We discussed her DEXA results reviewing statistics behind the testing and how to interpret this in her case. Most likely will transition to stronger bone strengthener at next DEXA. For now she will continue with vitamin D. We will check vitamin D  levels to see if the current dose is adequate. - Most recent MRI reviewed with the patient we will proceed with CT scan per her plastic surgeon from [facility] recommendation; CT chest 6/20/22 - negative for pulmonary disease.     - She denies any new breast concerns, lumps or bumps. She denies any new pain. - hot flashes - on venlafaxine    - mood - improved on venlafaxine   - echo results previously reviewed - normal EF      - menses - no menses for a few months. Discussed use of IUD, reviewed safe vaginal lubricants; previously referred to Dr Meera Baker per request; Cu++ IUD inserted    - She can use turmeric and chondroitin. - Vaginal dryness- better, does not seem to be an issue at this time       RTC in ~3-4mo or sooner as needed   C/w monthly Zoladex inj       Historical:    - we discussed the pathophysiology of breast cancer, staging, and the importance of receptor status in terms of treatment options. We then reviewed her medical history as well as oncologic history, recent imaging and pathology in details; plan to complete  TCHP in [place]. She is also going to go back for her surgical interventions to [place] later this year; referred to XRT and we discussed endocrine therapy and SE. She will start that after XRT. - Previously had a discussion regarding addition of ovarian function suppression/ablation to either AI or tamoxifen as it modestly improves the risk of recurrence but increases toxicity in women with ER+ disease.   We reviewed the SOFT and TEXT trial data  in details. In the SOFT Trial >3K women were assigned to 1) tamoxifen alone, 2) stahl/OFS, 3) exemestane/ OFS. In TEXT [place] >2600 women were assigned to stahl/ovaraian suppression or exemestane w. Ovarian suppression. Compared with stahl alone, OFS improved  8 year DFS from 79 to 83 (stahl)-86% (exemestane). - women who can be considered at higher risk for recurrence include those who qualify for chemotherapy, age <35, node positive disease, larger tumor size, high tumor grade, LVI and/or high risk recurrence score on genomic assay, and on case-by-case basis. For women who are not at high risk of recurrence and over 35, tamoxifen alone can be started rather than OFS with endocrine therapy. Moreover, although women with Her2 + disease were included in the trial the analysis is complicated. Only ~60% of Her2  + pts in SOFT and TEXT trails got trastuzumab. Today, we have a number of txs available to women with Her2 positive disease and it seems rather unlikely that ovarian suppression will be evaluated in this pt population in a large clinical trial.  In addition,  women with Her2 positive disease have great prognosis after chemotherapy/available targeted therapies with standard endocrine therapy. She will think about these options and let me know how she'd like to proceed. She has decided to proceed with Tamoxifen  with ovarian suppression.     - plan to switch to AI with OS now; completed surgical interventions - s/p implant exchange ~3wks ago. Continues to have hot flashes on Stahl.  Switching to anastrozole with monthly OS - stressed  importance of OS need while on AI and premenopausal.  Anastrozole can cause hot flashes, HTN, angina, swelling, fatigue, bone thinning leading to osteoporosis/fractures, joint stiffness, N/V, hair thinning, weight changes, thrombosis and worsening depression  to name a few. Printed info given to pt for her reference. MOA reviewed.     - pain under right implant/over ribs - relatively stable; s/p CT/MRI/mammo/US and PET. MRI with some enhancement adj to implant - could be post-surgical.  Reviewed in tumor board  and felt to be benign. No worrisome findings on subsequent mammo/US and PET. Now improvement noted on current MRI further suggesting benign finding.     - small (<1cm) nodule above left implant - felt over ribs when pt raises her arm - will be eval on MRI, ? LN - not noted on MRI--stable from previous and unchanged per patient. - easy bruising - mostly over LE - will check nutritional labs : iron and B12; previous results showed B12 defic - s/p 4x B12 inj with sig improvement in counts, can continue with oral supplementation,  can also start vit C daily; checked PFA-100 - pending. Lab studies and imaging studies were personally reviewed. Pertinent old records were reviewed. All questions were asked and answered to the best of my ability. The patient verbalized understanding and agrees with the plan above.         MDM   Number of Diagnoses or Management Options   Hot flashes related to aromatase inhibitor therapy: established, improving   Malignant neoplasm of upper-outer quadrant of right breast in female, estrogen receptor positive ([facility] [address]): established, improving       Amount and/or Complexity of Data Reviewed   Clinical lab tests: ordered and reviewed   Tests in the radiology section of CPT®: ordered and reviewed   Tests in the medicine section of CPT®: ordered    Review and summarize past medical records: yes    Independent visualization of images, tracings, or specimens: yes      Risk of Complications, Morbidity, and/or Mortality   Presenting problems: moderate  Diagnostic procedures: low  Management options: moderate                  LYSSA Lawson NP  [facility]  [address] [place] [address]  Office : [phone]  Fax : [phone]

## 2022-08-15 ENCOUNTER — HOSPITAL ENCOUNTER (OUTPATIENT)
Dept: INFUSION THERAPY | Age: 32
Discharge: HOME | End: 2022-08-15
Payer: COMMERCIAL

## 2022-08-15 ENCOUNTER — HOSPITAL ENCOUNTER (OUTPATIENT)
Dept: LAB | Age: 32
Discharge: HOME | End: 2022-08-18

## 2022-08-15 VITALS
SYSTOLIC BLOOD PRESSURE: 116 MMHG | TEMPERATURE: 98.8 F | RESPIRATION RATE: 18 BRPM | OXYGEN SATURATION: 98 % | DIASTOLIC BLOOD PRESSURE: 66 MMHG | HEART RATE: 64 BPM

## 2022-08-15 DIAGNOSIS — Z17.0 MALIGNANT NEOPLASM OF UPPER-OUTER QUADRANT OF RIGHT BREAST IN FEMALE, ESTROGEN RECEPTOR POSITIVE (HCC): Primary | ICD-10-CM

## 2022-08-15 DIAGNOSIS — C50.411 MALIGNANT NEOPLASM OF UPPER-OUTER QUADRANT OF RIGHT BREAST IN FEMALE, ESTROGEN RECEPTOR POSITIVE (HCC): Primary | ICD-10-CM

## 2022-08-15 LAB — HCG UR QL: NEGATIVE

## 2022-08-15 PROCEDURE — 6360000002 HC RX W HCPCS: Performed by: INTERNAL MEDICINE

## 2022-08-15 PROCEDURE — 81025 URINE PREGNANCY TEST: CPT

## 2022-08-15 PROCEDURE — 96402 CHEMO HORMON ANTINEOPL SQ/IM: CPT

## 2022-08-15 RX ORDER — ALBUTEROL SULFATE 90 UG/1
4 AEROSOL, METERED RESPIRATORY (INHALATION) PRN
Status: CANCELLED | OUTPATIENT
Start: 2022-09-12

## 2022-08-15 RX ORDER — EPINEPHRINE 1 MG/ML
0.3 INJECTION, SOLUTION, CONCENTRATE INTRAVENOUS PRN
Status: CANCELLED | OUTPATIENT
Start: 2022-09-12

## 2022-08-15 RX ORDER — DIPHENHYDRAMINE HYDROCHLORIDE 50 MG/ML
50 INJECTION INTRAMUSCULAR; INTRAVENOUS
Status: CANCELLED | OUTPATIENT
Start: 2022-09-12

## 2022-08-15 RX ORDER — SODIUM CHLORIDE 9 MG/ML
INJECTION, SOLUTION INTRAVENOUS CONTINUOUS
Status: CANCELLED | OUTPATIENT
Start: 2022-09-12

## 2022-08-15 RX ORDER — ACETAMINOPHEN 325 MG/1
650 TABLET ORAL
Status: CANCELLED | OUTPATIENT
Start: 2022-09-12

## 2022-08-15 RX ORDER — ONDANSETRON 2 MG/ML
8 INJECTION INTRAMUSCULAR; INTRAVENOUS
Status: CANCELLED | OUTPATIENT
Start: 2022-09-12

## 2022-08-15 RX ADMIN — GOSERELIN ACETATE 3.6 MG: 3.6 IMPLANT SUBCUTANEOUS at 16:01

## 2022-08-15 NOTE — PROGRESS NOTES
Arrived to the [facility]. Zoladex completed. Provided education on Zoladex    Patient instructed to report any side affects to ordering provider. Patient tolerated without complications. Any issues or concerns during appointment: NO.  Patient aware of next infusion appointment on 9/12/22 at 1500. Discharged ambulatory.

## 2022-09-12 ENCOUNTER — HOSPITAL ENCOUNTER (OUTPATIENT)
Dept: INFUSION THERAPY | Age: 32
Discharge: HOME | End: 2022-09-12
Payer: COMMERCIAL

## 2022-09-12 VITALS
RESPIRATION RATE: 16 BRPM | OXYGEN SATURATION: 97 % | WEIGHT: 157 LBS | BODY MASS INDEX: 26.13 KG/M2 | HEART RATE: 70 BPM | DIASTOLIC BLOOD PRESSURE: 91 MMHG | SYSTOLIC BLOOD PRESSURE: 149 MMHG | TEMPERATURE: 98.3 F

## 2022-09-12 DIAGNOSIS — Z17.0 MALIGNANT NEOPLASM OF UPPER-OUTER QUADRANT OF RIGHT BREAST IN FEMALE, ESTROGEN RECEPTOR POSITIVE (HCC): Primary | ICD-10-CM

## 2022-09-12 DIAGNOSIS — C50.411 MALIGNANT NEOPLASM OF UPPER-OUTER QUADRANT OF RIGHT BREAST IN FEMALE, ESTROGEN RECEPTOR POSITIVE (HCC): Primary | ICD-10-CM

## 2022-09-12 PROCEDURE — 96402 CHEMO HORMON ANTINEOPL SQ/IM: CPT

## 2022-09-12 PROCEDURE — 6360000002 HC RX W HCPCS: Performed by: INTERNAL MEDICINE

## 2022-09-12 RX ORDER — ACETAMINOPHEN 325 MG/1
650 TABLET ORAL
OUTPATIENT
Start: 2022-10-10

## 2022-09-12 RX ORDER — ALBUTEROL SULFATE 90 UG/1
4 AEROSOL, METERED RESPIRATORY (INHALATION) PRN
OUTPATIENT
Start: 2022-10-10

## 2022-09-12 RX ORDER — DIPHENHYDRAMINE HYDROCHLORIDE 50 MG/ML
50 INJECTION INTRAMUSCULAR; INTRAVENOUS
OUTPATIENT
Start: 2022-10-10

## 2022-09-12 RX ORDER — EPINEPHRINE 1 MG/ML
0.3 INJECTION, SOLUTION, CONCENTRATE INTRAVENOUS PRN
OUTPATIENT
Start: 2022-10-10

## 2022-09-12 RX ORDER — ONDANSETRON 2 MG/ML
8 INJECTION INTRAMUSCULAR; INTRAVENOUS
OUTPATIENT
Start: 2022-10-10

## 2022-09-12 RX ORDER — SODIUM CHLORIDE 9 MG/ML
INJECTION, SOLUTION INTRAVENOUS CONTINUOUS
OUTPATIENT
Start: 2022-10-10

## 2022-09-12 RX ADMIN — GOSERELIN ACETATE 3.6 MG: 3.6 IMPLANT SUBCUTANEOUS at 15:40

## 2022-09-12 NOTE — PROGRESS NOTES
Arrived to the [facility]. Zoladex completed. Provided education on pregnancy surveillance. Patient instructed to report any side affects to ordering provider. Patient tolerated well. Any issues or concerns during appointment: none. Patient aware of next infusion appointment on 10/10 at 3:00 pm.  Discharged ambulatory to home.

## 2022-10-07 DIAGNOSIS — D05.11 INTRADUCTAL CARCINOMA IN SITU OF RIGHT BREAST: Primary | ICD-10-CM

## 2022-10-09 ASSESSMENT — ENCOUNTER SYMPTOMS
SHORTNESS OF BREATH: 0
NAUSEA: 0
DIARRHEA: 0
BACK PAIN: 1
ABDOMINAL PAIN: 0
CONSTIPATION: 0
COLOR CHANGE: 0
TROUBLE SWALLOWING: 0
WHEEZING: 0
CHEST TIGHTNESS: 0
EYES NEGATIVE: 1

## 2022-10-09 NOTE — PROGRESS NOTES
[facility] Hematology and Oncology: Office Visit Established Patient    Chief Complaint   Patient presents with    Follow-up         Diagnosis: ER/IN/AR/Her+ right breast cancer    Right under implant/rib pain - same    S/p bilat mastectomies with recon at [facility] in [place] - path sent to scanning       History of Present Illness:   Ms. Nikolay Maldonado is a  28 y.o. female who returns today for management of breast cancer. The past medical history  is significant for asthma. She initially presented in August 2018 with a mass in her right lateral breast that had been present for approximately 8 years with no nipple discharge or skin changes. Initial workup, including right breast US on 8/27/18  identified a 3.5 cm mass with no suspicious axillary lymph nodes. On 9/11/2018 she underwent US guided core biopsy which identified IDC, grade 3, measuring 13 mm in maximal linear dimension. She saw genetics counselor for testing on 9/17/18 and  underwent egg collection and preservation. She subsequently underwent right partial mastectomy and sentinel lymph node biopsy on 9/24/18 which revealed stage IIb ([id number]), ER/IN/ AR+, HER-2 FISH positive, IDC at 9 o'clock. Surgical margins were  uninvolved by invasive carcinoma, however, DCIS was present at the lateral margin focally, 3 mm, focus of DCIS was also found 1 mm from deep margin and 2 mm from inferior margin. Micrometastasis measuring 13 mm was also found in 1/9 lymph nodes. No  micrometastatic disease was appreciated. She was then seen by Dr. Shayne Ibrahim, Oncologist at [facility] in [place], and on 10/22/18, she started C1 adjuvant TCHP. She developed neutropenic fever requiring hospitalization from  12/9/18 - 12/12/18 after C3. No source was found, but viral URI was presumed. C4 was delayed one week due to 70K platelet count. C4 was administered on 12/31/18 with carbo dose reduced to 700 mg (AUC 4.85).   Plan was to completed 6 cycles  of TCHP, with continuation of dual Ab therapy every 3 weeks for a total of one year if tolerated, as well as, starting endocrine therapy (tamoxifen vs ovarian suppression), timing with possible future surgery and radiation. Patient was last seen by  Dr. Devyn Waterman on 1/21/19, and started cycle 5 of treatment the same day. She is due to return for cycle six on 2/11/19 and will complete TCHP in [place]. She relocated to the [place] area and was seeking to establish local oncology care. S/p CT chest 6/2022 negative for any pulmonary disease. Family Hx: unspecified type of cancer in paternal aunt. There is no family history of ovarian, breast or colon cancer   Social Hx: has a SO, moving to [place], no children, s/p egg retrieval/preservation, non-smoker, drinks 2 beers/2 glasses wine/wk     Today, she is here for follow-up and next Zoladex and continues on Aromasin. She has been doing well overall. She is currently tired of injections but wishes to proceed with current plan. We discussed sexual health and ways to improve side effects of vaginal dryness as well as painful intercourse. She can try vaginal dilators and will also try a different lubricant and vaginal moisturizer. PLan for MR in April. No new issues/concerns. No new lumps/masses. No wt loss. Continues to stay active and working. Chronological Events:   8/27/2018 right breast ultrasound: 3.5 cm mass with no suspicious axillary lymphadenopathy   9/11/2018 ultrasound-guided core biopsy: IDC, grade 3, %, PR60%, AR +3 positive, HER-2 FISH positive   9/17/2018 saw genetics counselor and underwent a collection/preservation   9/24/2018 right partial mastectomy and sentinel lymph node biopsy: IDC 3.4cm, ER GA AR positive, HER-2 FISH positive, 1/9 LN w/ macromets: [id number] stage IIB, surgical margins clear from invasive carcinoma, invasive carcinoma 0.5mm from deep,  1mm from lateral, 2mm fro anterior margins.   Lateral margin + DCIS (for 3 mm), 1mm from deep and 2mm from inferior margins, others >2mm. 9/27/18 MRI breast: resection cavity, dense grandular tissue, no residual mass or abnormal enhancement. No adenopathy. 10/22/18 C1 TCHP   11/12/18 C2 TCHP    12/3/18 C3 TCHP    12/9/18 - 12/12/18 admitted for neutropenic fever after cycle 3   12/31/18 C4 delayed due to thrombocytopenia by 1 week, carboplatin dose adjusted to 700 mg (AUC 4.85).   1/21/19 C5 TCHP    2/4/19 oncology consultation   2/11/19 cycle 6 planned in [place]   5/6/19 f/u after moving from [place], continuing HP    6/17/19 f/u HP, undergoing XRT with Dr Mar Reddy (16/25)   7/29/19 completed XRT and doing well, here for continued HP    8/27/19 echo - EF preserved   9/9/19 f/u; reviewed echo, Effexor Rx, plan to see [facility] soon; left VM for Dr Yaya Sheppard   10/21/19 follow up and last HP   10/29 port removed    11/20 Surgery at [facility] - completion mastectomies with recon    12/9/19 f/u doing well; no acute issues/concerns, to f/u with dr Angelina Riddle for tissue expansion after [place] visit. Tamoxifen. On venlafaxine for hot flashes. Also will be starting OS with goserelin upon return from [place].     1/6/2020 here for follow-up and first goserelin injection. Continue with endocrine therapy. DEXA results reviewed and low normal-vitamin D and calcium to continue. Echo results reviewed 55 to 60%. Continue with tissue expansion as recommended  by plastics. Continue with venlafaxine for hot flashes. Tissue expander leakage - exchanged implant in [place]    3/30/20 fu; goserelin; switch to AI per SOFT/TEXT data; Ativan refill for insomnia. 6/22/2020 here for follow up. Doing well. On Arimidex and Zoladex. No new issues. 7/20/20 Zoladex inj    8/13/20 FU for SE management; Rx exemestane; hip/pelvis xrays   8/17/20 Zoladex inj    9/21/20 Zoladex inj    10/19/20 FU doing ok on exemestane and Zoladex; Rx Ativan.   Flu vaccine; SW re counseling resources    11/4/20 paragard IUD insertion    11/16/20 Zoladex    11/25/20 reviewed case with rad - p/w CT chest    12/11/20 CT chest - EXPECTED POSTOPERATIVE APPEARANCE OF THE CHEST WITH NO DEFINITE SOFT TISSUE OR BONY ABNORMALITY TO ACCOUNT FOR THE PERSISTENT PAIN AT THE INFEROLATERAL MARGIN OF  THE RIGHT BREAST RECONSTRUCTION. IF FURTHER IMAGING EVALUATION IS CLINICALLY INDICATED AT THIS TIME, THEN BREAST MRI WOULD BE MOST USEFUL    12/14/20 FU ET chest reviewed. We will proceed with MRI per radiology recommendations to evaluate the area further. Continue with Zoladex/exemestane. Has been in touch with  and community resources. 12/23/20 MRI - Bilateral implant contours appear intact. There is mild parenchymal enhancement bilaterally. However, there is ill-defined enhancement extending   over approximately 4 cm along the posterolateral margin of the right breast implant from approximately 7:00 to 8:00 which is asymmetric compared with the postoperative breasts elsewhere bilaterally. This asymmetric enhancement is in the area of focal  pain identified by the patient and marked with a metallic BB at the time of chest CT on December 11, 2020. could be postop reactive changes. No pathologically enlarged or dysmorphic lymph nodes are seen. The liver, lungs, and chest wall appear unremarkable  as imaged, accounting for artifact at the right cardiophrenic angle on STIR images 15 and 16 which may be associated with field inhomogeneity and/or diaphragmatic motion. Rec mammo/US.     1/5/21 mammo - Very limited mammogram assessment without clearly worrisome findings. Prior enhancement at the chest wall cannot be reassessed by mammography. Second Look US rec: Inability to further assess right chest wall enhancement by mammogram and  no clearly defined mass by ultrasound although only a Limited imaging window could be obtained. The prior changes were therefore much better assessed by breast   MRI.  Based on the prior recommendations, a PET scan is recommended for further assessment which I also agree is the most appropriate next step. 1/13/21 PET - No FDG avid focus identified. 2/8/21 FU doing well, recent imaging reviewed and TRU. Rec short interval MRI (best exam for her case per rad). [address] reviewing her imaging too for second opinion. Low vit B12/low norm vit D - will adjust dosing and rec replacement. 2/22/21 B12 inj    3/1/21 B12 inj    3/8/21 B12 and Zoladex    3/15/21 B12 inj    4/8/21 MRi breast - IMPRESSION   1. Improving enhancement at the level of right breast pain as described above. This along with interval negative PET scan favors benign changes. No evolving worrisome enhancement is seen on today's breast MRI. Specifically, no evidence for evolving  breast malignancy or metastatic disease is seen. Routine follow-up   is therefore recommended with the patient's next screening mammogram to occur in January 2022, unless clinically indicated sooner. 4/12/21 FU doing well; MRI reviewed; labs reviewed. B12 >500, vit D 39    7/13/21 FU doing well. No new issues/concerns. Monitor LN above left breast for changes. 10/2021 DEXA in Dec, MRI in April, c/w AI/zoladex; US of left chest above implant, c/w vit D   10/20/21 US left breast - Normal left breast ultrasound. Clinical management of any palpable   abnormality is recommended. Consider future follow up screening breast MRI, as   recommended on prior breast MRI April 2021.   12/14/21 DEXA - Bone mineral density is within expected range for age. 1/3/22 FU - AI/Zoladex. Increased hot flashes - increase Effexor to 112.5 mg (3 capsules) daily and reassess. Recent COVID + and just prior booster. Labs reviewed. 4/2022 FU after MR - results reviewed; discussed DEXA; CT chest per [address]   6/20/22 CT chest - no evidence of pulmonary metastatic       7/18/22 FU - doing well, p/w Zoladex monthly and Aromasin.     10/2022 FU doing well; c/w Zoladex monthly and Aromasin; discussed sexual health     Review of Systems   Constitutional:  Positive for diaphoresis (better). Negative for activity change, appetite change, chills, fatigue, fever and unexpected weight change. HENT:  Negative for congestion, mouth sores and trouble swallowing. Eyes: Negative. Respiratory:  Negative for chest tightness, shortness of breath and wheezing. Cardiovascular:  Negative for chest pain, palpitations and leg swelling. Gastrointestinal:  Negative for abdominal pain, constipation, diarrhea and nausea. Genitourinary:  Negative for dysuria, hematuria and urgency. Musculoskeletal:  Positive for arthralgias and back pain. Skin:  Negative for color change, pallor and rash. Neurological:  Negative for dizziness, weakness and numbness. Hematological:  Negative for adenopathy. Does not bruise/bleed easily. Psychiatric/Behavioral:  Negative for suicidal ideas. The patient is not nervous/anxious.           No Known Allergies    Past Medical History:   Diagnosis Date    Anxiety     Asthma     Breast cancer ([facility] [address] 2019    Cancer ([facility] 09/2018    stage 2 triple positive ductal breast cancer     Past Surgical History:   Procedure Laterality Date    BIOPSY OF BREAST, NEEDLE CORE Right 09/2018    BREAST LUMPECTOMY  09/2018    BREAST SURGERY      IMPLANT BREAST SILICONE/EQ  45/1847    IR REMOVE TUNNELED VAD W PORT  10/29/2019    MASTECTOMY Bilateral 11/2019    ORTHOPEDIC SURGERY Left 06/2010    knee surgery    ORTHOPEDIC SURGERY Right 06/2009    knee surgery     Family History   Problem Relation Age of Onset    Breast Cancer Paternal Aunt 64    Cancer Mother         skin cancer    Cancer Father         skin cancer    Cancer Paternal Aunt         breast cancer     Social History     Socioeconomic History    Marital status:      Spouse name: Not on file    Number of children: Not on file    Years of education: Not on file    Highest education level: Not on file Occupational History    Not on file   Tobacco Use    Smoking status: Never    Smokeless tobacco: Never   Substance and Sexual Activity    Alcohol use: Yes     Alcohol/week: 4.0 standard drinks    Drug use: No    Sexual activity: Not on file   Other Topics Concern    Not on file   Social History Narrative    Not on file     Social Determinants of Health     Financial Resource Strain: Not on file   Food Insecurity: Not on file   Transportation Needs: Not on file   Physical Activity: Not on file   Stress: Not on file   Social Connections: Not on file   Intimate Partner Violence: Not on file   Housing Stability: Not on file     Current Outpatient Medications   Medication Sig Dispense Refill    hydrOXYzine HCl (ATARAX) 10 MG tablet       Vitamin D, Cholecalciferol, 50 MCG (2000 UT) CAPS Take by mouth      albuterol sulfate  (90 Base) MCG/ACT inhaler Inhale into the lungs as needed      calcium carb-cholecalciferol 600-400 MG-UNIT TABS per tab Take 1 tablet by mouth daily      Paragard Intrauterine Copper IUD by IntraUTERine route      exemestane (AROMASIN) 25 MG tablet Take 25 mg by mouth daily      LORazepam (ATIVAN) 1 MG tablet Take 1 mg by mouth every 8 hours as needed. venlafaxine (EFFEXOR XR) 37.5 MG extended release capsule Take 112.5 mg by mouth daily       No current facility-administered medications for this visit. OBJECTIVE:  Visit Vitals  Vitals:    10/10/22 1405 10/10/22 1412   BP: 125/69 123/85   Site: Left Upper Arm Left Upper Arm   Position: Sitting Standing   Pulse: 69 79   Resp: 12    Temp: 98.4 °F (36.9 °C)    SpO2: 97%    Weight: 156 lb 11.2 oz (71.1 kg)    Height: 5' 5\" (1.651 m)           ECOG PERFORMANCE STATUS - 0-Fully active, able to carry on all pre-disease performance without restriction. Pain - 0 - No pain/10. None/Minimal pain - not affecting QOL     Fatigue - No flowsheet data found. Distress - No flowsheet data found.         Physical Exam  Constitutional: General: She is not in acute distress. Appearance: Normal appearance. HENT:      Head: Normocephalic and atraumatic. Nose: No congestion. Mouth/Throat:      Mouth: Mucous membranes are moist.   Eyes:      General: No scleral icterus. Extraocular Movements: Extraocular movements intact. Conjunctiva/sclera: Conjunctivae normal.   Cardiovascular:      Rate and Rhythm: Normal rate and regular rhythm. Pulses: Normal pulses. Heart sounds: Normal heart sounds. Pulmonary:      Effort: Pulmonary effort is normal. No respiratory distress. Breath sounds: Normal breath sounds. Chest:      Comments: Hx: no palpable abnormality on right   Small (<1 cm) nodule above left implant--unchanged. No axillary LAD    S/p bilat mastectomies with recon   Abdominal:      General: Abdomen is flat. There is no distension. Musculoskeletal:         General: No swelling. Cervical back: Normal range of motion and neck supple. Lymphadenopathy:      Cervical: No cervical adenopathy. Skin:     General: Skin is warm and dry. Neurological:      General: No focal deficit present. Mental Status: She is alert and oriented to person, place, and time. Motor: No weakness.    Psychiatric:         Mood and Affect: Mood normal.         Behavior: Behavior normal.          Labs:  Hospital Outpatient Visit on 10/10/2022   Component Date Value Ref Range Status    WBC 10/10/2022 4.9  4.3 - 11.1 K/uL Final    RBC 10/10/2022 4.45  4.05 - 5.2 M/uL Final    Hemoglobin 10/10/2022 13.7  11.7 - 15.4 g/dL Final    Hematocrit 10/10/2022 40.4  35.8 - 46.3 % Final    MCV 10/10/2022 90.8  79.6 - 97.8 FL Final    MCH 10/10/2022 30.8  26.1 - 32.9 PG Final    MCHC 10/10/2022 33.9  31.4 - 35.0 g/dL Final    RDW 10/10/2022 11.9  11.9 - 14.6 % Final    Platelets 95/59/0187 250  150 - 450 K/uL Final    MPV 10/10/2022 11.5  9.4 - 12.3 FL Final    nRBC 10/10/2022 0.00  0.0 - 0.2 K/uL Final    **Note: Absolute NRBC parameter is now reported with Hemogram**    Differential Type 10/10/2022 AUTOMATED    Final    Seg Neutrophils 10/10/2022 57  43 - 78 % Final    Lymphocytes 10/10/2022 30  13 - 44 % Final    Monocytes 10/10/2022 6  4.0 - 12.0 % Final    Eosinophils % 10/10/2022 6  0.5 - 7.8 % Final    Basophils 10/10/2022 1  0.0 - 2.0 % Final    Immature Granulocytes 10/10/2022 0  0.0 - 5.0 % Final    Segs Absolute 10/10/2022 2.8  1.7 - 8.2 K/UL Final    Absolute Lymph # 10/10/2022 1.5  0.5 - 4.6 K/UL Final    Absolute Mono # 10/10/2022 0.3  0.1 - 1.3 K/UL Final    Absolute Eos # 10/10/2022 0.3  0.0 - 0.8 K/UL Final    Basophils Absolute 10/10/2022 0.0  0.0 - 0.2 K/UL Final    Absolute Immature Granulocyte 10/10/2022 0.0  0.0 - 0.5 K/UL Final    Sodium 10/10/2022 141  136 - 145 mmol/L Final    Potassium 10/10/2022 4.2  3.5 - 5.1 mmol/L Final    Chloride 10/10/2022 106  101 - 110 mmol/L Final    CO2 10/10/2022 31  21 - 32 mmol/L Final    Anion Gap 10/10/2022 4  4 - 13 mmol/L Final    Glucose 10/10/2022 113 (A)  65 - 100 mg/dL Final    BUN 10/10/2022 11  6 - 23 MG/DL Final    Creatinine 10/10/2022 0.80  0.6 - 1.0 MG/DL Final    Est, Glom Filt Rate 10/10/2022 >60  >60 ml/min/1.73m2 Final    Comment:      Pediatric calculator link: [email or web address] org/professionals/kdoqi/gfr_calculatorped       Effective Oct 3, 2022       These results are not intended for use in patients <25years of age. eGFR results are calculated without a race factor using  the 2021 CKD-EPI equation. Careful clinical correlation is recommended, particularly when comparing to results calculated using previous equations. The CKD-EPI equation is less accurate in patients with extremes of muscle mass, extra-renal metabolism of creatinine, excessive creatine ingestion, or following therapy that affects renal tubular secretion.       Calcium 10/10/2022 9.4  8.3 - 10.4 MG/DL Final    Total Bilirubin 10/10/2022 0.4  0.2 - 1.1 MG/DL Final    ALT 10/10/2022 21  12 - 65 U/L Final    AST 10/10/2022 14 (A)  15 - 37 U/L Final    Alk Phosphatase 10/10/2022 86  50 - 136 U/L Final    Total Protein 10/10/2022 7.3  6.3 - 8.2 g/dL Final    Albumin 10/10/2022 4.2  3.5 - 5.0 g/dL Final    Globulin 10/10/2022 3.1  2.3 - 3.5 g/dL Final    Albumin/Globulin Ratio 10/10/2022 1.4  1.2 - 3.5   Final    HCG(Urine) Pregnancy Test 10/10/2022 Negative  NEG   Final            Pathology:        RIGHT BREAST/AXILLA ULTRASOUND 8/27/18   FINDINGS:             SURGICAL PATHOOGY 9/11/2018                 SURGICAL PATHOLOGY 9/24/2018                           MRI BREAST B/L W CONTRAST 9/27/2018   FINDINGS:                   ASSESSMENT:    ICD-10-CM    1. Intraductal carcinoma in situ of right breast  D05.11 CBC with Auto Differential     Comprehensive Metabolic Panel     Vitamin D 25 Hydroxy              PLAN:   Ms Ayanna Trevino is a 28yo woman with triple positive breast cancer s/p surgery and TCHP. She transferred care for continued HP after moving to [place] from [place]. She is here for follow up.  s/p recon at [facility] with local tissue expansion. 1. Right breast IDC 9:00 %/PR60%AR+3 (positive) /Her2 + by FISH, 3.5cm mass by US, s/p partial mastectomy, 1/9 LN with macromets - [id number]   - completed TCHP in [place] (Dr Paco Guadarrama), then on maintenance HP (started in 10/2018)   - s/p XRT with Dr Elder Llanos    - s/p completion bilat mastectomies with recon surgery at [facility] 11/20/19 - per pt (will get op/path reports - RN aware)   - elected to proceed with Tamoxifen with OS perioperatively    - switch to anastrozole/OS (goserelin) 3/2020    - switch to exemestane 8/2020/OS      - here for follow-up and next Zoladex and continues on Aromasin. She has been doing well overall. She is currently tired of injections but wishes to proceed with current plan. - We discussed sexual health and ways to improve side effects of vaginal dryness as well as painful intercourse.   She can try vaginal dilators and will also try a different lubricant and vaginal moisturizer.    - PLan for MR in April. No new issues/concerns. No new lumps/masses. No wt loss. Continues to stay active and working.     - Previously, We discussed her DEXA results reviewing statistics behind the testing and how to interpret this in her case. Most likely will transition to stronger bone strengthener at next DEXA. For now she will continue with vitamin D. We will check vitamin D  levels to see if the current dose is adequate. - hot flashes - on venlafaxine    - mood - improved on venlafaxine   - echo results previously reviewed - normal EF      - previously referred to Dr Neptali Yepez Alt per request; Cu++ IUD inserted    - She can use turmeric and chondroitin. RTC in ~3-4mo or sooner as needed   C/w monthly Zoladex inj         All questions were asked and answered to the best of my ability. The patient verbalized understanding and agrees with the plan above. MDM      Historical:    - we discussed the pathophysiology of breast cancer, staging, and the importance of receptor status in terms of treatment options. We then reviewed her medical history as well as oncologic history, recent imaging and pathology in details; plan to complete  TCHP in [place]. She is also going to go back for her surgical interventions to [place] later this year; referred to XRT and we discussed endocrine therapy and SE. She will start that after XRT. - Previously had a discussion regarding addition of ovarian function suppression/ablation to either AI or tamoxifen as it modestly improves the risk of recurrence but increases toxicity in women with ER+ disease. We reviewed the SOFT and TEXT trial data  in details. In the SOFT Trial >3K women were assigned to 1) tamoxifen alone, 2) stahl/OFS, 3) exemestane/ OFS. In TEXT [place] >2600 women were assigned to stahl/ovaraian suppression or exemestane w. Ovarian suppression.   Compared with stahl alone, OFS improved  8 year DFS from 79 to 83 (stahl)-86% (exemestane). - women who can be considered at higher risk for recurrence include those who qualify for chemotherapy, age <35, node positive disease, larger tumor size, high tumor grade, LVI and/or high risk recurrence score on genomic assay, and on case-by-case basis. For women who are not at high risk of recurrence and over 35, tamoxifen alone can be started rather than OFS with endocrine therapy. Moreover, although women with Her2 + disease were included in the trial the analysis is complicated. Only ~60% of Her2  + pts in SOFT and TEXT trails got trastuzumab. Today, we have a number of txs available to women with Her2 positive disease and it seems rather unlikely that ovarian suppression will be evaluated in this pt population in a large clinical trial.  In addition,  women with Her2 positive disease have great prognosis after chemotherapy/available targeted therapies with standard endocrine therapy. She will think about these options and let me know how she'd like to proceed. She has decided to proceed with Tamoxifen  with ovarian suppression.     - plan to switch to AI with OS now; completed surgical interventions - s/p implant exchange ~3wks ago. Continues to have hot flashes on Stahl.  Switching to anastrozole with monthly OS - stressed  importance of OS need while on AI and premenopausal.  Anastrozole can cause hot flashes, HTN, angina, swelling, fatigue, bone thinning leading to osteoporosis/fractures, joint stiffness, N/V, hair thinning, weight changes, thrombosis and worsening depression  to name a few. Printed info given to pt for her reference. MOA reviewed. - pain under right implant/over ribs - relatively stable; s/p CT/MRI/mammo/US and PET. MRI with some enhancement adj to implant - could be post-surgical.  Reviewed in tumor board  and felt to be benign. No worrisome findings on subsequent mammo/US and PET.   Now improvement noted on current MRI further suggesting benign finding.     - small (<1cm) nodule above left implant - felt over ribs when pt raises her arm - will be eval on MRI, ? LN - not noted on MRI--stable from previous and unchanged per patient. - easy bruising - mostly over LE - will check nutritional labs : iron and B12; previous results showed B12 defic - s/p 4x B12 inj with sig improvement in counts, can continue with oral supplementation,  can also start vit C daily; checked PFA-100 - pending.     - Most recent MRI reviewed with the patient we will proceed with CT scan per her plastic surgeon from [facility] recommendation; CT chest 6/20/22 - negative for pulmonary disease. menses - no menses for a few months. Discussed use of IUD, reviewed safe vaginal lubricants;       Lab studies and imaging studies were personally reviewed. Pertinent old records were reviewed.            Minoo Berry MD, MD  [facility]  [address] [place] [address]  Office : [phone]  Fax : [phone]

## 2022-10-10 ENCOUNTER — HOSPITAL ENCOUNTER (OUTPATIENT)
Dept: INFUSION THERAPY | Age: 32
Discharge: HOME | End: 2022-10-10
Payer: COMMERCIAL

## 2022-10-10 ENCOUNTER — HOSPITAL ENCOUNTER (OUTPATIENT)
Dept: LAB | Age: 32
Discharge: HOME | End: 2022-10-13
Payer: COMMERCIAL

## 2022-10-10 ENCOUNTER — OFFICE VISIT (OUTPATIENT)
Dept: ONCOLOGY | Age: 32
End: 2022-10-10
Payer: COMMERCIAL

## 2022-10-10 VITALS
WEIGHT: 156.7 LBS | RESPIRATION RATE: 12 BRPM | OXYGEN SATURATION: 97 % | HEART RATE: 79 BPM | HEIGHT: 65 IN | SYSTOLIC BLOOD PRESSURE: 123 MMHG | BODY MASS INDEX: 26.11 KG/M2 | DIASTOLIC BLOOD PRESSURE: 85 MMHG | TEMPERATURE: 98.4 F

## 2022-10-10 DIAGNOSIS — T45.1X5A HOT FLASHES RELATED TO AROMATASE INHIBITOR THERAPY: ICD-10-CM

## 2022-10-10 DIAGNOSIS — Z17.0 MALIGNANT NEOPLASM OF UPPER-OUTER QUADRANT OF RIGHT BREAST IN FEMALE, ESTROGEN RECEPTOR POSITIVE (HCC): Primary | ICD-10-CM

## 2022-10-10 DIAGNOSIS — Z79.811 AROMATASE INHIBITOR USE: ICD-10-CM

## 2022-10-10 DIAGNOSIS — R23.2 HOT FLASHES RELATED TO AROMATASE INHIBITOR THERAPY: ICD-10-CM

## 2022-10-10 DIAGNOSIS — C50.411 MALIGNANT NEOPLASM OF UPPER-OUTER QUADRANT OF RIGHT BREAST IN FEMALE, ESTROGEN RECEPTOR POSITIVE (HCC): Primary | ICD-10-CM

## 2022-10-10 DIAGNOSIS — Z91.89 AT RISK FOR OSTEOPOROSIS: ICD-10-CM

## 2022-10-10 DIAGNOSIS — D05.11 INTRADUCTAL CARCINOMA IN SITU OF RIGHT BREAST: Primary | ICD-10-CM

## 2022-10-10 DIAGNOSIS — D05.11 INTRADUCTAL CARCINOMA IN SITU OF RIGHT BREAST: ICD-10-CM

## 2022-10-10 LAB
ALBUMIN SERPL-MCNC: 4.2 G/DL (ref 3.5–5)
ALBUMIN/GLOB SERPL: 1.4 {RATIO} (ref 1.2–3.5)
ALP SERPL-CCNC: 86 U/L (ref 50–136)
ALT SERPL-CCNC: 21 U/L (ref 12–65)
ANION GAP SERPL CALC-SCNC: 4 MMOL/L (ref 4–13)
AST SERPL-CCNC: 14 U/L (ref 15–37)
BASOPHILS # BLD: 0 K/UL (ref 0–0.2)
BASOPHILS NFR BLD: 1 % (ref 0–2)
BILIRUB SERPL-MCNC: 0.4 MG/DL (ref 0.2–1.1)
BUN SERPL-MCNC: 11 MG/DL (ref 6–23)
CALCIUM SERPL-MCNC: 9.4 MG/DL (ref 8.3–10.4)
CHLORIDE SERPL-SCNC: 106 MMOL/L (ref 101–110)
CO2 SERPL-SCNC: 31 MMOL/L (ref 21–32)
CREAT SERPL-MCNC: 0.8 MG/DL (ref 0.6–1)
DIFFERENTIAL METHOD BLD: NORMAL
EOSINOPHIL # BLD: 0.3 K/UL (ref 0–0.8)
EOSINOPHIL NFR BLD: 6 % (ref 0.5–7.8)
ERYTHROCYTE [DISTWIDTH] IN BLOOD BY AUTOMATED COUNT: 11.9 % (ref 11.9–14.6)
GLOBULIN SER CALC-MCNC: 3.1 G/DL (ref 2.3–3.5)
GLUCOSE SERPL-MCNC: 113 MG/DL (ref 65–100)
HCG UR QL: NEGATIVE
HCT VFR BLD AUTO: 40.4 % (ref 35.8–46.3)
HGB BLD-MCNC: 13.7 G/DL (ref 11.7–15.4)
IMM GRANULOCYTES # BLD AUTO: 0 K/UL (ref 0–0.5)
IMM GRANULOCYTES NFR BLD AUTO: 0 % (ref 0–5)
LYMPHOCYTES # BLD: 1.5 K/UL (ref 0.5–4.6)
LYMPHOCYTES NFR BLD: 30 % (ref 13–44)
MCH RBC QN AUTO: 30.8 PG (ref 26.1–32.9)
MCHC RBC AUTO-ENTMCNC: 33.9 G/DL (ref 31.4–35)
MCV RBC AUTO: 90.8 FL (ref 79.6–97.8)
MONOCYTES # BLD: 0.3 K/UL (ref 0.1–1.3)
MONOCYTES NFR BLD: 6 % (ref 4–12)
NEUTS SEG # BLD: 2.8 K/UL (ref 1.7–8.2)
NEUTS SEG NFR BLD: 57 % (ref 43–78)
NRBC # BLD: 0 K/UL (ref 0–0.2)
PLATELET # BLD AUTO: 250 K/UL (ref 150–450)
PMV BLD AUTO: 11.5 FL (ref 9.4–12.3)
POTASSIUM SERPL-SCNC: 4.2 MMOL/L (ref 3.5–5.1)
PROT SERPL-MCNC: 7.3 G/DL (ref 6.3–8.2)
RBC # BLD AUTO: 4.45 M/UL (ref 4.05–5.2)
SODIUM SERPL-SCNC: 141 MMOL/L (ref 136–145)
WBC # BLD AUTO: 4.9 K/UL (ref 4.3–11.1)

## 2022-10-10 PROCEDURE — 81025 URINE PREGNANCY TEST: CPT

## 2022-10-10 PROCEDURE — 85025 COMPLETE CBC W/AUTO DIFF WBC: CPT

## 2022-10-10 PROCEDURE — 96402 CHEMO HORMON ANTINEOPL SQ/IM: CPT

## 2022-10-10 PROCEDURE — 80053 COMPREHEN METABOLIC PANEL: CPT

## 2022-10-10 PROCEDURE — 6360000002 HC RX W HCPCS: Performed by: INTERNAL MEDICINE

## 2022-10-10 PROCEDURE — 99214 OFFICE O/P EST MOD 30 MIN: CPT | Performed by: INTERNAL MEDICINE

## 2022-10-10 RX ORDER — ACETAMINOPHEN 325 MG/1
650 TABLET ORAL
OUTPATIENT
Start: 2022-11-07

## 2022-10-10 RX ORDER — EPINEPHRINE 1 MG/ML
0.3 INJECTION, SOLUTION, CONCENTRATE INTRAVENOUS PRN
OUTPATIENT
Start: 2022-11-07

## 2022-10-10 RX ORDER — ONDANSETRON 2 MG/ML
8 INJECTION INTRAMUSCULAR; INTRAVENOUS
OUTPATIENT
Start: 2022-11-07

## 2022-10-10 RX ORDER — ALBUTEROL SULFATE 90 UG/1
4 AEROSOL, METERED RESPIRATORY (INHALATION) PRN
OUTPATIENT
Start: 2022-11-07

## 2022-10-10 RX ORDER — SODIUM CHLORIDE 9 MG/ML
INJECTION, SOLUTION INTRAVENOUS CONTINUOUS
OUTPATIENT
Start: 2022-11-07

## 2022-10-10 RX ORDER — DIPHENHYDRAMINE HYDROCHLORIDE 50 MG/ML
50 INJECTION INTRAMUSCULAR; INTRAVENOUS
OUTPATIENT
Start: 2022-11-07

## 2022-10-10 RX ADMIN — GOSERELIN ACETATE 3.6 MG: 3.6 IMPLANT SUBCUTANEOUS at 16:19

## 2022-10-10 ASSESSMENT — PATIENT HEALTH QUESTIONNAIRE - PHQ9
SUM OF ALL RESPONSES TO PHQ QUESTIONS 1-9: 0
2. FEELING DOWN, DEPRESSED OR HOPELESS: 0
SUM OF ALL RESPONSES TO PHQ QUESTIONS 1-9: 0

## 2022-10-10 NOTE — PROGRESS NOTES
Arrived to the [facility]. Zoladex completed. Provided education on Zoladex    Patient instructed to report any side affects to ordering provider. Patient tolerated without complications. Any issues or concerns during appointment: NO.  Patient aware of next infusion appointment on 11/7/22 at . Discharged ambulatory.

## 2022-10-10 NOTE — PATIENT INSTRUCTIONS
Patient Instructions from Today's Visit    Reason for Visit:  Follow up visit for breast cancer      Plan:    Zoladex today. Continue monthly. Continue Aromasin. Follow Up:  - 3 months    Recent Lab Results:  Hospital Outpatient Visit on 10/10/2022   Component Date Value Ref Range Status    Sodium 10/10/2022 141  136 - 145 mmol/L Final    Potassium 10/10/2022 4.2  3.5 - 5.1 mmol/L Final    Chloride 10/10/2022 106  101 - 110 mmol/L Final    CO2 10/10/2022 31  21 - 32 mmol/L Final    Anion Gap 10/10/2022 4  4 - 13 mmol/L Final    Glucose 10/10/2022 113 (A)  65 - 100 mg/dL Final    BUN 10/10/2022 11  6 - 23 MG/DL Final    Creatinine 10/10/2022 0.80  0.6 - 1.0 MG/DL Final    Est, Glom Filt Rate 10/10/2022 >60  >60 ml/min/1.73m2 Final    Comment:      Pediatric calculator link: [employer].at. org/professionals/kdoqi/gfr_calculatorped       Effective Oct 3, 2022       These results are not intended for use in patients <25years of age. eGFR results are calculated without a race factor using  the 2021 CKD-EPI equation. Careful clinical correlation is recommended, particularly when comparing to results calculated using previous equations. The CKD-EPI equation is less accurate in patients with extremes of muscle mass, extra-renal metabolism of creatinine, excessive creatine ingestion, or following therapy that affects renal tubular secretion.       Calcium 10/10/2022 9.4  8.3 - 10.4 MG/DL Final    Total Bilirubin 10/10/2022 0.4  0.2 - 1.1 MG/DL Final    ALT 10/10/2022 21  12 - 65 U/L Final    AST 10/10/2022 14 (A)  15 - 37 U/L Final    Alk Phosphatase 10/10/2022 86  50 - 136 U/L Final    Total Protein 10/10/2022 7.3  6.3 - 8.2 g/dL Final    Albumin 10/10/2022 4.2  3.5 - 5.0 g/dL Final    Globulin 10/10/2022 3.1  2.3 - 3.5 g/dL Final    Albumin/Globulin Ratio 10/10/2022 1.4  1.2 - 3.5   Final    HCG(Urine) Pregnancy Test 10/10/2022 Negative  NEG   Final        Treatment Summary has been discussed and given to patient: n/a        -------------------------------------------------------------------------------------------------------------------  Please call our office at [phone] if you have any  of the following symptoms:   Fever of 100.5 or greater  Chills  Shortness of breath  Swelling or pain in one leg    After office hours an answering service is available and will contact a provider for emergencies or if you are experiencing any of the above symptoms. Patient did express an interest in My Chart. My Chart log in information explained on the after visit summary printout at the [address] desk.     Liborio Montenegro RN

## 2022-10-11 ENCOUNTER — CLINICAL DOCUMENTATION (OUTPATIENT)
Dept: ONCOLOGY | Age: 32
End: 2022-10-11

## 2022-10-19 DIAGNOSIS — C50.411 MALIGNANT NEOPLASM OF UPPER-OUTER QUADRANT OF RIGHT FEMALE BREAST (HCC): ICD-10-CM

## 2022-11-07 ENCOUNTER — HOSPITAL ENCOUNTER (OUTPATIENT)
Dept: INFUSION THERAPY | Age: 32
Discharge: HOME | End: 2022-11-07
Payer: COMMERCIAL

## 2022-11-07 VITALS
TEMPERATURE: 98.4 F | OXYGEN SATURATION: 100 % | SYSTOLIC BLOOD PRESSURE: 128 MMHG | DIASTOLIC BLOOD PRESSURE: 74 MMHG | RESPIRATION RATE: 16 BRPM | HEART RATE: 86 BPM

## 2022-11-07 DIAGNOSIS — C50.411 MALIGNANT NEOPLASM OF UPPER-OUTER QUADRANT OF RIGHT BREAST IN FEMALE, ESTROGEN RECEPTOR POSITIVE (HCC): Primary | ICD-10-CM

## 2022-11-07 DIAGNOSIS — Z17.0 MALIGNANT NEOPLASM OF UPPER-OUTER QUADRANT OF RIGHT BREAST IN FEMALE, ESTROGEN RECEPTOR POSITIVE (HCC): Primary | ICD-10-CM

## 2022-11-07 PROCEDURE — 96372 THER/PROPH/DIAG INJ SC/IM: CPT

## 2022-11-07 PROCEDURE — 6360000002 HC RX W HCPCS: Performed by: INTERNAL MEDICINE

## 2022-11-07 RX ORDER — DIPHENHYDRAMINE HYDROCHLORIDE 50 MG/ML
50 INJECTION INTRAMUSCULAR; INTRAVENOUS
OUTPATIENT
Start: 2022-12-05

## 2022-11-07 RX ORDER — SODIUM CHLORIDE 9 MG/ML
INJECTION, SOLUTION INTRAVENOUS CONTINUOUS
OUTPATIENT
Start: 2022-12-05

## 2022-11-07 RX ORDER — ACETAMINOPHEN 325 MG/1
650 TABLET ORAL
OUTPATIENT
Start: 2022-12-05

## 2022-11-07 RX ORDER — ONDANSETRON 2 MG/ML
8 INJECTION INTRAMUSCULAR; INTRAVENOUS
OUTPATIENT
Start: 2022-12-05

## 2022-11-07 RX ORDER — ALBUTEROL SULFATE 90 UG/1
4 AEROSOL, METERED RESPIRATORY (INHALATION) PRN
OUTPATIENT
Start: 2022-12-05

## 2022-11-07 RX ORDER — EPINEPHRINE 1 MG/ML
0.3 INJECTION, SOLUTION, CONCENTRATE INTRAVENOUS PRN
OUTPATIENT
Start: 2022-12-05

## 2022-11-07 RX ADMIN — GOSERELIN ACETATE 3.6 MG: 3.6 IMPLANT SUBCUTANEOUS at 15:47

## 2022-11-07 NOTE — PROGRESS NOTES
Arrived to the [facility]. Zoladex completed. Provided education on Zoladex    Patient instructed to report any side affects to ordering provider. Patient tolerated without complications. Any issues or concerns during appointment: NO.  Patient aware of next infusion appointment on 12/5/22 at . Discharged ambulatory.

## 2022-11-21 PROBLEM — Z76.89 ENCOUNTER TO ESTABLISH CARE WITH NEW DOCTOR: Status: ACTIVE | Noted: 2022-11-21

## 2022-11-21 ASSESSMENT — ENCOUNTER SYMPTOMS
VOMITING: 0
DIARRHEA: 0
SHORTNESS OF BREATH: 0
ABDOMINAL PAIN: 0
COUGH: 0
NAUSEA: 0

## 2022-11-21 NOTE — PROGRESS NOTES
[address], DO  [address] [facility], [address], [address]  [phone]          ASSESSMENT AND PLAN    Problem List Items Addressed This Visit          Other    Encounter to establish care with new doctor - Primary        The diagnoses and plan were discussed with the patient, who verbalizes understanding and agrees with plan. All questions answered. Chief Complaint    Chief Complaint   Patient presents with    Establish Care       HISTORY OF PRESENT ILLNESS    33 y.o. female presents today to establish care with a new primary care provider. Originally from [facility], moved to [facility] about 4 years ago,  works for [employer]. Patient has a history of breast cancer, initially diagnosed in September 2018. S/P partial right mastectomy with sentinel lymph node biopsy that month. Completed TCHP in February 2019. Had a full mastectomy of both breasts right after and had reconstruction in March 2020. Continues to take Aromasin and gets Zoladex infusions. States that she is doing ok, experiencing menopausal symptoms. Has the Zoladex every four weeks. Notes that she has been taking Ativan since going through chemotherapy and usually takes on every few months. Started on Effexor when she started Zoladex due to hot flashes, recent surgery and recently moving to [facility], states that her dose was increased twice. States that earlier this year she trying to find a therapist but ended up seeing Psychiatry, who increased her dose again. States that she has noticed weight gain and states that she asked about cutting back on her Effexor. Psychiatrist was hesitant to do this. Does not feel that it has helped much with her hot flashes and wants to try to wean off of it.     PAST MEDICAL HISTORY    Past Medical History:   Diagnosis Date    Anxiety     Asthma     Breast cancer ([facility] [address] 2019    Cancer ([facility] [address] 09/2018    stage 2 triple positive ductal breast cancer       PAST SURGICAL HISTORY    Past Surgical History:   Procedure Laterality Date    BIOPSY OF BREAST, NEEDLE CORE Right 09/2018    BREAST LUMPECTOMY  09/2018    BREAST SURGERY      IMPLANT BREAST SILICONE/EQ  33/9508    IR REMOVE TUNNELED VAD W PORT  10/29/2019    MASTECTOMY Bilateral 11/2019    ORTHOPEDIC SURGERY Left 06/2010    knee surgery    ORTHOPEDIC SURGERY Right 06/2009    knee surgery       FAMILY HISTORY    Family History   Problem Relation Age of Onset    Breast Cancer Paternal Aunt 64    Cancer Mother         skin cancer    Cancer Father         skin cancer    Cancer Paternal Aunt         breast cancer       SOCIAL HISTORY    Social History     Socioeconomic History    Marital status:      Spouse name: None    Number of children: None    Years of education: None    Highest education level: None   Tobacco Use    Smoking status: Never    Smokeless tobacco: Never   Substance and Sexual Activity    Alcohol use: Yes     Alcohol/week: 4.0 standard drinks    Drug use: No       MEDICATIONS    Current Outpatient Medications:     hydrOXYzine HCl (ATARAX) 10 MG tablet, , Disp: , Rfl:     Vitamin D, Cholecalciferol, 50 MCG (2000 UT) CAPS, Take by mouth, Disp: , Rfl:     albuterol sulfate  (90 Base) MCG/ACT inhaler, Inhale into the lungs as needed, Disp: , Rfl:     calcium carb-cholecalciferol 600-400 MG-UNIT TABS per tab, Take 1 tablet by mouth daily, Disp: , Rfl:     Paragard Intrauterine Copper IUD, by IntraUTERine route, Disp: , Rfl:     exemestane (AROMASIN) 25 MG tablet, Take 25 mg by mouth daily, Disp: , Rfl:     LORazepam (ATIVAN) 1 MG tablet, Take 1 mg by mouth every 8 hours as needed. , Disp: , Rfl:     venlafaxine (EFFEXOR XR) 37.5 MG extended release capsule, Take 112 mg by mouth daily, Disp: , Rfl:       ALLERGIES / INTOLERANCES    No Known Allergies    REVIEW OF SYSTEMS    Review of Systems   Constitutional:  Negative for fever. HENT:  Negative for congestion. Respiratory:  Negative for cough and shortness of breath. Cardiovascular:  Negative for chest pain. Gastrointestinal:  Negative for abdominal pain, diarrhea, nausea and vomiting. Psychiatric/Behavioral:  Negative for dysphoric mood. PHYSICAL EXAMINATION    Vitals:    11/22/22 1535   BP: 124/68   Pulse: 79   Resp: 14   SpO2: 99%       Physical Exam  Vitals and nursing note reviewed. Constitutional:       General: She is not in acute distress. Appearance: Normal appearance. HENT:      Head: Normocephalic and atraumatic. Right Ear: External ear normal.      Left Ear: External ear normal.      Nose: Nose normal.   Eyes:      Extraocular Movements: Extraocular movements intact. Pupils: Pupils are equal, round, and reactive to light. Cardiovascular:      Rate and Rhythm: Normal rate and regular rhythm. Heart sounds: Normal heart sounds. No murmur heard. Pulmonary:      Effort: Pulmonary effort is normal. No respiratory distress. Breath sounds: Normal breath sounds. Abdominal:      General: Bowel sounds are normal.      Palpations: Abdomen is soft. Tenderness: There is no abdominal tenderness. There is no right CVA tenderness or left CVA tenderness. Musculoskeletal:         General: Normal range of motion. Cervical back: Normal range of motion. Skin:     General: Skin is warm. Findings: No rash. Neurological:      General: No focal deficit present. Mental Status: She is alert.    Psychiatric:         Mood and Affect: Mood normal.         Behavior: Behavior normal.         PERTINENT LABS AND IMAGING    Lab Results   Component Value Date     10/10/2022    K 4.2 10/10/2022     10/10/2022    CO2 31 10/10/2022    BUN 11 10/10/2022    CREATININE 0.80 10/10/2022    GLUCOSE 113 (H) 10/10/2022    CALCIUM 9.4 10/10/2022    PROT 7.3 10/10/2022    LABALBU 4.2 10/10/2022    BILITOT 0.4 10/10/2022    ALKPHOS 86 10/10/2022    AST 14 (L) 10/10/2022 ALT 21 10/10/2022    LABGLOM >60 10/10/2022    GFRAA >60 07/18/2022    AGRATIO 1.3 04/25/2022    GLOB 3.1 10/10/2022       Lab Results   Component Value Date    WBC 4.9 10/10/2022    HGB 13.7 10/10/2022    HCT 40.4 10/10/2022    MCV 90.8 10/10/2022     10/10/2022     Lab Results   Component Value Date/Time    VITD25 25.6 04/25/2022 03:18 PM          Vona Course, DO  4:45 PM  11/22/22

## 2022-11-22 ENCOUNTER — OFFICE VISIT (OUTPATIENT)
Dept: PRIMARY CARE CLINIC | Facility: CLINIC | Age: 32
End: 2022-11-22
Payer: COMMERCIAL

## 2022-11-22 VITALS
WEIGHT: 148.6 LBS | HEART RATE: 79 BPM | BODY MASS INDEX: 24.76 KG/M2 | SYSTOLIC BLOOD PRESSURE: 124 MMHG | HEIGHT: 65 IN | RESPIRATION RATE: 14 BRPM | DIASTOLIC BLOOD PRESSURE: 68 MMHG | OXYGEN SATURATION: 99 %

## 2022-11-22 DIAGNOSIS — Z76.89 ENCOUNTER TO ESTABLISH CARE WITH NEW DOCTOR: Primary | ICD-10-CM

## 2022-11-22 PROBLEM — C77.3 SECONDARY AND UNSPECIFIED MALIGNANT NEOPLASM OF AXILLA AND UPPER LIMB LYMPH NODES (HCC): Status: ACTIVE | Noted: 2019-09-09

## 2022-11-22 PROBLEM — D05.11 INTRADUCTAL CARCINOMA IN SITU OF RIGHT BREAST: Status: ACTIVE | Noted: 2019-09-09

## 2022-11-22 PROBLEM — C50.919 MALIGNANT NEOPLASM OF FEMALE BREAST (HCC): Status: ACTIVE | Noted: 2018-12-08

## 2022-11-22 PROCEDURE — 99204 OFFICE O/P NEW MOD 45 MIN: CPT | Performed by: FAMILY MEDICINE

## 2022-11-22 ASSESSMENT — ANXIETY QUESTIONNAIRES
7. FEELING AFRAID AS IF SOMETHING AWFUL MIGHT HAPPEN: 0
4. TROUBLE RELAXING: 0
GAD7 TOTAL SCORE: 0
1. FEELING NERVOUS, ANXIOUS, OR ON EDGE: 0
3. WORRYING TOO MUCH ABOUT DIFFERENT THINGS: 0
6. BECOMING EASILY ANNOYED OR IRRITABLE: 0
5. BEING SO RESTLESS THAT IT IS HARD TO SIT STILL: 0
IF YOU CHECKED OFF ANY PROBLEMS ON THIS QUESTIONNAIRE, HOW DIFFICULT HAVE THESE PROBLEMS MADE IT FOR YOU TO DO YOUR WORK, TAKE CARE OF THINGS AT HOME, OR GET ALONG WITH OTHER PEOPLE: NOT DIFFICULT AT ALL
2. NOT BEING ABLE TO STOP OR CONTROL WORRYING: 0

## 2022-11-22 ASSESSMENT — PATIENT HEALTH QUESTIONNAIRE - PHQ9
SUM OF ALL RESPONSES TO PHQ QUESTIONS 1-9: 0
1. LITTLE INTEREST OR PLEASURE IN DOING THINGS: 0
SUM OF ALL RESPONSES TO PHQ QUESTIONS 1-9: 0
2. FEELING DOWN, DEPRESSED OR HOPELESS: 0
SUM OF ALL RESPONSES TO PHQ QUESTIONS 1-9: 0
SUM OF ALL RESPONSES TO PHQ9 QUESTIONS 1 & 2: 0
SUM OF ALL RESPONSES TO PHQ QUESTIONS 1-9: 0

## 2022-11-22 NOTE — PATIENT INSTRUCTIONS
IT WAS GREAT TO MEET YOU TODAY!!    WE ARE GOING TO WEAN YOU OFF THE VENLAFAXINE AS FOLLOWS:    ~FOR THE FIRST 2 WEEKS, TAKE FIVE OF THE 37.5 MG CAPSULES ONCE A DAY. ~FOR THE NEXT 2 WEEKS, TAKE FOUR OF THE 37.5 MG CAPSULES ONCE A DAY. ~FOR THE NEXT 2 WEEKS, TAKE THREE OF THE 37.5 MG CAPSULES ONCE A DAY. ~FOR THE NEXT 2 WEEKS, TAKE TWO OF THE 37.5 MG CAPSULES ONCE A DAY. ~FOR THE NEXT 2 WEEKS, TAKE ONE OF THE 37.5 MG CAPSULES ONCE A DAY. ~FOR THE LAST 2 WEEKS, TAKE ONE EVERY OTHER DAY      WE WILL SEE YOU IN 3 MONTHS, PLEASE COME FASTING (NOTHING TO EAT OR DRINK AFTER MIDNIGHT THE NIGHT BEFORE EXCEPT PLAIN WATER) SO WE CAN UPDATE YOUR LABS.     PLEASE CALL US [phone] OR SEND ME A Divergence MESSAGE IF YOU HAVE ANY PROBLEMS

## 2022-12-05 ENCOUNTER — HOSPITAL ENCOUNTER (OUTPATIENT)
Dept: INFUSION THERAPY | Age: 32
Discharge: HOME | End: 2022-12-05
Payer: COMMERCIAL

## 2022-12-05 VITALS
HEART RATE: 78 BPM | SYSTOLIC BLOOD PRESSURE: 123 MMHG | TEMPERATURE: 98.3 F | OXYGEN SATURATION: 97 % | RESPIRATION RATE: 16 BRPM | DIASTOLIC BLOOD PRESSURE: 75 MMHG

## 2022-12-05 PROCEDURE — 96402 CHEMO HORMON ANTINEOPL SQ/IM: CPT

## 2022-12-05 PROCEDURE — 6360000002 HC RX W HCPCS: Performed by: INTERNAL MEDICINE

## 2022-12-05 RX ORDER — SODIUM CHLORIDE 9 MG/ML
INJECTION, SOLUTION INTRAVENOUS CONTINUOUS
OUTPATIENT
Start: 2023-01-02

## 2022-12-05 RX ORDER — DIPHENHYDRAMINE HYDROCHLORIDE 50 MG/ML
50 INJECTION INTRAMUSCULAR; INTRAVENOUS
OUTPATIENT
Start: 2023-01-02

## 2022-12-05 RX ORDER — ALBUTEROL SULFATE 90 UG/1
4 AEROSOL, METERED RESPIRATORY (INHALATION) PRN
OUTPATIENT
Start: 2023-01-02

## 2022-12-05 RX ORDER — ACETAMINOPHEN 325 MG/1
650 TABLET ORAL
OUTPATIENT
Start: 2023-01-02

## 2022-12-05 RX ORDER — EPINEPHRINE 1 MG/ML
0.3 INJECTION, SOLUTION, CONCENTRATE INTRAVENOUS PRN
OUTPATIENT
Start: 2023-01-02

## 2022-12-05 RX ORDER — ONDANSETRON 2 MG/ML
8 INJECTION INTRAMUSCULAR; INTRAVENOUS
OUTPATIENT
Start: 2023-01-02

## 2022-12-05 RX ADMIN — GOSERELIN ACETATE 3.6 MG: 3.6 IMPLANT SUBCUTANEOUS at 15:39

## 2022-12-05 NOTE — PROGRESS NOTES
Arrived to the [facility]. Zoladex completed. Provided education on Zoladex    Patient instructed to report any side affects to ordering provider. Patient tolerated without complications. Any issues or concerns during appointment: NO.  Patient aware of next infusion appointment on 1/2/23/ at 1400. Discharged ambulatory.

## 2023-01-02 ENCOUNTER — HOSPITAL ENCOUNTER (OUTPATIENT)
Dept: INFUSION THERAPY | Age: 33
Discharge: HOME | End: 2023-01-02
Payer: COMMERCIAL

## 2023-01-02 PROCEDURE — 6360000002 HC RX W HCPCS: Performed by: INTERNAL MEDICINE

## 2023-01-02 PROCEDURE — 96402 CHEMO HORMON ANTINEOPL SQ/IM: CPT

## 2023-01-02 RX ORDER — DIPHENHYDRAMINE HYDROCHLORIDE 50 MG/ML
50 INJECTION INTRAMUSCULAR; INTRAVENOUS
OUTPATIENT
Start: 2023-01-30

## 2023-01-02 RX ORDER — SODIUM CHLORIDE 9 MG/ML
INJECTION, SOLUTION INTRAVENOUS CONTINUOUS
OUTPATIENT
Start: 2023-01-30

## 2023-01-02 RX ORDER — EPINEPHRINE 1 MG/ML
0.3 INJECTION, SOLUTION, CONCENTRATE INTRAVENOUS PRN
OUTPATIENT
Start: 2023-01-30

## 2023-01-02 RX ORDER — ACETAMINOPHEN 325 MG/1
650 TABLET ORAL
OUTPATIENT
Start: 2023-01-30

## 2023-01-02 RX ORDER — ALBUTEROL SULFATE 90 UG/1
4 AEROSOL, METERED RESPIRATORY (INHALATION) PRN
OUTPATIENT
Start: 2023-01-30

## 2023-01-02 RX ORDER — ONDANSETRON 2 MG/ML
8 INJECTION INTRAMUSCULAR; INTRAVENOUS
OUTPATIENT
Start: 2023-01-30

## 2023-01-02 RX ADMIN — GOSERELIN ACETATE 3.6 MG: 3.6 IMPLANT SUBCUTANEOUS at 14:01

## 2023-01-03 NOTE — PROGRESS NOTES
Patient arrived to [facility] for Zoladex. Assessment completed. No needs voiced at this time. Patient tolerated injection well and is aware of next appointment on 1/30/2023 @1400. Patient discharged ambulatory.

## 2023-01-14 DIAGNOSIS — C50.411 MALIGNANT NEOPLASM OF UPPER-OUTER QUADRANT OF RIGHT FEMALE BREAST (HCC): ICD-10-CM

## 2023-01-30 ENCOUNTER — HOSPITAL ENCOUNTER (OUTPATIENT)
Dept: LAB | Age: 33
Discharge: HOME | End: 2023-02-02
Payer: COMMERCIAL

## 2023-01-30 ENCOUNTER — HOSPITAL ENCOUNTER (OUTPATIENT)
Dept: INFUSION THERAPY | Age: 33
Discharge: HOME | End: 2023-01-30
Payer: COMMERCIAL

## 2023-01-30 ENCOUNTER — OFFICE VISIT (OUTPATIENT)
Dept: ONCOLOGY | Age: 33
End: 2023-01-30
Payer: COMMERCIAL

## 2023-01-30 VITALS
RESPIRATION RATE: 14 BRPM | BODY MASS INDEX: 26.82 KG/M2 | HEIGHT: 65 IN | DIASTOLIC BLOOD PRESSURE: 73 MMHG | HEART RATE: 60 BPM | TEMPERATURE: 98.6 F | SYSTOLIC BLOOD PRESSURE: 131 MMHG | WEIGHT: 161 LBS | OXYGEN SATURATION: 100 %

## 2023-01-30 DIAGNOSIS — C50.919 MALIGNANT NEOPLASM OF BREAST IN FEMALE, ESTROGEN RECEPTOR POSITIVE, UNSPECIFIED LATERALITY, UNSPECIFIED SITE OF BREAST (HCC): Primary | ICD-10-CM

## 2023-01-30 DIAGNOSIS — Z79.899 HIGH RISK MEDICATION USE: Primary | ICD-10-CM

## 2023-01-30 DIAGNOSIS — T45.1X5A HOT FLASHES RELATED TO AROMATASE INHIBITOR THERAPY: ICD-10-CM

## 2023-01-30 DIAGNOSIS — Z79.811 AROMATASE INHIBITOR USE: ICD-10-CM

## 2023-01-30 DIAGNOSIS — Z79.899 HIGH RISK MEDICATION USE: ICD-10-CM

## 2023-01-30 DIAGNOSIS — Z91.89 AT RISK FOR OSTEOPOROSIS: ICD-10-CM

## 2023-01-30 DIAGNOSIS — D05.11 INTRADUCTAL CARCINOMA IN SITU OF RIGHT BREAST: ICD-10-CM

## 2023-01-30 DIAGNOSIS — R23.2 HOT FLASHES RELATED TO AROMATASE INHIBITOR THERAPY: ICD-10-CM

## 2023-01-30 DIAGNOSIS — Z17.0 MALIGNANT NEOPLASM OF BREAST IN FEMALE, ESTROGEN RECEPTOR POSITIVE, UNSPECIFIED LATERALITY, UNSPECIFIED SITE OF BREAST (HCC): Primary | ICD-10-CM

## 2023-01-30 DIAGNOSIS — Z90.13 HX OF BILATERAL MASTECTOMY: ICD-10-CM

## 2023-01-30 LAB
25(OH)D3 SERPL-MCNC: 34.3 NG/ML (ref 30–100)
ALBUMIN SERPL-MCNC: 4.4 G/DL (ref 3.5–5)
ALBUMIN/GLOB SERPL: 1.3 (ref 0.4–1.6)
ALP SERPL-CCNC: 81 U/L (ref 50–136)
ALT SERPL-CCNC: 19 U/L (ref 12–65)
ANION GAP SERPL CALC-SCNC: 5 MMOL/L (ref 2–11)
AST SERPL-CCNC: 10 U/L (ref 15–37)
BASOPHILS # BLD: 0 K/UL (ref 0–0.2)
BASOPHILS NFR BLD: 1 % (ref 0–2)
BILIRUB SERPL-MCNC: 0.2 MG/DL (ref 0.2–1.1)
BUN SERPL-MCNC: 10 MG/DL (ref 6–23)
CALCIUM SERPL-MCNC: 9.5 MG/DL (ref 8.3–10.4)
CHLORIDE SERPL-SCNC: 107 MMOL/L (ref 101–110)
CO2 SERPL-SCNC: 30 MMOL/L (ref 21–32)
CREAT SERPL-MCNC: 0.7 MG/DL (ref 0.6–1)
DIFFERENTIAL METHOD BLD: NORMAL
EOSINOPHIL # BLD: 0.3 K/UL (ref 0–0.8)
EOSINOPHIL NFR BLD: 6 % (ref 0.5–7.8)
ERYTHROCYTE [DISTWIDTH] IN BLOOD BY AUTOMATED COUNT: 12 % (ref 11.9–14.6)
GLOBULIN SER CALC-MCNC: 3.3 G/DL (ref 2.8–4.5)
GLUCOSE SERPL-MCNC: 100 MG/DL (ref 65–100)
HCG UR QL: NEGATIVE
HCT VFR BLD AUTO: 41 % (ref 35.8–46.3)
HGB BLD-MCNC: 14 G/DL (ref 11.7–15.4)
IMM GRANULOCYTES # BLD AUTO: 0 K/UL (ref 0–0.5)
IMM GRANULOCYTES NFR BLD AUTO: 0 % (ref 0–5)
LYMPHOCYTES # BLD: 1.7 K/UL (ref 0.5–4.6)
LYMPHOCYTES NFR BLD: 31 % (ref 13–44)
MCH RBC QN AUTO: 30.8 PG (ref 26.1–32.9)
MCHC RBC AUTO-ENTMCNC: 34.1 G/DL (ref 31.4–35)
MCV RBC AUTO: 90.3 FL (ref 82–102)
MONOCYTES # BLD: 0.4 K/UL (ref 0.1–1.3)
MONOCYTES NFR BLD: 7 % (ref 4–12)
NEUTS SEG # BLD: 3.1 K/UL (ref 1.7–8.2)
NEUTS SEG NFR BLD: 55 % (ref 43–78)
NRBC # BLD: 0 K/UL (ref 0–0.2)
PLATELET # BLD AUTO: 254 K/UL (ref 150–450)
PMV BLD AUTO: 10.7 FL (ref 9.4–12.3)
POTASSIUM SERPL-SCNC: 4.1 MMOL/L (ref 3.5–5.1)
PROT SERPL-MCNC: 7.7 G/DL (ref 6.3–8.2)
RBC # BLD AUTO: 4.54 M/UL (ref 4.05–5.2)
SODIUM SERPL-SCNC: 142 MMOL/L (ref 133–143)
WBC # BLD AUTO: 5.6 K/UL (ref 4.3–11.1)

## 2023-01-30 PROCEDURE — 6360000002 HC RX W HCPCS: Performed by: INTERNAL MEDICINE

## 2023-01-30 PROCEDURE — 36415 COLL VENOUS BLD VENIPUNCTURE: CPT

## 2023-01-30 PROCEDURE — 82306 VITAMIN D 25 HYDROXY: CPT

## 2023-01-30 PROCEDURE — 81025 URINE PREGNANCY TEST: CPT

## 2023-01-30 PROCEDURE — 80053 COMPREHEN METABOLIC PANEL: CPT

## 2023-01-30 PROCEDURE — 99214 OFFICE O/P EST MOD 30 MIN: CPT | Performed by: INTERNAL MEDICINE

## 2023-01-30 PROCEDURE — 85025 COMPLETE CBC W/AUTO DIFF WBC: CPT

## 2023-01-30 PROCEDURE — 96402 CHEMO HORMON ANTINEOPL SQ/IM: CPT

## 2023-01-30 RX ORDER — ACETAMINOPHEN 325 MG/1
650 TABLET ORAL
OUTPATIENT
Start: 2023-02-27

## 2023-01-30 RX ORDER — EPINEPHRINE 1 MG/ML
0.3 INJECTION, SOLUTION, CONCENTRATE INTRAVENOUS PRN
OUTPATIENT
Start: 2023-02-27

## 2023-01-30 RX ORDER — SODIUM CHLORIDE 9 MG/ML
INJECTION, SOLUTION INTRAVENOUS CONTINUOUS
OUTPATIENT
Start: 2023-02-27

## 2023-01-30 RX ORDER — ALBUTEROL SULFATE 90 UG/1
4 AEROSOL, METERED RESPIRATORY (INHALATION) PRN
OUTPATIENT
Start: 2023-02-27

## 2023-01-30 RX ORDER — DIPHENHYDRAMINE HYDROCHLORIDE 50 MG/ML
50 INJECTION INTRAMUSCULAR; INTRAVENOUS
OUTPATIENT
Start: 2023-02-27

## 2023-01-30 RX ORDER — ONDANSETRON 2 MG/ML
8 INJECTION INTRAMUSCULAR; INTRAVENOUS
OUTPATIENT
Start: 2023-02-27

## 2023-01-30 RX ADMIN — GOSERELIN ACETATE 3.6 MG: 3.6 IMPLANT SUBCUTANEOUS at 15:38

## 2023-01-30 ASSESSMENT — PATIENT HEALTH QUESTIONNAIRE - PHQ9
1. LITTLE INTEREST OR PLEASURE IN DOING THINGS: 0
SUM OF ALL RESPONSES TO PHQ QUESTIONS 1-9: 0
SUM OF ALL RESPONSES TO PHQ QUESTIONS 1-9: 0
SUM OF ALL RESPONSES TO PHQ9 QUESTIONS 1 & 2: 0
SUM OF ALL RESPONSES TO PHQ QUESTIONS 1-9: 0
2. FEELING DOWN, DEPRESSED OR HOPELESS: 0
SUM OF ALL RESPONSES TO PHQ QUESTIONS 1-9: 0

## 2023-01-30 ASSESSMENT — ENCOUNTER SYMPTOMS
CONSTIPATION: 0
ABDOMINAL PAIN: 0
EYES NEGATIVE: 1
DIARRHEA: 0
WHEEZING: 0
CHEST TIGHTNESS: 0
BACK PAIN: 1
NAUSEA: 0
TROUBLE SWALLOWING: 0
COLOR CHANGE: 0
SHORTNESS OF BREATH: 0

## 2023-01-30 NOTE — PROGRESS NOTES
Arrived to the [facility]. Zoladex completed. Provided education on Zoladex    Patient instructed to report any side affects to ordering provider. Patient tolerated without complications. Any issues or concerns during appointment: NO.  Patient aware of next infusion appointment on 2/27/23 at 0930. Discharged ambulatory.

## 2023-01-30 NOTE — PATIENT INSTRUCTIONS
Patient Instructions from Today's Visit    Reason for Visit:  Follow up. Diagnosis Information:  [email or web address] net/about-us/asco-answers-patient-education-materials/gmmf-daorcxq-fgwz-sheets      Plan:  Continue aromasin and monthly zoladex. Follow Up:  Monthly zoladex. 3 months with labs.     Recent Lab Results:  Hospital Outpatient Visit on 01/30/2023   Component Date Value Ref Range Status    WBC 01/30/2023 5.6  4.3 - 11.1 K/uL Final    RBC 01/30/2023 4.54  4.05 - 5.2 M/uL Final    Hemoglobin 01/30/2023 14.0  11.7 - 15.4 g/dL Final    Hematocrit 01/30/2023 41.0  35.8 - 46.3 % Final    MCV 01/30/2023 90.3  82.0 - 102.0 FL Final    MCH 01/30/2023 30.8  26.1 - 32.9 PG Final    MCHC 01/30/2023 34.1  31.4 - 35.0 g/dL Final    RDW 01/30/2023 12.0  11.9 - 14.6 % Final    Platelets 67/03/7199 254  150 - 450 K/uL Final    MPV 01/30/2023 10.7  9.4 - 12.3 FL Final    nRBC 01/30/2023 0.00  0.0 - 0.2 K/uL Final    **Note: Absolute NRBC parameter is now reported with Hemogram**    Seg Neutrophils 01/30/2023 55  43 - 78 % Final    Lymphocytes 01/30/2023 31  13 - 44 % Final    Monocytes 01/30/2023 7  4.0 - 12.0 % Final    Eosinophils % 01/30/2023 6  0.5 - 7.8 % Final    Basophils 01/30/2023 1  0.0 - 2.0 % Final    Immature Granulocytes 01/30/2023 0  0.0 - 5.0 % Final    Segs Absolute 01/30/2023 3.1  1.7 - 8.2 K/UL Final    Absolute Lymph # 01/30/2023 1.7  0.5 - 4.6 K/UL Final    Absolute Mono # 01/30/2023 0.4  0.1 - 1.3 K/UL Final    Absolute Eos # 01/30/2023 0.3  0.0 - 0.8 K/UL Final    Basophils Absolute 01/30/2023 0.0  0.0 - 0.2 K/UL Final    Absolute Immature Granulocyte 01/30/2023 0.0  0.0 - 0.5 K/UL Final    Differential Type 01/30/2023 AUTOMATED    Final    Sodium 01/30/2023 142  133 - 143 mmol/L Final    Potassium 01/30/2023 4.1  3.5 - 5.1 mmol/L Final    Chloride 01/30/2023 107  101 - 110 mmol/L Final    CO2 01/30/2023 30  21 - 32 mmol/L Final    Anion Gap 01/30/2023 5  2 - 11 mmol/L Final    Glucose 01/30/2023 100  65 - 100 mg/dL Final    BUN 01/30/2023 10  6 - 23 MG/DL Final    Creatinine 01/30/2023 0.70  0.6 - 1.0 MG/DL Final    Est, Glom Filt Rate 01/30/2023 >60  >60 ml/min/1.73m2 Final    Comment:      Pediatric calculator link: [employer].at. org/professionals/kdoqi/gfr_calculatorped       These results are not intended for use in patients <25years of age. eGFR results are calculated without a race factor using  the 2021 CKD-EPI equation. Careful clinical correlation is recommended, particularly when comparing to results calculated using previous equations. The CKD-EPI equation is less accurate in patients with extremes of muscle mass, extra-renal metabolism of creatinine, excessive creatine ingestion, or following therapy that affects renal tubular secretion. Calcium 01/30/2023 9.5  8.3 - 10.4 MG/DL Final    Total Bilirubin 01/30/2023 0.2  0.2 - 1.1 MG/DL Final    ALT 01/30/2023 19  12 - 65 U/L Final    AST 01/30/2023 10 (A)  15 - 37 U/L Final    Alk Phosphatase 01/30/2023 81  50 - 136 U/L Final    Total Protein 01/30/2023 7.7  6.3 - 8.2 g/dL Final    Albumin 01/30/2023 4.4  3.5 - 5.0 g/dL Final    Globulin 01/30/2023 3.3  2.8 - 4.5 g/dL Final    Albumin/Globulin Ratio 01/30/2023 1.3  0.4 - 1.6   Final    HCG(Urine) Pregnancy Test 01/30/2023 Negative  NEG   Final         Treatment Summary has been discussed and given to patient: n/a        -------------------------------------------------------------------------------------------------------------------  Please call our office at [phone] if you have any  of the following symptoms:   Fever of 100.5 or greater  Chills  Shortness of breath  Swelling or pain in one leg    After office hours an answering service is available and will contact a provider for emergencies or if you are experiencing any of the above symptoms. Patient did express an interest in My Chart.   My Chart log in information explained on the after visit summary printout at the [facility] [address] desk.     Linda Maradiaga RN

## 2023-01-30 NOTE — PROGRESS NOTES
[facility]: Office Visit Established Patient    Chief Complaint   Patient presents with    Follow-up         Diagnosis: ER/LA/AR/Her+ right breast cancer    Right under implant/rib pain - same    S/p bilat mastectomies with recon at [facility] in [place] - path sent to scanning       History of Present Illness:   Ms. Maya Chou is a 33 y.o. female who returns today for management of breast cancer. The past medical history  is significant for asthma. She initially presented in August 2018 with a mass in her right lateral breast that had been present for approximately 8 years with no nipple discharge or skin changes. Initial workup, including right breast US on 8/27/18  identified a 3.5 cm mass with no suspicious axillary lymph nodes. On 9/11/2018 she underwent US guided core biopsy which identified IDC, grade 3, measuring 13 mm in maximal linear dimension. She saw genetics counselor for testing on 9/17/18 and  underwent egg collection and preservation. She subsequently underwent right partial mastectomy and sentinel lymph node biopsy on 9/24/18 which revealed stage IIb ([id number]), ER/LA/ AR+, HER-2 FISH positive, IDC at 9 o'clock. Surgical margins were  uninvolved by invasive carcinoma, however, DCIS was present at the lateral margin focally, 3 mm, focus of DCIS was also found 1 mm from deep margin and 2 mm from inferior margin. Micrometastasis measuring 13 mm was also found in 1/9 lymph nodes. No  micrometastatic disease was appreciated. She was then seen by Dr. Mikey Rubi, Oncologist at [facility] in [place], and on 10/22/18, she started C1 adjuvant TCHP. She developed neutropenic fever requiring hospitalization from  12/9/18 - 12/12/18 after C3. No source was found, but viral URI was presumed. C4 was delayed one week due to 70K platelet count. C4 was administered on 12/31/18 with carbo dose reduced to 700 mg (AUC 4.85).   Plan was to completed 6 cycles  of TCHP, with continuation of dual Ab therapy every 3 weeks for a total of one year if tolerated, as well as, starting endocrine therapy (tamoxifen vs ovarian suppression), timing with possible future surgery and radiation. Patient was last seen by  Dr. Belle Neal on 1/21/19, and started cycle 5 of treatment the same day. She is due to return for cycle six on 2/11/19 and will complete TCHP in [place]. She relocated to the [facility] and was seeking to establish local oncology care. S/p CT chest 6/2022 negative for any pulmonary disease. Family Hx: unspecified type of cancer in paternal aunt. There is no family history of ovarian, breast or colon cancer   Social Hx: has a SO, moving to [place], no children, s/p egg retrieval/preservation, non-smoker, drinks 2 beers/2 glasses wine/wk     Today, she is here for follow-up and next Zoladex and continues on Aromasin. No new issues or concerns. Under Right breast area . She wishes to remain on the same schedule as we decided upon last time. Her MRI will be in April. She did see her plastic surgeon at [facility] in the winter and there has been some migration of her implant laterally, however, shy of doing another surgery there is no noninvasive intervention possible. She does not wish to proceed with any surgery at this time. We discussed the POSITIVE trial briefly looking at holding endocrine therapy if she were to want to get pregnant. She is not interested at this time. Of note, Laurel Buchanan recently had knee surgery and he is still recovering from that. She has not tried any of the interventions we discussed at last time regarding dyspareunia, but plans to.       Chronological Events:   8/27/2018 right breast ultrasound: 3.5 cm mass with no suspicious axillary lymphadenopathy   9/11/2018 ultrasound-guided core biopsy: IDC, grade 3, %, PR60%, AR +3 positive, HER-2 FISH positive   9/17/2018 saw genetics counselor and underwent a collection/preservation 9/24/2018 right partial mastectomy and sentinel lymph node biopsy: IDC 3.4cm, ER AK AR positive, HER-2 FISH positive, 1/9 LN w/ macromets: [id number] stage IIB, surgical margins clear from invasive carcinoma, invasive carcinoma 0.5mm from deep,  1mm from lateral, 2mm fro anterior margins. Lateral margin + DCIS (for 3 mm), 1mm from deep and 2mm from inferior margins, others >2mm. 9/27/18 MRI breast: resection cavity, dense grandular tissue, no residual mass or abnormal enhancement. No adenopathy. 10/22/18 C1 TCHP   11/12/18 C2 TCHP    12/3/18 C3 TCHP    12/9/18 - 12/12/18 admitted for neutropenic fever after cycle 3   12/31/18 C4 delayed due to thrombocytopenia by 1 week, carboplatin dose adjusted to 700 mg (AUC 4.85).   1/21/19 C5 TCHP    2/4/19 oncology consultation   2/11/19 cycle 6 planned in [place]   5/6/19 f/u after moving from [place] [id number], continuing HP    6/17/19 f/u HP, undergoing XRT with Dr Priscila Narayanan (16/25)   7/29/19 completed XRT and doing well, here for continued HP    8/27/19 echo - EF preserved   9/9/19 f/u; reviewed echo, Effexor Rx, plan to see [facility] soon; left VM for Dr Windy Tee   10/21/19 follow up and last HP   10/29 port removed    11/20 Surgery at [facility] - completion mastectomies with recon    12/9/19 f/u doing well; no acute issues/concerns, to f/u with dr Nehemias Solis for tissue expansion after [place] visit. Tamoxifen. On venlafaxine for hot flashes. Also will be starting OS with goserelin upon return from [place].     1/6/2020 here for follow-up and first goserelin injection. Continue with endocrine therapy. DEXA results reviewed and low normal-vitamin D and calcium to continue. Echo results reviewed 55 to 60%. Continue with tissue expansion as recommended  by plastics. Continue with venlafaxine for hot flashes. Tissue expander leakage - exchanged implant in [place]    3/30/20 fu; goserelin; switch to AI per SOFT/TEXT data; Ativan refill for insomnia. 6/22/2020 here for follow up. Doing well. On Arimidex and Zoladex. No new issues. 7/20/20 Zoladex inj    8/13/20 FU for SE management; Rx exemestane; hip/pelvis xrays   8/17/20 Zoladex inj    9/21/20 Zoladex inj    10/19/20 FU doing ok on exemestane and Zoladex; Rx Ativan. Flu vaccine;  re counseling resources    11/4/20 paragard IUD insertion    11/16/20 Zoladex    11/25/20 reviewed case with rad - p/w CT chest    12/11/20 CT chest - EXPECTED POSTOPERATIVE APPEARANCE OF THE CHEST WITH NO DEFINITE SOFT TISSUE OR BONY ABNORMALITY TO ACCOUNT FOR THE PERSISTENT PAIN AT THE INFEROLATERAL MARGIN OF  THE RIGHT BREAST RECONSTRUCTION. IF FURTHER IMAGING EVALUATION IS CLINICALLY INDICATED AT THIS TIME, THEN BREAST MRI WOULD BE MOST USEFUL    12/14/20 FU ET chest reviewed. We will proceed with MRI per radiology recommendations to evaluate the area further. Continue with Zoladex/exemestane. Has been in touch with  and community resources. 12/23/20 MRI - Bilateral implant contours appear intact. There is mild parenchymal enhancement bilaterally. However, there is ill-defined enhancement extending   over approximately 4 cm along the posterolateral margin of the right breast implant from approximately 7:00 to 8:00 which is asymmetric compared with the postoperative breasts elsewhere bilaterally. This asymmetric enhancement is in the area of focal  pain identified by the patient and marked with a metallic BB at the time of chest CT on December 11, 2020. could be postop reactive changes. No pathologically enlarged or dysmorphic lymph nodes are seen. The liver, lungs, and chest wall appear unremarkable  as imaged, accounting for artifact at the right cardiophrenic angle on STIR images 15 and 16 which may be associated with field inhomogeneity and/or diaphragmatic motion. Rec mammo/US.     1/5/21 mammo - Very limited mammogram assessment without clearly worrisome findings.  Prior enhancement at the chest wall cannot be reassessed by mammography. Second Look US rec: Inability to further assess right chest wall enhancement by mammogram and  no clearly defined mass by ultrasound although only a Limited imaging window could be obtained. The prior changes were therefore much better assessed by breast   MRI. Based on the prior recommendations, a PET scan is recommended for further assessment which I also agree is the most appropriate next step. 1/13/21 PET - No FDG avid focus identified. 2/8/21 FU doing well, recent imaging reviewed and TRU. Rec short interval MRI (best exam for her case per rad). [email or web address] reviewing her imaging too for second opinion. Low vit B12/low norm vit D - will adjust dosing and rec replacement. 2/22/21 B12 inj    3/1/21 B12 inj    3/8/21 B12 and Zoladex    3/15/21 B12 inj    4/8/21 MRi breast - IMPRESSION   1. Improving enhancement at the level of right breast pain as described above. This along with interval negative PET scan favors benign changes. No evolving worrisome enhancement is seen on today's breast MRI. Specifically, no evidence for evolving  breast malignancy or metastatic disease is seen. Routine follow-up   is therefore recommended with the patient's next screening mammogram to occur in January 2022, unless clinically indicated sooner. 4/12/21 FU doing well; MRI reviewed; labs reviewed. B12 >500, vit D 39    7/13/21 FU doing well. No new issues/concerns. Monitor LN above left breast for changes. 10/2021 DEXA in Dec, MRI in April, c/w AI/zoladex; US of left chest above implant, c/w vit D   10/20/21 US left breast - Normal left breast ultrasound. Clinical management of any palpable   abnormality is recommended. Consider future follow up screening breast MRI, as   recommended on prior breast MRI April 2021.   12/14/21 DEXA - Bone mineral density is within expected range for age. 1/3/22 FU - AI/Zoladex.   Increased hot flashes - increase Effexor to 112.5 mg (3 capsules) daily and reassess. Recent COVID + and just prior booster. Labs reviewed. 4/2022 FU after MR - results reviewed; discussed DEXA; CT chest per [facility]   6/20/22 CT chest - no evidence of pulmonary metastatic       7/18/22 FU - doing well, p/w Zoladex monthly and Aromasin. 10/2022 FU doing well; c/w Zoladex monthly and Aromasin; discussed sexual health  1/30/23 FU doing ok; c/w tx. MR in April      Review of Systems   Constitutional:  Positive for diaphoresis (better). Negative for activity change, appetite change, chills, fatigue, fever and unexpected weight change. HENT:  Negative for congestion, mouth sores and trouble swallowing. Eyes: Negative. Respiratory:  Negative for chest tightness, shortness of breath and wheezing. Cardiovascular:  Negative for chest pain, palpitations and leg swelling. Gastrointestinal:  Negative for abdominal pain, constipation, diarrhea and nausea. Genitourinary:  Negative for dysuria, hematuria and urgency. Musculoskeletal:  Positive for arthralgias and back pain. Skin:  Negative for color change, pallor and rash. Neurological:  Negative for dizziness, weakness and numbness. Hematological:  Negative for adenopathy. Does not bruise/bleed easily. Psychiatric/Behavioral:  Negative for suicidal ideas. The patient is not nervous/anxious.           No Known Allergies    Past Medical History:   Diagnosis Date    Anxiety     Asthma     Breast cancer ([facility] 2019    Cancer ([facility] [address] 09/2018    stage 2 triple positive ductal breast cancer     Past Surgical History:   Procedure Laterality Date    BIOPSY OF BREAST, NEEDLE CORE Right 09/2018    BREAST LUMPECTOMY  09/2018    BREAST SURGERY      IMPLANT BREAST SILICONE/EQ  59/0803    IR REMOVE TUNNELED VAD W PORT  10/29/2019    MASTECTOMY Bilateral 11/2019    ORTHOPEDIC SURGERY Left 06/2010    knee surgery    ORTHOPEDIC SURGERY Right 06/2009    knee surgery     Family History   Problem Relation Age of Onset Breast Cancer Paternal Aunt 64    Cancer Mother         skin cancer    Cancer Father         skin cancer    Cancer Paternal Aunt         breast cancer     Social History     Socioeconomic History    Marital status:      Spouse name: Not on file    Number of children: Not on file    Years of education: Not on file    Highest education level: Not on file   Occupational History    Not on file   Tobacco Use    Smoking status: Never    Smokeless tobacco: Never   Substance and Sexual Activity    Alcohol use: Yes     Alcohol/week: 4.0 standard drinks    Drug use: No    Sexual activity: Not on file   Other Topics Concern    Not on file   Social History Narrative    Not on file     Social Determinants of Health     Financial Resource Strain: Not on file   Food Insecurity: Not on file   Transportation Needs: Not on file   Physical Activity: Not on file   Stress: Not on file   Social Connections: Not on file   Intimate Partner Violence: Not on file   Housing Stability: Not on file     Current Outpatient Medications   Medication Sig Dispense Refill    hydrOXYzine HCl (ATARAX) 10 MG tablet       Vitamin D, Cholecalciferol, 50 MCG (2000 UT) CAPS Take by mouth      albuterol sulfate  (90 Base) MCG/ACT inhaler Inhale into the lungs as needed      calcium carb-cholecalciferol 600-400 MG-UNIT TABS per tab Take 1 tablet by mouth daily      Paragard Intrauterine Copper IUD by IntraUTERine route      LORazepam (ATIVAN) 1 MG tablet Take 1 mg by mouth every 8 hours as needed. venlafaxine (EFFEXOR XR) 37.5 MG extended release capsule Take 112 mg by mouth daily      exemestane (AROMASIN) 25 MG tablet TAKE 1 TABLET BY MOUTH EVERY DAY 90 tablet 3     No current facility-administered medications for this visit.       OBJECTIVE:  Visit Vitals  Vitals:    01/30/23 1432   BP: 131/73   Pulse: 60   Resp: 14   Temp: 98.6 °F (37 °C)   TempSrc: Oral   SpO2: 100%   Weight: 161 lb (73 kg)   Height: 5' 5\" (1.651 m)          ECOG PERFORMANCE STATUS - 0-Fully active, able to carry on all pre-disease performance without restriction. Pain - 0 - No pain/10. None/Minimal pain - not affecting QOL     Fatigue - No flowsheet data found. Distress - No flowsheet data found. Physical Exam  Constitutional:       General: She is not in acute distress. Appearance: Normal appearance. HENT:      Head: Normocephalic and atraumatic. Nose: No congestion. Mouth/Throat:      Mouth: Mucous membranes are moist.   Eyes:      General: No scleral icterus. Extraocular Movements: Extraocular movements intact. Conjunctiva/sclera: Conjunctivae normal.   Cardiovascular:      Rate and Rhythm: Normal rate and regular rhythm. Pulses: Normal pulses. Heart sounds: Normal heart sounds. Pulmonary:      Effort: Pulmonary effort is normal. No respiratory distress. Breath sounds: Normal breath sounds. Chest:      Comments: no palpable abnormality on right   Small (<1 cm) nodule above left implant--unchanged. No axillary LAD    S/p bilat mastectomies with recon   Abdominal:      General: Abdomen is flat. There is no distension. Musculoskeletal:         General: No swelling. Cervical back: Normal range of motion and neck supple. Lymphadenopathy:      Cervical: No cervical adenopathy. Skin:     General: Skin is warm and dry. Neurological:      General: No focal deficit present. Mental Status: She is alert and oriented to person, place, and time. Motor: No weakness.    Psychiatric:         Mood and Affect: Mood normal.         Behavior: Behavior normal.          Labs:  Hospital Outpatient Visit on 01/30/2023   Component Date Value Ref Range Status    WBC 01/30/2023 5.6  4.3 - 11.1 K/uL Final    RBC 01/30/2023 4.54  4.05 - 5.2 M/uL Final    Hemoglobin 01/30/2023 14.0  11.7 - 15.4 g/dL Final    Hematocrit 01/30/2023 41.0  35.8 - 46.3 % Final    MCV 01/30/2023 90.3  82.0 - 102.0 FL Final    [facility] 01/30/2023 30.8  26.1 - 32.9 PG Final    MCHC 01/30/2023 34.1  31.4 - 35.0 g/dL Final    RDW 01/30/2023 12.0  11.9 - 14.6 % Final    Platelets 83/24/0295 254  150 - 450 K/uL Final    MPV 01/30/2023 10.7  9.4 - 12.3 FL Final    nRBC 01/30/2023 0.00  0.0 - 0.2 K/uL Final    **Note: Absolute NRBC parameter is now reported with Hemogram**    Seg Neutrophils 01/30/2023 55  43 - 78 % Final    Lymphocytes 01/30/2023 31  13 - 44 % Final    Monocytes 01/30/2023 7  4.0 - 12.0 % Final    Eosinophils % 01/30/2023 6  0.5 - 7.8 % Final    Basophils 01/30/2023 1  0.0 - 2.0 % Final    Immature Granulocytes 01/30/2023 0  0.0 - 5.0 % Final    Segs Absolute 01/30/2023 3.1  1.7 - 8.2 K/UL Final    Absolute Lymph # 01/30/2023 1.7  0.5 - 4.6 K/UL Final    Absolute Mono # 01/30/2023 0.4  0.1 - 1.3 K/UL Final    Absolute Eos # 01/30/2023 0.3  0.0 - 0.8 K/UL Final    Basophils Absolute 01/30/2023 0.0  0.0 - 0.2 K/UL Final    Absolute Immature Granulocyte 01/30/2023 0.0  0.0 - 0.5 K/UL Final    Differential Type 01/30/2023 AUTOMATED    Final    Sodium 01/30/2023 142  133 - 143 mmol/L Final    Potassium 01/30/2023 4.1  3.5 - 5.1 mmol/L Final    Chloride 01/30/2023 107  101 - 110 mmol/L Final    CO2 01/30/2023 30  21 - 32 mmol/L Final    Anion Gap 01/30/2023 5  2 - 11 mmol/L Final    Glucose 01/30/2023 100  65 - 100 mg/dL Final    BUN 01/30/2023 10  6 - 23 MG/DL Final    Creatinine 01/30/2023 0.70  0.6 - 1.0 MG/DL Final    Est, Glom Filt Rate 01/30/2023 >60  >60 ml/min/1.73m2 Final    Comment:      Pediatric calculator link: [email or web address].at. org/professionals/kdoqi/gfr_calculatorped       These results are not intended for use in patients <25years of age. eGFR results are calculated without a race factor using  the 2021 CKD-EPI equation. Careful clinical correlation is recommended, particularly when comparing to results calculated using previous equations.   The CKD-EPI equation is less accurate in patients with extremes of muscle mass, extra-renal metabolism of creatinine, excessive creatine ingestion, or following therapy that affects renal tubular secretion. Calcium 01/30/2023 9.5  8.3 - 10.4 MG/DL Final    Total Bilirubin 01/30/2023 0.2  0.2 - 1.1 MG/DL Final    ALT 01/30/2023 19  12 - 65 U/L Final    AST 01/30/2023 10 (A)  15 - 37 U/L Final    Alk Phosphatase 01/30/2023 81  50 - 136 U/L Final    Total Protein 01/30/2023 7.7  6.3 - 8.2 g/dL Final    Albumin 01/30/2023 4.4  3.5 - 5.0 g/dL Final    Globulin 01/30/2023 3.3  2.8 - 4.5 g/dL Final    Albumin/Globulin Ratio 01/30/2023 1.3  0.4 - 1.6   Final    Vit D, 25-Hydroxy 01/30/2023 34.3  30.0 - 100.0 ng/mL Final    HCG(Urine) Pregnancy Test 01/30/2023 Negative  NEG   Final            Pathology:        RIGHT BREAST/AXILLA ULTRASOUND 8/27/18   FINDINGS:             SURGICAL PATHOOGY 9/11/2018                 SURGICAL PATHOLOGY 9/24/2018                           MRI BREAST B/L W CONTRAST 9/27/2018   FINDINGS:                   ASSESSMENT:    ICD-10-CM    1. Malignant neoplasm of breast in female, estrogen receptor positive, unspecified laterality, unspecified site of breast ([facility]  C50.919 CBC with Auto Differential    Z17.0 Comprehensive Metabolic Panel     Pregnancy, Urine              PLAN:   Ms Faith Urias is a 28yo woman with triple positive breast cancer s/p surgery and TCHP. She transferred care for continued HP after moving to [place] from [place]. She is here for follow up.  s/p recon at [facility] with local tissue expansion.           1. Right breast IDC 9:00 %/PR60%AR+3 (positive) /Her2 + by FISH, 3.5cm mass by US, s/p partial mastectomy, 1/9 LN with macromets - [id number]   - completed TCHP in [place] (Dr Jose Mckeon), then on maintenance HP (started in 10/2018)   - s/p XRT with Dr Anisa Hammond    - s/p completion bilat mastectomies with recon surgery at [facility] 11/20/19 - per pt (will get op/path reports - RN aware)   - elected to proceed with Tamoxifen with OS perioperatively    - switch to anastrozole/OS (goserelin) 3/2020    - switch to exemestane 8/2020/OS      - here for follow-up and next Zoladex and continues on Aromasin. She has been doing well overall.    - We discussed sexual health and ways to improve side effects of vaginal dryness as well as painful intercourse. She can try vaginal dilators and will also try a different lubricant and vaginal moisturizer. Se has not tried any of these interventions yet. - PLan for MR in April. No new issues/concerns. No new lumps/masses. No wt loss. Continues to stay active and working.     - Previously, We discussed her DEXA results reviewing statistics behind the testing and how to interpret this in her case. Most likely will transition to stronger bone strengthener at next DEXA. For now she will continue with vitamin D. We will check vitamin D  levels to see if the current dose is adequate. - hot flashes - on venlafaxine    - mood - improved on venlafaxine   - echo results previously reviewed - normal EF      - She can use turmeric and chondroitin. RTC in ~3-4mo or sooner as needed   C/w monthly Zoladex inj       All questions were asked and answered to the best of my ability. The patient verbalized understanding and agrees with the plan above.          MDM  Number of Diagnoses or Management Options  Aromatase inhibitor use: established, improving  Hot flashes related to aromatase inhibitor therapy: established, improving  Malignant neoplasm of breast in female, estrogen receptor positive, unspecified laterality, unspecified site of breast ([facility] [address]): established, improving     Amount and/or Complexity of Data Reviewed  Clinical lab tests: ordered and reviewed  Tests in the radiology section of CPT®: ordered and reviewed  Tests in the medicine section of CPT®: ordered  Review and summarize past medical records: yes  Independent visualization of images, tracings, or specimens: yes    Risk of Complications, Morbidity, and/or Mortality  Presenting problems: moderate  Diagnostic procedures: moderate  Management options: moderate        Historical:    - we discussed the pathophysiology of breast cancer, staging, and the importance of receptor status in terms of treatment options. We then reviewed her medical history as well as oncologic history, recent imaging and pathology in details; plan to complete  TCHP in [place]. She is also going to go back for her surgical interventions to [place] later this year; referred to XRT and we discussed endocrine therapy and SE. She will start that after XRT. - Previously had a discussion regarding addition of ovarian function suppression/ablation to either AI or tamoxifen as it modestly improves the risk of recurrence but increases toxicity in women with ER+ disease. We reviewed the SOFT and TEXT trial data  in details. In the SOFT Trial >3K women were assigned to 1) tamoxifen alone, 2) stahl/OFS, 3) exemestane/ OFS. In TEXT [place] >2600 women were assigned to stahl/ovaraian suppression or exemestane w. Ovarian suppression. Compared with stahl alone, OFS improved  8 year DFS from 79 to 83 (stahl)-86% (exemestane). - women who can be considered at higher risk for recurrence include those who qualify for chemotherapy, age <35, node positive disease, larger tumor size, high tumor grade, LVI and/or high risk recurrence score on genomic assay, and on case-by-case basis. For women who are not at high risk of recurrence and over 35, tamoxifen alone can be started rather than OFS with endocrine therapy. Moreover, although women with Her2 + disease were included in the trial the analysis is complicated. Only ~60% of Her2  + pts in SOFT and TEXT trails got trastuzumab.   Today, we have a number of txs available to women with Her2 positive disease and it seems rather unlikely that ovarian suppression will be evaluated in this pt population in a large clinical trial.  In addition,  women with Her2 positive disease have great prognosis after chemotherapy/available targeted therapies with standard endocrine therapy. She will think about these options and let me know how she'd like to proceed. She has decided to proceed with Tamoxifen  with ovarian suppression.     - plan to switch to AI with OS now; completed surgical interventions - s/p implant exchange ~3wks ago. Continues to have hot flashes on [place].  Switching to anastrozole with monthly OS - stressed  importance of OS need while on AI and premenopausal.  Anastrozole can cause hot flashes, HTN, angina, swelling, fatigue, bone thinning leading to osteoporosis/fractures, joint stiffness, N/V, hair thinning, weight changes, thrombosis and worsening depression  to name a few. Printed info given to pt for her reference. MOA reviewed. - pain under right implant/over ribs - relatively stable; s/p CT/MRI/mammo/US and PET. MRI with some enhancement adj to implant - could be post-surgical.  Reviewed in tumor board  and felt to be benign. No worrisome findings on subsequent mammo/US and PET. Now improvement noted on current MRI further suggesting benign finding.     - small (<1cm) nodule above left implant - felt over ribs when pt raises her arm - will be eval on MRI, ? LN - not noted on MRI--stable from previous and unchanged per patient. - easy bruising - mostly over LE - will check nutritional labs : iron and B12; previous results showed B12 defic - s/p 4x B12 inj with sig improvement in counts, can continue with oral supplementation,  can also start vit C daily; checked PFA-100 - pending.     - Most recent MRI reviewed with the patient we will proceed with CT scan per her plastic surgeon from [facility] recommendation; CT chest 6/20/22 - negative for pulmonary disease. menses - no menses for a few months.   Discussed use of IUD, reviewed safe vaginal lubricants   - previously referred to Dr Washington Kat per request; Cu++ IUD inserted       Lab studies and imaging studies were personally reviewed. Pertinent old records were reviewed.            Sherly Gandhi MD, MD  [facility] Hematology and Oncology  [address] [place] [address]  Office : [phone]  Fax : [phone]

## 2023-01-31 RX ORDER — EXEMESTANE 25 MG/1
TABLET ORAL
Qty: 90 TABLET | Refills: 3 | Status: SHIPPED | OUTPATIENT
Start: 2023-01-31 | End: 2023-02-01 | Stop reason: SDUPTHER

## 2023-02-01 DIAGNOSIS — C50.411 MALIGNANT NEOPLASM OF UPPER-OUTER QUADRANT OF RIGHT FEMALE BREAST (HCC): ICD-10-CM

## 2023-02-01 RX ORDER — EXEMESTANE 25 MG/1
TABLET ORAL
Qty: 90 TABLET | Refills: 3 | Status: SHIPPED | OUTPATIENT
Start: 2023-02-01

## 2023-02-06 DIAGNOSIS — Z90.13 HX OF BILATERAL MASTECTOMY: Primary | ICD-10-CM

## 2023-02-27 ENCOUNTER — HOSPITAL ENCOUNTER (OUTPATIENT)
Dept: INFUSION THERAPY | Age: 33
Discharge: HOME | End: 2023-02-27
Payer: COMMERCIAL

## 2023-02-27 VITALS
RESPIRATION RATE: 16 BRPM | OXYGEN SATURATION: 98 % | SYSTOLIC BLOOD PRESSURE: 127 MMHG | TEMPERATURE: 98.2 F | DIASTOLIC BLOOD PRESSURE: 87 MMHG | HEART RATE: 63 BPM

## 2023-02-27 LAB — HCG UR QL: NEGATIVE

## 2023-02-27 PROCEDURE — 6360000002 HC RX W HCPCS: Performed by: INTERNAL MEDICINE

## 2023-02-27 PROCEDURE — 96402 CHEMO HORMON ANTINEOPL SQ/IM: CPT

## 2023-02-27 PROCEDURE — 81025 URINE PREGNANCY TEST: CPT

## 2023-02-27 RX ORDER — EPINEPHRINE 1 MG/ML
0.3 INJECTION, SOLUTION, CONCENTRATE INTRAVENOUS PRN
OUTPATIENT
Start: 2023-03-27

## 2023-02-27 RX ORDER — SODIUM CHLORIDE 9 MG/ML
INJECTION, SOLUTION INTRAVENOUS CONTINUOUS
OUTPATIENT
Start: 2023-03-27

## 2023-02-27 RX ORDER — ACETAMINOPHEN 325 MG/1
650 TABLET ORAL
OUTPATIENT
Start: 2023-03-27

## 2023-02-27 RX ORDER — DIPHENHYDRAMINE HYDROCHLORIDE 50 MG/ML
50 INJECTION INTRAMUSCULAR; INTRAVENOUS
OUTPATIENT
Start: 2023-03-27

## 2023-02-27 RX ORDER — ALBUTEROL SULFATE 90 UG/1
4 AEROSOL, METERED RESPIRATORY (INHALATION) PRN
OUTPATIENT
Start: 2023-03-27

## 2023-02-27 RX ORDER — ONDANSETRON 2 MG/ML
8 INJECTION INTRAMUSCULAR; INTRAVENOUS
OUTPATIENT
Start: 2023-03-27

## 2023-02-27 RX ADMIN — GOSERELIN ACETATE 3.6 MG: 3.6 IMPLANT SUBCUTANEOUS at 10:33

## 2023-02-27 NOTE — PROGRESS NOTES
Arrived to the [facility]. Urine pregnancy negative confirmed, SQ Zoladex injection completed. Provided education on Zoladex; pt has had medication before. Patient instructed to report any side effects to ordering provider. Patient tolerated well. Any issues or concerns during appointment: none. Patient aware of next infusion appointment on 3/27/23 (date) at 3 PM (time). Discharged ambulatory.

## 2023-02-28 ASSESSMENT — ENCOUNTER SYMPTOMS
SHORTNESS OF BREATH: 0
VOMITING: 0
COUGH: 0
NAUSEA: 0
ABDOMINAL PAIN: 0
DIARRHEA: 0

## 2023-02-28 NOTE — PROGRESS NOTES
[address], DO  [address] [facility], [address] [address], [address]  [phone]         ASSESSMENT AND PLAN    Problem List Items Addressed This Visit          Circulatory    Hot flashes related to aromatase inhibitor therapy - Primary     Worse since coming off of the Effexor, patient states that they are more frequent and are stronger than before. Has only been off of the Effexor for the last two weeks, so after discussing options will wait for another month for considering another medication, such as Fluoxetine, Clonidine or a lower dose of Effexor. Recheck in 4 weeks. Other    Vitamin D deficiency     History of low Vitamin D before, will recheck level today, patient taking daily supplementation. Relevant Orders    Vitamin D 25 Hydroxy    Easy bruising    Low serum vitamin B12     Will recheck B12 level today. Relevant Orders    Vitamin B12    At risk for osteoporosis    Malignant neoplasm of female breast ([facility] [address]     Stable, CT scheduled in April for follow up, still doing Aromasin and Zoladex infusions. CMP and CBC scheduled per Oncology. Will continue to follow. Other Visit Diagnoses       Screening for lipid disorders        Relevant Orders    Lipid Panel    Screening for thyroid disorder        Relevant Orders    TSH             The diagnoses and plan were discussed with the patient, who verbalizes understanding and agrees with plan. All questions answered. Chief Complaint    Chief Complaint   Patient presents with    Follow-up       HISTORY OF PRESENT ILLNESS    33 y.o. female with breast cancer presents today for follow up. Last seen by me three months ago to establish care, is following with Dr. Hermila Toussaint for Hx breast cancer s/p bilateral mastectomies, still getting Aromasin and Zoladex infusions.   Wanted to wean off of her Effexor due to no improvement with her menopausal symptoms but states that her Psychiatrist was hesitant to do so, so we worked on a weaning schedule for patient. States that she is doing well in terms of breast cancer, CT scheduled in April. States that she weaned off of the Effexor and notes that she has more frequent, \"awful\" hot flashes, maybe 8-10 per day. Has been off completely for the last two weeks. PAST MEDICAL HISTORY    Past Medical History:   Diagnosis Date    Anxiety     Asthma     Breast cancer ([facility] 2019    Cancer ([facility] 09/2018    stage 2 triple positive ductal breast cancer       PAST SURGICAL HISTORY    Past Surgical History:   Procedure Laterality Date    BIOPSY OF BREAST, NEEDLE CORE Right 09/2018    BREAST LUMPECTOMY  09/2018    BREAST SURGERY      IMPLANT BREAST SILICONE/EQ  25/5641    IR REMOVE TUNNELED VAD W PORT  10/29/2019    MASTECTOMY Bilateral 11/2019    ORTHOPEDIC SURGERY Left 06/2010    knee surgery    ORTHOPEDIC SURGERY Right 06/2009    knee surgery       FAMILY HISTORY    Family History   Problem Relation Age of Onset    Breast Cancer Paternal Aunt 64    Cancer Paternal Aunt         breast cancer    Cancer Mother         skin cancer    Cancer Father         skin cancer    Cancer Maternal Grandfather        SOCIAL HISTORY    Social History     Socioeconomic History    Marital status:      Spouse name: None    Number of children: None    Years of education: None    Highest education level: None   Tobacco Use    Smoking status: Never    Smokeless tobacco: Never   Substance and Sexual Activity    Alcohol use:  Yes     Alcohol/week: 2.0 standard drinks     Types: 2 Glasses of wine per week    Drug use: No    Sexual activity: Yes     Partners: Male     Birth control/protection: Condom       MEDICATIONS      Current Outpatient Medications:     exemestane (AROMASIN) 25 MG tablet, TAKE 1 TABLET BY MOUTH EVERY DAY, Disp: 90 tablet, Rfl: 3    hydrOXYzine HCl (ATARAX) 10 MG tablet, , Disp: , Rfl:     Vitamin D, Cholecalciferol, 50 MCG (2000 UT) CAPS, Take by mouth, Disp: , Rfl:     albuterol sulfate  (90 Base) MCG/ACT inhaler, Inhale into the lungs as needed, Disp: , Rfl:     calcium carb-cholecalciferol 600-400 MG-UNIT TABS per tab, Take 1 tablet by mouth daily, Disp: , Rfl:     Paragard Intrauterine Copper IUD, by IntraUTERine route, Disp: , Rfl:     LORazepam (ATIVAN) 1 MG tablet, Take 1 mg by mouth every 8 hours as needed. , Disp: , Rfl:     venlafaxine (EFFEXOR XR) 37.5 MG extended release capsule, Take 112 mg by mouth daily, Disp: , Rfl:     ALLERGIES / INTOLERANCES    No Known Allergies    REVIEW OF SYSTEMS    Review of Systems   Constitutional:  Negative for fever. HENT:  Negative for congestion. Respiratory:  Negative for cough and shortness of breath. Cardiovascular:  Negative for chest pain. Gastrointestinal:  Negative for abdominal pain, diarrhea, nausea and vomiting. Psychiatric/Behavioral:  Negative for dysphoric mood. PHYSICAL EXAMINATION    Vitals:    03/01/23 0904   BP: 116/70   Pulse: 56   Resp: 16   SpO2: 97%         Physical Exam  Vitals and nursing note reviewed. Constitutional:       General: She is not in acute distress. Appearance: Normal appearance. HENT:      Head: Normocephalic and atraumatic. Right Ear: External ear normal.      Left Ear: External ear normal.      Nose: Nose normal.   Eyes:      Extraocular Movements: Extraocular movements intact. Pupils: Pupils are equal, round, and reactive to light. Cardiovascular:      Rate and Rhythm: Normal rate and regular rhythm. Heart sounds: Normal heart sounds. No murmur heard. Pulmonary:      Effort: Pulmonary effort is normal. No respiratory distress. Breath sounds: Normal breath sounds. Abdominal:      General: Bowel sounds are normal.      Palpations: Abdomen is soft. Tenderness: There is no abdominal tenderness. There is no right CVA tenderness or left CVA tenderness. Musculoskeletal:         General: Normal range of motion. Cervical back: Normal range of motion. Skin:     General: Skin is warm. Findings: No rash. Neurological:      General: No focal deficit present. Mental Status: She is alert.    Psychiatric:         Mood and Affect: Mood normal.         Behavior: Behavior normal.           RESULTS    Lab Results   Component Value Date     01/30/2023    K 4.1 01/30/2023     01/30/2023    CO2 30 01/30/2023    BUN 10 01/30/2023    CREATININE 0.70 01/30/2023    GLUCOSE 100 01/30/2023    CALCIUM 9.5 01/30/2023    PROT 7.7 01/30/2023    LABALBU 4.4 01/30/2023    BILITOT 0.2 01/30/2023    ALKPHOS 81 01/30/2023    AST 10 (L) 01/30/2023    ALT 19 01/30/2023    LABGLOM >60 01/30/2023    GFRAA >60 07/18/2022    AGRATIO 1.3 04/25/2022    GLOB 3.3 01/30/2023       Lab Results   Component Value Date    WBC 5.6 01/30/2023    HGB 14.0 01/30/2023    HCT 41.0 01/30/2023    MCV 90.3 01/30/2023     01/30/2023     Lab Results   Component Value Date/Time    VITD25 34.3 01/30/2023 02:22 PM          Darlene Almazan DO  11:09 AM  03/01/23

## 2023-03-01 ENCOUNTER — OFFICE VISIT (OUTPATIENT)
Dept: PRIMARY CARE CLINIC | Facility: CLINIC | Age: 33
End: 2023-03-01
Payer: COMMERCIAL

## 2023-03-01 VITALS
HEIGHT: 65 IN | HEART RATE: 56 BPM | BODY MASS INDEX: 26.86 KG/M2 | DIASTOLIC BLOOD PRESSURE: 70 MMHG | SYSTOLIC BLOOD PRESSURE: 116 MMHG | OXYGEN SATURATION: 97 % | RESPIRATION RATE: 16 BRPM | WEIGHT: 161.2 LBS

## 2023-03-01 DIAGNOSIS — T45.1X5A HOT FLASHES RELATED TO AROMATASE INHIBITOR THERAPY: Primary | ICD-10-CM

## 2023-03-01 DIAGNOSIS — Z13.29 SCREENING FOR THYROID DISORDER: ICD-10-CM

## 2023-03-01 DIAGNOSIS — E53.8 LOW SERUM VITAMIN B12: ICD-10-CM

## 2023-03-01 DIAGNOSIS — Z13.220 SCREENING FOR LIPID DISORDERS: ICD-10-CM

## 2023-03-01 DIAGNOSIS — C50.919 MALIGNANT NEOPLASM OF FEMALE BREAST, UNSPECIFIED ESTROGEN RECEPTOR STATUS, UNSPECIFIED LATERALITY, UNSPECIFIED SITE OF BREAST (HCC): ICD-10-CM

## 2023-03-01 DIAGNOSIS — Z91.89 AT RISK FOR OSTEOPOROSIS: ICD-10-CM

## 2023-03-01 DIAGNOSIS — E55.9 VITAMIN D DEFICIENCY: ICD-10-CM

## 2023-03-01 DIAGNOSIS — R23.3 EASY BRUISING: ICD-10-CM

## 2023-03-01 DIAGNOSIS — R23.2 HOT FLASHES RELATED TO AROMATASE INHIBITOR THERAPY: Primary | ICD-10-CM

## 2023-03-01 PROCEDURE — 99214 OFFICE O/P EST MOD 30 MIN: CPT | Performed by: FAMILY MEDICINE

## 2023-03-01 SDOH — ECONOMIC STABILITY: INCOME INSECURITY: HOW HARD IS IT FOR YOU TO PAY FOR THE VERY BASICS LIKE FOOD, HOUSING, MEDICAL CARE, AND HEATING?: NOT HARD AT ALL

## 2023-03-01 SDOH — ECONOMIC STABILITY: TRANSPORTATION INSECURITY
IN THE PAST 12 MONTHS, HAS LACK OF TRANSPORTATION KEPT YOU FROM MEETINGS, WORK, OR FROM GETTING THINGS NEEDED FOR DAILY LIVING?: NO

## 2023-03-01 SDOH — ECONOMIC STABILITY: HOUSING INSECURITY
IN THE LAST 12 MONTHS, WAS THERE A TIME WHEN YOU DID NOT HAVE A STEADY PLACE TO SLEEP OR SLEPT IN A SHELTER (INCLUDING NOW)?: NO

## 2023-03-01 SDOH — ECONOMIC STABILITY: FOOD INSECURITY: WITHIN THE PAST 12 MONTHS, YOU WORRIED THAT YOUR FOOD WOULD RUN OUT BEFORE YOU GOT MONEY TO BUY MORE.: NEVER TRUE

## 2023-03-01 SDOH — ECONOMIC STABILITY: FOOD INSECURITY: WITHIN THE PAST 12 MONTHS, THE FOOD YOU BOUGHT JUST DIDN'T LAST AND YOU DIDN'T HAVE MONEY TO GET MORE.: NEVER TRUE

## 2023-03-01 NOTE — ASSESSMENT & PLAN NOTE
Stable, CT scheduled in April for follow up, still doing Aromasin and Zoladex infusions. CMP and CBC scheduled per Oncology. Will continue to follow.

## 2023-03-01 NOTE — ASSESSMENT & PLAN NOTE
Worse since coming off of the Effexor, patient states that they are more frequent and are stronger than before. Has only been off of the Effexor for the last two weeks, so after discussing options will wait for another month for considering another medication, such as Fluoxetine, Clonidine or a lower dose of Effexor. Recheck in 4 weeks.

## 2023-03-01 NOTE — PATIENT INSTRUCTIONS
IT WAS GREAT TO SEE YOU TODAY! I WILL CALL YOU WITH THE RESULTS OF YOUR LABS. PLEASE TAKE ALL MEDICATION AS DISCUSSED. MAKE SURE TO WEAR LIGHT CLOTHES, KEEP FANS CLOSE TO YOU, DRINK LOTS OF COLD WATER, ETC, FOR YOUR HOT FLASHES. WE WILL DISCUSS OPTIONS WHEN YOU RETURN IF IT IS NOT IMPROVING.       I WILL SEE YOU AGAIN IN 4 WEEKS BUT PLEASE CALL WITH CONCERNS [phone]

## 2023-03-02 LAB
25(OH)D3 SERPL-MCNC: 55.9 NG/ML (ref 30–100)
CHOLEST SERPL-MCNC: 207 MG/DL
HDLC SERPL-MCNC: 56 MG/DL (ref 40–60)
HDLC SERPL: 3.7
LDLC SERPL CALC-MCNC: 139.6 MG/DL
TRIGL SERPL-MCNC: 57 MG/DL (ref 35–150)
TSH, 3RD GENERATION: 1.16 UIU/ML (ref 0.36–3.74)
VIT B12 SERPL-MCNC: 317 PG/ML (ref 193–986)
VLDLC SERPL CALC-MCNC: 11.4 MG/DL (ref 6–23)

## 2023-03-06 ENCOUNTER — HOSPITAL ENCOUNTER (OUTPATIENT)
Dept: PHYSICAL THERAPY | Age: 33
Setting detail: RECURRING SERIES
Discharge: HOME | End: 2023-03-09
Payer: COMMERCIAL

## 2023-03-06 PROCEDURE — 97140 MANUAL THERAPY 1/> REGIONS: CPT

## 2023-03-06 PROCEDURE — 97161 PT EVAL LOW COMPLEX 20 MIN: CPT

## 2023-03-06 PROCEDURE — 97110 THERAPEUTIC EXERCISES: CPT

## 2023-03-06 ASSESSMENT — PAIN SCALES - GENERAL: PAINLEVEL_OUTOF10: 0

## 2023-03-06 NOTE — THERAPY EVALUATION
Antonio Dire  : 1990  Primary: Kisha Lima Sc ([facility])  Secondary: [place] [facility] PATIENT ASSISTANCE [facility]  [address] Zelda 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 Referring Physician:  Loren Finch MD MD Orders:  PT Eval and Treat oncology rehab  Return MD Appt:  23  Date of Onset:  Onset Date: 18    Allergies:  Patient has no known allergies. Restrictions/Precautions:    Restrictions/Precautions: None      Medications Last Reviewed:  3/6/2023     SUBJECTIVE   History of Injury/Illness (Reason for Referral): The patient presents having been diagnosed and treated for right breast cancer. She underwent a biopsy 18. She completed partial mastectomy with SNB (1+/9) 18. She completed TCHP. She completed radiation 19. She had bilateral mastectomy and reconstruction 19. She had her final reconstruction 3/6/20. She has developed decreased ROM, tissue tightness of the right axilla and anterior chest.  She has tingling in the arm with it completely extended to the side. She has noticed a popping with certain movements. Patient Stated Goal(s):  \"maintain range of motion\"  Initial:     0/10 Post Session:     0/10  Past Medical History/Comorbidities:   Ms. Kash Carias  has a past medical history of Anxiety, Asthma, Breast cancer ([facility] [address] and Cancer ([facility] [address] Ms. Haley Villeda  has a past surgical history that includes Breast lumpectomy (09/2018); biopsy of breast, needle core (Right, 09/2018); Mastectomy (Bilateral, 11/2019); orthopedic surgery (Left, 06/2010); IR REMOVE TUNNELED CVAD W SQ PORT/PUMP INSERT (10/29/2019); implant breast silicone/eq (36/5868); Breast surgery; and orthopedic surgery (Right, 06/2009). Social History/Living Environment:   Lives With: Spouse     Prior Level of Function/Work/Activity:   Prior level of function: Independent  Occupation: Full time employment  Type of Occupation: marketing           Learning:   Does the patient/guardian have any barriers to learning?: No barriers  Will there be a co-learner?: No  What is the preferred language of the patient/guardian?: English  Is an  required?: No  How does the patient/guardian prefer to learn new concepts?: Listening; Reading; Demonstration; Pictures/Videos     Fall Risk Scale: Stephens Total Score: 0  Stephens Fall Risk: Low (0-24)     Dominant Side:  right handed      OBJECTIVE   Shoulder:  AROM Shoulder (Degrees)  R Shoulder Flexion (0-180): 168  R Shoulder ABduction (0-180): 137  R Shoulder Ext Rotation (0-90): 64  L Shoulder Flexion (0-180): 180  L Shoulder ABduction (0-180): 180  L Shoulder Ext Rotation  (0-90): 90  Girth  DATE 3/6/23 3/6/23      RIGHT LEFT RIGHT LEFT   MCP 17.6 17.4     WRIST 15.1 15     10 cm 20 19.2     20 cm 25 24.8     30 cm 27.8 27.3     40 cm 33.2 32.6     50 cm 34.1 34.2                ASSESSMENT   Initial Assessment:  The patient presents having completed treatment for right breast cancer. She has developed post radiation fibrosis which limits ROM of the right shoulder. She will benefit from therapeutic exercises and manual therapy. She is at risk for lymphedema and will benefit from education for risk reduction. Problem List: (Impacting functional limitations):     Body Structures, Functions, Activity Limitations Requiring Skilled Therapeutic Intervention: Decreased ROM; Decreased strength     Therapy Prognosis:   Therapy Prognosis: Good     Initial Assessment Complexity:   Decision Making: Low Complexity    PLAN   Effective Dates: 3/6/23 TO Plan of Care/Certification Expiration Date: 06/04/23   Frequency/Duration: Plan Frequency: 2x/week for 90 days   Interventions Planned (Treatment may consist of any combination of the following):    Current Treatment Recommendations: Strengthening; ROM; Manual; Manual lymphatic drainage; Home exercise program; Patient/Caregiver education & training     Goals: (Goals have been discussed and agreed upon with patient.)  Short-Term Functional Goals: Time Frame: 4 weeks  The patient will be independent with her HEP within 4 weeks. The patient will gain 10 degrees in all ranges of the right shoulder within 4 weeks. The patient will be independent with self tissue mobilization within 4 weeks. Discharge Goals: Time Frame: 8 weeks  The patient will have knowledge of signs and symptoms of lymphedema and how to manage withi 8 weeks. The patient will improve her DASH by 5 within 8 weeks. Outcome Measure: Tool Used: Disabilities of the Arm, Shoulder and Hand (DASH) Questionnaire - Quick Version  Score:  Initial: 20/55  Most Recent: X/55 (Date: -- )   Interpretation of Score: The DASH is designed to measure the activities of daily living in person's with upper extremity dysfunction or pain. Each section is scored on a 1-5 scale, 5 representing the greatest disability. The scores of each section are added together for a total score of 55.       Tool Used: ECOG Performance Survey Score  Score:  Initial: 1 Most Recent:     Interpretation of Score:   0 Fully active, able to carry on all pre-disease performance without restriction   1 Restricted in physically strenuous activity but ambulatory and able to carry out work of a light or sedentary nature, e.g., light house work, office work   2 Ambulatory and capable of all selfcare but unable to carry out any work activities. Up and about more than 50% of waking hours   3 Capable of only limited selfcare, confined to bed or chair more than 50% of waking hours   4 Completely disabled. Cannot carry on any selfcare. Totally confined to bed or chair   5 Dead       Medical Necessity:   > Patient is expected to demonstrate progress in strength and range of motion to increase independence with household activity. Reason For Services/Other Comments:  > Patient continues to demonstrate capacity to improve strength and ROM which will increase independence. Total Duration:  Time In: 1045  Time Out: 5906    Regarding Sameera Momin's therapy, I certify that the treatment plan above will be carried out by a therapist or under their direction.   Thank you for this referral,  Fuad Can, BRENDA     Referring Physician Signature: Giselle Márquez MD                    Post Session Pain  Charge Capture  PT Visit Info MD Guidelines  Tab

## 2023-03-06 NOTE — PROGRESS NOTES
Danita Sandra  : 1990  [facility] Darrion [place] [facility]  Secondary: [place] COPAY PATIENT ASSISTANCE [facility]  [address] [address]  Phone: [phone]  Fax: [phone] Plan Frequency: 2x/week for 90 days  Plan of Care/Certification Expiration Date: 23    PT Visit Info:  Plan Frequency: 2x/week for 90 days  Plan of Care/Certification Expiration Date: 23    Visit Count:  1    OUTPATIENT PHYSICAL THERAPY:OP NOTE TYPE: Treatment Note 3/6/2023       Episode  }Appt Desk             Treatment Diagnosis:  Stiffness of Right Shoulder, Not elsewhere classified (M25.611)  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 Referring Physician:  Berkley Grande MD MD Orders:  PT Eval and Treat oncology rehab  Date of Onset:  Onset Date: 18   Allergies:   Patient has no known allergies. Restrictions/Precautions:  Restrictions/Precautions: None  No data recorded   Interventions Planned (Treatment may consist of any combination of the following):    Current Treatment Recommendations: Strengthening; ROM; Manual; Manual lymphatic drainage; Home exercise program; Patient/Caregiver education & training     Subjective Comments: The patient reportss he feels like she lost motion overnight. Initial:}    010Post Session:       010  Medications Last Reviewed:  3/6/2023  Updated Objective Findings:  See evaluation note from today  Treatment   THERAPEUTIC EXERCISE: (10 minutes):    Exercises per grid below to improve mobility. Required minimal verbal cues to promote proper body alignment. Progressed range as indicated. MANUAL THERAPY: (15 minutes): Soft tissue mobilization was utilized and necessary because of the patient's restricted joint motion and restricted motion of soft tissue.    No grids  Instructed in and performed wand flexion, wand external rotation, sword of hope and chicken wing.  She was given a written copy of her exercises. She will perform them daily at least twice and work up to 10 reps. She received soft tissue mobilization and scar massage to the right anterior chest and axilla. Treatment/Session Summary:    Treatment Assessment:  The patient gained ROM and tissue pliability following treatment  Communication/Consultation:   HEP  Equipment provided today:  None  Recommendations/Intent for next treatment session: Next visit will focus on ROM and soft tissue mobility. We will educate in lymphedema risk reduction.     Total Treatment Billable Duration:  50 minutes  Time In: 1433  Time Out: 9675    PAYTON MONTANO, PT       Charge Capture  }Post Session Pain  PT Visit Info  [facility] Portal  MD Guidelines  Scanned Media  Benefits  MyChart    Future Appointments   Date Time Provider Eddie Trotter   3/8/2023 10:00 AM Payton Roman, PT [facility]   3/14/2023  8:00 AM Payton Montano, PT [facility]   3/16/2023  7:30 AM Payton Montano, PT [facility]   3/20/2023 11:00 AM Payton Montano, PT [facility]   3/22/2023  7:30 AM Payton Montano, PT [facility]   3/27/2023  3:00 PM  [facility]   4/3/2023 11:20 AM Kofi Rowland DO [facility]   4/24/2023  2:30 PM Basilio [address] [place] [address]   4/24/2023  3:00 PM LYSSA Barclay - CNP [facility]   4/24/2023  3:30 PM  [facility]

## 2023-03-08 ENCOUNTER — HOSPITAL ENCOUNTER (OUTPATIENT)
Dept: PHYSICAL THERAPY | Age: 33
Setting detail: RECURRING SERIES
Discharge: HOME | End: 2023-03-11
Payer: COMMERCIAL

## 2023-03-08 PROCEDURE — 97110 THERAPEUTIC EXERCISES: CPT

## 2023-03-08 PROCEDURE — 97140 MANUAL THERAPY 1/> REGIONS: CPT

## 2023-03-08 ASSESSMENT — PAIN SCALES - GENERAL: PAINLEVEL_OUTOF10: 0

## 2023-03-08 NOTE — PROGRESS NOTES
Jose Burden  : 1990  Primary: Arelis Greenwood (Cristina SWANSON)  Secondary: [place] [employer]AY PATIENT ASSISTANCE O [facility]  [address] [place] 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 Referring Physician:  Lori Cobian MD MD Orders:  PT Eval and Treat oncology rehab  Date of Onset:  Onset Date: 18     Allergies:   Patient has no known allergies. Restrictions/Precautions:  Restrictions/Precautions: None  No data recorded   Interventions Planned (Treatment may consist of any combination of the following):    Current Treatment Recommendations: Strengthening; ROM; Manual; Manual lymphatic drainage; Home exercise program; Patient/Caregiver education & training     Subjective Comments: The patient reports she was a little sore after the last visit. she reports she is having a little stiffness in the front of the shouder. Initial:}    0/10Post Session:       0/10  Medications Last Reviewed:  3/8/2023  Updated Objective Findings:   as below  Flexion 180, abduction 160, external rotation 90  DATE 3/8/23       RIGHT LEFT RIGHT LEFT   MCP 17.4      WRIST 15      10 cm 19.4      20 cm 24.9      30 cm 28.1      40 cm 32.5      50 cm 33.4                 Treatment   THERAPEUTIC EXERCISE: (13minutes):    Exercises per grid below to improve mobility. Required minimal verbal cues to promote proper body alignment.   Progressed range as indicated. MANUAL THERAPY: (25 minutes): Soft tissue mobilization was utilized and necessary because of the patient's restricted joint motion and restricted motion of soft tissue. No grids  Reviewed wand flexion, wand external rotation, sword of hope and chicken wing x 10 reps. She received soft tissue mobilization and scar massage to the right anterior chest and axilla. She was instructed in side lying stretch into flexion and adduction. She was advised to bring her garment next visit. She will wear it for strengthening which we will start next visit with 2#. A prescription for a new garment was requested. Treatment/Session Summary:    Treatment Assessment:  Both ROM and girth are improved. Tissue is more pliable and less tender. Communication/Consultation:   HEP  Equipment provided today:  None  Recommendations/Intent for next treatment session: Next visit will focus on ROM, strength and soft tissue mobility. We will educate in lymphedema risk reduction.     Total Treatment Billable Duration:  38 minutes  Time In: 3400  Time Out: 9108    PAYTON MONTANO, PT       Charge Capture  }Post Session Pain  PT Visit Info  Med[employer] Portal  MD Guidelines  Scanned Media  Benefits  MyChart    Future Appointments   Date Time Provider Eddie Trotter   3/14/2023  8:00 AM Payton Bacon, PT [facility]   3/16/2023  7:30 AM Payton Montano, PT [facility]   3/20/2023 11:00 AM Payton Montano, PT [facility]   3/22/2023  7:30 AM Payton Montano, PT [facility]   3/27/2023  3:00 PM [facility]   4/3/2023 11:20 AM Ariana Ferreira DO BSPDIONNA [facility] AMB   4/24/2023  2:30 PM Basilio [address] [facility]   4/24/2023  3:00 PM LYSSA Melendez - CNP [facility] AMB   4/24/2023  3:30 PM  GCCOP GCC

## 2023-03-14 ENCOUNTER — HOSPITAL ENCOUNTER (OUTPATIENT)
Dept: PHYSICAL THERAPY | Age: 33
Setting detail: RECURRING SERIES
Discharge: HOME | End: 2023-03-17
Payer: COMMERCIAL

## 2023-03-14 PROCEDURE — 97140 MANUAL THERAPY 1/> REGIONS: CPT

## 2023-03-14 PROCEDURE — 97110 THERAPEUTIC EXERCISES: CPT

## 2023-03-14 ASSESSMENT — PAIN SCALES - GENERAL: PAINLEVEL_OUTOF10: 0

## 2023-03-14 NOTE — PROGRESS NOTES
[facility]  : 1990  Primary: Andreina Greenwood (Cristina [employer])  Secondary: [place] COPAY PATIENT ASSISTANCE O [facility]  [address] [place] [address] [address]  Phone: [phone]  Fax: [phone] Plan Frequency: 2x/week for 90 days  Plan of Care/Certification Expiration Date: 23      PT Visit Info:  Plan Frequency: 2x/week for 90 days  Plan of Care/Certification Expiration Date: 23      Visit Count:  3    OUTPATIENT PHYSICAL THERAPY:OP NOTE TYPE: Treatment Note 3/14/2023       Episode  }Appt Desk             Treatment Diagnosis:  Stiffness of Right Shoulder, Not elsewhere classified (M25.611)  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 Referring Physician:  Jorden Sierra MD MD Orders:  PT Eval and Treat oncology rehab  Date of Onset:  Onset Date: 18     Allergies:   Patient has no known allergies. Restrictions/Precautions:  Restrictions/Precautions: None  No data recorded   Interventions Planned (Treatment may consist of any combination of the following):    Current Treatment Recommendations: Strengthening; ROM; Manual; Manual lymphatic drainage; Home exercise program; Patient/Caregiver education & training     Subjective Comments: The patient reports she is feeling some heaviness in the arm since . Initial:}    0/10Post Session:       0/10  Medications Last Reviewed:  3/14/2023  Updated Objective Findings:   as below  Flexion 180, abduction 160, external rotation 90  DATE 3/8/23 3/14/23      RIGHT RIGHT RIGHT LEFT   MCP 17.4 17.3     WRIST 15 15.2     10 cm 19.4 19.7     20 cm 24.9 24.8     30 cm 28.1 28.2     40 cm 32.5 33.2     50 cm 33.4 33.9                Treatment   THERAPEUTIC EXERCISE: (27 minutes):    Exercises per grid below to improve mobility. Required minimal verbal cues to promote proper body alignment. Progressed range as indicated.   MANUAL THERAPY: (15 minutes):   Soft tissue mobilization was utilized and necessary because of the patient's restricted joint motion and restricted motion of soft tissue.   No grids  Reviewed wand flexion, wand external rotation, sword of hope and chicken wing x 10 reps.  She received soft tissue mobilization and scar massage to the right anterior chest.  She is continuing side lying stretch into flexion and adduction.  She had her garment today.  We utilized it due to the heaviness and also to initiate strengthening.  She was provided a prescription for a compression garment that had been requested from her physician.  She was instructed in bicep curls, wall push ups, bent over rows, bent over triceps extension, shoulder flexion to 90 and shoulder abduction to 90.  She will perform these 3x/week for 10 reps.  She was given written instructions.    Treatment/Session Summary:    Treatment Assessment:  ROM is WNL.  Tenderness is less.  soft tissue is more pliable.  Girth is slightly increased.  She will need to monitor the heaviness.  Communication/Consultation:   HEP  Equipment provided today:  None  Recommendations/Intent for next treatment session: Next visit will focus on ROM, strength and soft tissue mobility.  She will return in 2 weeks unless heaviness increases.    Total Treatment Billable Duration:  42 minutes  Time In: 0755  Time Out: 0837    PAYTON GUTIERREZ, PT       Charge Capture  }Post Session Pain  PT Visit Info  [employer] Portal  MD Guidelines  Scanned Media  Benefits  MyChart    Future Appointments   Date Time Provider Department Center   3/16/2023  7:00 PM Payton Gutierrez, PT [facility]   3/22/2023  7:30 AM Payton Gutierrez, PT [facility]   3/27/2023  3:00 PM SS GCCOPIG [facility]   3/28/2023  7:30 AM Payton Gutierrez, PT [facility]   4/3/2023 11:20 AM Sena Almazan, DO BSPCP [facility]   4/24/2023  2:30 PM GCC OUTREACH INSURANCE GCCOIG [facility]   4/24/2023  3:00 PM Mone Calvo, APRN - CNP [facility]   4/24/2023  3:30 PM SS GCCOPIG  [address] [facility]

## 2023-03-16 ENCOUNTER — HOSPITAL ENCOUNTER (OUTPATIENT)
Dept: PHYSICAL THERAPY | Age: 33
Setting detail: RECURRING SERIES
End: 2023-03-16
Payer: COMMERCIAL

## 2023-03-16 NOTE — PROGRESS NOTES
William Kline  : 1990  Primary: Bart Greenwood  Secondary: [place] COPAY PATIENT ASSISTANCE [facility]  [address] [place] [address] [place] [id number]  Phone: [phone]  Fax: [phone]    PT Visit Info:    No data recorded   OT Visit Info:  No data recorded    OUTPATIENT THERAPY: 3/16/2023  Episode  Appt Desk        William Kline cancelled her appointment for today due to  improvements and needing less visits. .  Will plan to follow up next during next appointment.   Thank you,  PAYTON Schwartz, PT    Future Appointments   Date Time Provider Eddie Trotter   3/16/2023  7:00 PM Jamel Fay, PT [facility]   3/22/2023  7:30 AM Payton Montano, PT [facility]   3/27/2023  3:00 PM SS GCCOPIG GCC   3/28/2023  7:30 AM Payton Montano, PT [facility]   4/3/2023 11:20 AM Andrzej Raymundo DO BSPCP [facility] AMB   2023  2:30 PM Basilio [address] [place] [address]   2023  3:00 PM LYSSA Castaneda - CNP [facility] AMB   2023  3:30 PM SS GCCOPIG GCC

## 2023-03-20 ENCOUNTER — APPOINTMENT (OUTPATIENT)
Dept: PHYSICAL THERAPY | Age: 33
End: 2023-03-20
Payer: COMMERCIAL

## 2023-03-22 ENCOUNTER — HOSPITAL ENCOUNTER (OUTPATIENT)
Dept: PHYSICAL THERAPY | Age: 33
Setting detail: RECURRING SERIES
End: 2023-03-22
Payer: COMMERCIAL

## 2023-03-22 NOTE — PROGRESS NOTES
William Kline  : 1990  Primary: Bart Greenwood  Secondary: [place] COP[place] PAT[facility]IST[facility]  [address] [place] [address] [address]  Phone: [phone]  Fax: [phone]    PT Visit Info:    No data recorded   OT Visit Info:  No data recorded    OUTPATIENT THERAPY: 3/22/2023  Episode  Appt Desk        William Kline cancelled her appointment for today due to  needing less appointments than originally planned now that she is improving . Will plan to follow up next during next appointment.   Thank you,  PAYTON MONTANO, PT    Future Appointments   Date Time Provider Eddie Dietzi   3/27/2023  3:00 PM [facility]   3/28/2023  7:30 AM Payton Montano, PT S[facility]PT O   4/3/2023 11:20 AM Andrzej Raymundo DO BSP [facility] AMB   2023  2:30 PM Basilio [facility]   2023  3:00 PM Livia Araujo APRN - CNP [facility] AMB   2023  3:30 PM [facility]

## 2023-03-27 ENCOUNTER — HOSPITAL ENCOUNTER (OUTPATIENT)
Dept: LAB | Age: 33
Discharge: HOME | End: 2023-03-30

## 2023-03-27 ENCOUNTER — HOSPITAL ENCOUNTER (OUTPATIENT)
Dept: INFUSION THERAPY | Age: 33
Discharge: HOME | End: 2023-03-27
Payer: COMMERCIAL

## 2023-03-27 VITALS
DIASTOLIC BLOOD PRESSURE: 77 MMHG | OXYGEN SATURATION: 98 % | HEART RATE: 56 BPM | RESPIRATION RATE: 16 BRPM | SYSTOLIC BLOOD PRESSURE: 138 MMHG | TEMPERATURE: 98.3 F

## 2023-03-27 DIAGNOSIS — C50.919 MALIGNANT NEOPLASM OF FEMALE BREAST, UNSPECIFIED ESTROGEN RECEPTOR STATUS, UNSPECIFIED LATERALITY, UNSPECIFIED SITE OF BREAST (HCC): ICD-10-CM

## 2023-03-27 DIAGNOSIS — Z17.0 MALIGNANT NEOPLASM OF BREAST IN FEMALE, ESTROGEN RECEPTOR POSITIVE, UNSPECIFIED LATERALITY, UNSPECIFIED SITE OF BREAST (HCC): Primary | ICD-10-CM

## 2023-03-27 DIAGNOSIS — C50.919 MALIGNANT NEOPLASM OF BREAST IN FEMALE, ESTROGEN RECEPTOR POSITIVE, UNSPECIFIED LATERALITY, UNSPECIFIED SITE OF BREAST (HCC): Primary | ICD-10-CM

## 2023-03-27 LAB — HCG UR QL: NEGATIVE

## 2023-03-27 PROCEDURE — 96402 CHEMO HORMON ANTINEOPL SQ/IM: CPT

## 2023-03-27 PROCEDURE — 81025 URINE PREGNANCY TEST: CPT

## 2023-03-27 PROCEDURE — 6360000002 HC RX W HCPCS: Performed by: INTERNAL MEDICINE

## 2023-03-27 RX ORDER — ALBUTEROL SULFATE 90 UG/1
4 AEROSOL, METERED RESPIRATORY (INHALATION) PRN
OUTPATIENT
Start: 2023-04-24

## 2023-03-27 RX ORDER — EPINEPHRINE 1 MG/ML
0.3 INJECTION, SOLUTION, CONCENTRATE INTRAVENOUS PRN
OUTPATIENT
Start: 2023-04-24

## 2023-03-27 RX ORDER — ACETAMINOPHEN 325 MG/1
650 TABLET ORAL
OUTPATIENT
Start: 2023-04-24

## 2023-03-27 RX ORDER — DIPHENHYDRAMINE HYDROCHLORIDE 50 MG/ML
50 INJECTION INTRAMUSCULAR; INTRAVENOUS
OUTPATIENT
Start: 2023-04-24

## 2023-03-27 RX ORDER — ONDANSETRON 2 MG/ML
8 INJECTION INTRAMUSCULAR; INTRAVENOUS
OUTPATIENT
Start: 2023-04-24

## 2023-03-27 RX ORDER — SODIUM CHLORIDE 9 MG/ML
INJECTION, SOLUTION INTRAVENOUS CONTINUOUS
OUTPATIENT
Start: 2023-04-24

## 2023-03-27 RX ADMIN — GOSERELIN ACETATE 3.6 MG: 3.6 IMPLANT SUBCUTANEOUS at 15:33

## 2023-03-28 ENCOUNTER — HOSPITAL ENCOUNTER (OUTPATIENT)
Dept: PHYSICAL THERAPY | Age: 33
Setting detail: RECURRING SERIES
Discharge: HOME | End: 2023-03-31
Payer: COMMERCIAL

## 2023-03-28 PROCEDURE — 97110 THERAPEUTIC EXERCISES: CPT

## 2023-03-28 PROCEDURE — 97140 MANUAL THERAPY 1/> REGIONS: CPT

## 2023-03-28 ASSESSMENT — PAIN SCALES - GENERAL: PAINLEVEL_OUTOF10: 0

## 2023-03-31 ASSESSMENT — ENCOUNTER SYMPTOMS
ABDOMINAL PAIN: 0
SHORTNESS OF BREATH: 0
DIARRHEA: 0
COUGH: 0
VOMITING: 0
NAUSEA: 0

## 2023-03-31 NOTE — PROGRESS NOTES
History    Marital status:      Spouse name: None    Number of children: None    Years of education: None    Highest education level: None   Tobacco Use    Smoking status: Never    Smokeless tobacco: Never   Substance and Sexual Activity    Alcohol use: Yes     Alcohol/week: 2.0 standard drinks     Types: 2 Glasses of wine per week    Drug use: No    Sexual activity: Yes     Partners: Male     Birth control/protection: Condom     Social Determinants of Health     Financial Resource Strain: Low Risk     Difficulty of Paying Living Expenses: Not hard at all   Food Insecurity: No Food Insecurity    Worried About [address] un[address] in the Last Year: Never true    [address]  I[employer] in the Last Year: Never true   Transportation Needs: Unknown    Lack of Transportation (Non-Medical): No   Housing Stability: Unknown    Unstable Housing in the Last Year: No       MEDICATIONS      Current Outpatient Medications:     albuterol sulfate HFA (PROVENTIL;VENTOLIN;PROAIR) 108 (90 Base) MCG/ACT inhaler, Inhale 2 puffs into the lungs every 4 hours as needed for Wheezing, Disp: 18 g, Rfl: 5    LORazepam (ATIVAN) 1 MG tablet, Take 1 tablet by mouth every 8 hours as needed for Anxiety for up to 30 days. Max Daily Amount: 3 mg, Disp: 30 tablet, Rfl: 2    Compression Sleeves, Compression sleeve and gauntlet at 20-30mmHg, Disp: 1 each, Rfl: 0    exemestane (AROMASIN) 25 MG tablet, TAKE 1 TABLET BY MOUTH EVERY DAY, Disp: 90 tablet, Rfl: 3    hydrOXYzine HCl (ATARAX) 10 MG tablet, , Disp: , Rfl:     Vitamin D, Cholecalciferol, 50 MCG (2000 UT) CAPS, Take by mouth, Disp: , Rfl:     calcium carb-cholecalciferol 600-400 MG-UNIT TABS per tab, Take 1 tablet by mouth daily, Disp: , Rfl:     Paragard Intrauterine Copper IUD, by IntraUTERine route, Disp: , Rfl:     ALLERGIES / INTOLERANCES    No Known Allergies    REVIEW OF SYSTEMS    Review of Systems   Constitutional:  Negative for fever. HENT:  Negative for congestion.     Respiratory:

## 2023-04-03 ENCOUNTER — OFFICE VISIT (OUTPATIENT)
Dept: PRIMARY CARE CLINIC | Facility: CLINIC | Age: 33
End: 2023-04-03
Payer: COMMERCIAL

## 2023-04-03 VITALS
HEART RATE: 59 BPM | OXYGEN SATURATION: 98 % | SYSTOLIC BLOOD PRESSURE: 132 MMHG | BODY MASS INDEX: 27.71 KG/M2 | HEIGHT: 65 IN | WEIGHT: 166.3 LBS | DIASTOLIC BLOOD PRESSURE: 74 MMHG | RESPIRATION RATE: 16 BRPM

## 2023-04-03 DIAGNOSIS — J45.20 MILD INTERMITTENT ASTHMA WITHOUT COMPLICATION: ICD-10-CM

## 2023-04-03 DIAGNOSIS — C77.3 SECONDARY AND UNSPECIFIED MALIGNANT NEOPLASM OF AXILLA AND UPPER LIMB LYMPH NODES (HCC): ICD-10-CM

## 2023-04-03 DIAGNOSIS — F41.9 ANXIETY: Primary | ICD-10-CM

## 2023-04-03 DIAGNOSIS — T45.1X5A HOT FLASHES RELATED TO AROMATASE INHIBITOR THERAPY: ICD-10-CM

## 2023-04-03 DIAGNOSIS — E78.00 ELEVATED LDL CHOLESTEROL LEVEL: ICD-10-CM

## 2023-04-03 DIAGNOSIS — R23.2 HOT FLASHES RELATED TO AROMATASE INHIBITOR THERAPY: ICD-10-CM

## 2023-04-03 PROCEDURE — 99214 OFFICE O/P EST MOD 30 MIN: CPT | Performed by: FAMILY MEDICINE

## 2023-04-03 RX ORDER — LORAZEPAM 1 MG/1
1 TABLET ORAL EVERY 8 HOURS PRN
Qty: 30 TABLET | Refills: 2 | Status: SHIPPED | OUTPATIENT
Start: 2023-04-03 | End: 2023-05-03

## 2023-04-03 RX ORDER — ALBUTEROL SULFATE 90 UG/1
2 AEROSOL, METERED RESPIRATORY (INHALATION) EVERY 4 HOURS PRN
Qty: 18 G | Refills: 5 | Status: SHIPPED | OUTPATIENT
Start: 2023-04-03

## 2023-04-03 SDOH — ECONOMIC STABILITY: FOOD INSECURITY: WITHIN THE PAST 12 MONTHS, YOU WORRIED THAT YOUR FOOD WOULD RUN OUT BEFORE YOU GOT MONEY TO BUY MORE.: NEVER TRUE

## 2023-04-03 SDOH — ECONOMIC STABILITY: INCOME INSECURITY: HOW HARD IS IT FOR YOU TO PAY FOR THE VERY BASICS LIKE FOOD, HOUSING, MEDICAL CARE, AND HEATING?: NOT HARD AT ALL

## 2023-04-03 SDOH — ECONOMIC STABILITY: FOOD INSECURITY: WITHIN THE PAST 12 MONTHS, THE FOOD YOU BOUGHT JUST DIDN'T LAST AND YOU DIDN'T HAVE MONEY TO GET MORE.: NEVER TRUE

## 2023-04-03 ASSESSMENT — ANXIETY QUESTIONNAIRES
1. FEELING NERVOUS, ANXIOUS, OR ON EDGE: 0
GAD7 TOTAL SCORE: 0
3. WORRYING TOO MUCH ABOUT DIFFERENT THINGS: 0
IF YOU CHECKED OFF ANY PROBLEMS ON THIS QUESTIONNAIRE, HOW DIFFICULT HAVE THESE PROBLEMS MADE IT FOR YOU TO DO YOUR WORK, TAKE CARE OF THINGS AT HOME, OR GET ALONG WITH OTHER PEOPLE: NOT DIFFICULT AT ALL
5. BEING SO RESTLESS THAT IT IS HARD TO SIT STILL: 0
2. NOT BEING ABLE TO STOP OR CONTROL WORRYING: 0
6. BECOMING EASILY ANNOYED OR IRRITABLE: 0
4. TROUBLE RELAXING: 0
7. FEELING AFRAID AS IF SOMETHING AWFUL MIGHT HAPPEN: 0

## 2023-04-03 ASSESSMENT — PATIENT HEALTH QUESTIONNAIRE - PHQ9
SUM OF ALL RESPONSES TO PHQ QUESTIONS 1-9: 0
1. LITTLE INTEREST OR PLEASURE IN DOING THINGS: 0
2. FEELING DOWN, DEPRESSED OR HOPELESS: 0
SUM OF ALL RESPONSES TO PHQ QUESTIONS 1-9: 0
SUM OF ALL RESPONSES TO PHQ QUESTIONS 1-9: 0
SUM OF ALL RESPONSES TO PHQ9 QUESTIONS 1 & 2: 0
SUM OF ALL RESPONSES TO PHQ QUESTIONS 1-9: 0

## 2023-04-03 NOTE — ASSESSMENT & PLAN NOTE
Has routine follow up due to breast cancer history. Slight SCM hypertonicity at clavicle without discrete swollen lymph node. Advised to monitor this as I believe it is more due to muscle from PT than a lymph node but if it does not improve or gets larger advised to let myself or her oncologist know.

## 2023-04-03 NOTE — PATIENT INSTRUCTIONS
IT WAS GREAT TO SEE YOU TODAY! PLEASE TAKE ALL MEDICATION AS DISCUSSED.    ~I HAVE REFILLED YOUR ATIVAN AND ALBUTEROL. USE THEM AS NEEDED. KEEP AN EYE ON THE AREA ABOVE YOUR RIGHT CLAVICLE. I BELIEVE THAT THE SWOLLEN AREA IS MORE DUE TO WORKING THE MUSCLE FROM PT BUT IF IT CHANGES LET MYSELF OR YOUR ONCOLOGIST KNOW.     I WILL SEE YOU AGAIN IN 6 MONTHS BUT PLEASE CALL WITH CONCERNS [phone]

## 2023-04-03 NOTE — ASSESSMENT & PLAN NOTE
Mildly elevated on recent lipid panel. Discussed causes with patient, including lifestyle and genetics. Patient does not eat a lot of fried food, fast food or greasy food. Informed that sometimes this is due to medications but often can be due to genetics. Discussed that lifestyle, including diet and exercise, is the only way we can affect our cholesterol numbers. She notes that her father does have high cholesterol. Plan to monitor for now, will consider low-dose statin if numbers go up despite healthy lifestyle.

## 2023-04-24 ENCOUNTER — HOSPITAL ENCOUNTER (OUTPATIENT)
Dept: INFUSION THERAPY | Age: 33
Discharge: HOME | End: 2023-04-24
Payer: COMMERCIAL

## 2023-04-24 ENCOUNTER — OFFICE VISIT (OUTPATIENT)
Dept: ONCOLOGY | Age: 33
End: 2023-04-24
Payer: COMMERCIAL

## 2023-04-24 ENCOUNTER — HOSPITAL ENCOUNTER (OUTPATIENT)
Dept: LAB | Age: 33
Discharge: HOME | End: 2023-04-27
Payer: COMMERCIAL

## 2023-04-24 VITALS
DIASTOLIC BLOOD PRESSURE: 72 MMHG | HEIGHT: 65 IN | RESPIRATION RATE: 16 BRPM | HEART RATE: 79 BPM | SYSTOLIC BLOOD PRESSURE: 127 MMHG | TEMPERATURE: 98.7 F | BODY MASS INDEX: 26.73 KG/M2 | WEIGHT: 160.4 LBS | OXYGEN SATURATION: 99 %

## 2023-04-24 DIAGNOSIS — Z17.0 MALIGNANT NEOPLASM OF BREAST IN FEMALE, ESTROGEN RECEPTOR POSITIVE, UNSPECIFIED LATERALITY, UNSPECIFIED SITE OF BREAST (HCC): ICD-10-CM

## 2023-04-24 DIAGNOSIS — T45.1X5A AROMATASE INHIBITOR-ASSOCIATED ARTHRALGIA: ICD-10-CM

## 2023-04-24 DIAGNOSIS — Z79.811 LONG TERM (CURRENT) USE OF AROMATASE INHIBITORS: ICD-10-CM

## 2023-04-24 DIAGNOSIS — M25.50 AROMATASE INHIBITOR-ASSOCIATED ARTHRALGIA: ICD-10-CM

## 2023-04-24 DIAGNOSIS — R23.2 HOT FLASHES RELATED TO AROMATASE INHIBITOR THERAPY: ICD-10-CM

## 2023-04-24 DIAGNOSIS — C50.919 MALIGNANT NEOPLASM OF FEMALE BREAST, UNSPECIFIED ESTROGEN RECEPTOR STATUS, UNSPECIFIED LATERALITY, UNSPECIFIED SITE OF BREAST (HCC): Primary | ICD-10-CM

## 2023-04-24 DIAGNOSIS — C50.411 MALIGNANT NEOPLASM OF UPPER-OUTER QUADRANT OF RIGHT BREAST IN FEMALE, ESTROGEN RECEPTOR POSITIVE (HCC): Primary | ICD-10-CM

## 2023-04-24 DIAGNOSIS — T45.1X5A HOT FLASHES RELATED TO AROMATASE INHIBITOR THERAPY: ICD-10-CM

## 2023-04-24 DIAGNOSIS — Z90.13 HX OF BILATERAL MASTECTOMY: ICD-10-CM

## 2023-04-24 DIAGNOSIS — Z17.0 MALIGNANT NEOPLASM OF UPPER-OUTER QUADRANT OF RIGHT BREAST IN FEMALE, ESTROGEN RECEPTOR POSITIVE (HCC): Primary | ICD-10-CM

## 2023-04-24 DIAGNOSIS — Z12.39 BREAST CANCER SCREENING, HIGH RISK PATIENT: ICD-10-CM

## 2023-04-24 DIAGNOSIS — R53.83 FATIGUE DUE TO TREATMENT: ICD-10-CM

## 2023-04-24 DIAGNOSIS — C50.919 MALIGNANT NEOPLASM OF BREAST IN FEMALE, ESTROGEN RECEPTOR POSITIVE, UNSPECIFIED LATERALITY, UNSPECIFIED SITE OF BREAST (HCC): ICD-10-CM

## 2023-04-24 LAB
ALBUMIN SERPL-MCNC: 4.6 G/DL (ref 3.5–5)
ALBUMIN/GLOB SERPL: 1.4 (ref 0.4–1.6)
ALP SERPL-CCNC: 87 U/L (ref 50–136)
ALT SERPL-CCNC: 26 U/L (ref 12–65)
ANION GAP SERPL CALC-SCNC: 3 MMOL/L (ref 2–11)
AST SERPL-CCNC: 16 U/L (ref 15–37)
BASOPHILS # BLD: 0 K/UL (ref 0–0.2)
BASOPHILS NFR BLD: 1 % (ref 0–2)
BILIRUB SERPL-MCNC: 0.3 MG/DL (ref 0.2–1.1)
BUN SERPL-MCNC: 13 MG/DL (ref 6–23)
CALCIUM SERPL-MCNC: 9.7 MG/DL (ref 8.3–10.4)
CHLORIDE SERPL-SCNC: 106 MMOL/L (ref 101–110)
CO2 SERPL-SCNC: 28 MMOL/L (ref 21–32)
CREAT SERPL-MCNC: 0.8 MG/DL (ref 0.6–1)
DIFFERENTIAL METHOD BLD: ABNORMAL
EOSINOPHIL # BLD: 0.3 K/UL (ref 0–0.8)
EOSINOPHIL NFR BLD: 5 % (ref 0.5–7.8)
ERYTHROCYTE [DISTWIDTH] IN BLOOD BY AUTOMATED COUNT: 11.8 % (ref 11.9–14.6)
GLOBULIN SER CALC-MCNC: 3.4 G/DL (ref 2.8–4.5)
GLUCOSE SERPL-MCNC: 111 MG/DL (ref 65–100)
HCT VFR BLD AUTO: 41.9 % (ref 35.8–46.3)
HGB BLD-MCNC: 14.3 G/DL (ref 11.7–15.4)
IMM GRANULOCYTES # BLD AUTO: 0 K/UL (ref 0–0.5)
IMM GRANULOCYTES NFR BLD AUTO: 0 % (ref 0–5)
LYMPHOCYTES # BLD: 1.6 K/UL (ref 0.5–4.6)
LYMPHOCYTES NFR BLD: 26 % (ref 13–44)
MCH RBC QN AUTO: 30.4 PG (ref 26.1–32.9)
MCHC RBC AUTO-ENTMCNC: 34.1 G/DL (ref 31.4–35)
MCV RBC AUTO: 89.1 FL (ref 82–102)
MONOCYTES # BLD: 0.3 K/UL (ref 0.1–1.3)
MONOCYTES NFR BLD: 6 % (ref 4–12)
NEUTS SEG # BLD: 3.9 K/UL (ref 1.7–8.2)
NEUTS SEG NFR BLD: 63 % (ref 43–78)
NRBC # BLD: 0 K/UL (ref 0–0.2)
PLATELET # BLD AUTO: 265 K/UL (ref 150–450)
PMV BLD AUTO: 10.9 FL (ref 9.4–12.3)
POTASSIUM SERPL-SCNC: 3.8 MMOL/L (ref 3.5–5.1)
PROT SERPL-MCNC: 8 G/DL (ref 6.3–8.2)
RBC # BLD AUTO: 4.7 M/UL (ref 4.05–5.2)
SODIUM SERPL-SCNC: 137 MMOL/L (ref 133–143)
WBC # BLD AUTO: 6.2 K/UL (ref 4.3–11.1)

## 2023-04-24 PROCEDURE — 85025 COMPLETE CBC W/AUTO DIFF WBC: CPT

## 2023-04-24 PROCEDURE — 6360000002 HC RX W HCPCS: Performed by: INTERNAL MEDICINE

## 2023-04-24 PROCEDURE — 99214 OFFICE O/P EST MOD 30 MIN: CPT | Performed by: NURSE PRACTITIONER

## 2023-04-24 PROCEDURE — 80053 COMPREHEN METABOLIC PANEL: CPT

## 2023-04-24 PROCEDURE — 36415 COLL VENOUS BLD VENIPUNCTURE: CPT

## 2023-04-24 PROCEDURE — 96402 CHEMO HORMON ANTINEOPL SQ/IM: CPT

## 2023-04-24 RX ORDER — DIPHENHYDRAMINE HYDROCHLORIDE 50 MG/ML
50 INJECTION INTRAMUSCULAR; INTRAVENOUS
Status: DISCONTINUED | OUTPATIENT
Start: 2023-04-24 | End: 2023-04-25 | Stop reason: HOSPADM

## 2023-04-24 RX ORDER — SODIUM CHLORIDE 9 MG/ML
INJECTION, SOLUTION INTRAVENOUS CONTINUOUS
OUTPATIENT
Start: 2023-05-22

## 2023-04-24 RX ORDER — ALBUTEROL SULFATE 90 UG/1
4 AEROSOL, METERED RESPIRATORY (INHALATION) PRN
OUTPATIENT
Start: 2023-05-22

## 2023-04-24 RX ORDER — EPINEPHRINE 1 MG/ML
0.3 INJECTION, SOLUTION, CONCENTRATE INTRAVENOUS PRN
Status: DISCONTINUED | OUTPATIENT
Start: 2023-04-24 | End: 2023-04-25 | Stop reason: HOSPADM

## 2023-04-24 RX ORDER — ACETAMINOPHEN 325 MG/1
650 TABLET ORAL
Status: DISCONTINUED | OUTPATIENT
Start: 2023-04-24 | End: 2023-04-25 | Stop reason: HOSPADM

## 2023-04-24 RX ORDER — DIPHENHYDRAMINE HYDROCHLORIDE 50 MG/ML
50 INJECTION INTRAMUSCULAR; INTRAVENOUS
OUTPATIENT
Start: 2023-05-22

## 2023-04-24 RX ORDER — SODIUM CHLORIDE 9 MG/ML
INJECTION, SOLUTION INTRAVENOUS CONTINUOUS
Status: DISCONTINUED | OUTPATIENT
Start: 2023-04-24 | End: 2023-04-25 | Stop reason: HOSPADM

## 2023-04-24 RX ORDER — ONDANSETRON 2 MG/ML
8 INJECTION INTRAMUSCULAR; INTRAVENOUS
Status: DISCONTINUED | OUTPATIENT
Start: 2023-04-24 | End: 2023-04-25 | Stop reason: HOSPADM

## 2023-04-24 RX ORDER — ACETAMINOPHEN 325 MG/1
650 TABLET ORAL
OUTPATIENT
Start: 2023-05-22

## 2023-04-24 RX ORDER — ONDANSETRON 2 MG/ML
8 INJECTION INTRAMUSCULAR; INTRAVENOUS
OUTPATIENT
Start: 2023-05-22

## 2023-04-24 RX ORDER — EPINEPHRINE 1 MG/ML
0.3 INJECTION, SOLUTION, CONCENTRATE INTRAVENOUS PRN
OUTPATIENT
Start: 2023-05-22

## 2023-04-24 RX ORDER — ALBUTEROL SULFATE 90 UG/1
4 AEROSOL, METERED RESPIRATORY (INHALATION) PRN
Status: DISCONTINUED | OUTPATIENT
Start: 2023-04-24 | End: 2023-04-25 | Stop reason: HOSPADM

## 2023-04-24 RX ADMIN — GOSERELIN ACETATE 3.6 MG: 3.6 IMPLANT SUBCUTANEOUS at 16:03

## 2023-04-24 ASSESSMENT — ENCOUNTER SYMPTOMS
COLOR CHANGE: 0
TROUBLE SWALLOWING: 0
ABDOMINAL PAIN: 0
CHEST TIGHTNESS: 0
NAUSEA: 0
SHORTNESS OF BREATH: 0
BACK PAIN: 1
EYES NEGATIVE: 1
WHEEZING: 0
DIARRHEA: 0
CONSTIPATION: 0

## 2023-04-24 ASSESSMENT — PATIENT HEALTH QUESTIONNAIRE - PHQ9
SUM OF ALL RESPONSES TO PHQ QUESTIONS 1-9: 0
SUM OF ALL RESPONSES TO PHQ QUESTIONS 1-9: 0
3. TROUBLE FALLING OR STAYING ASLEEP: 0
2. FEELING DOWN, DEPRESSED OR HOPELESS: 0
SUM OF ALL RESPONSES TO PHQ9 QUESTIONS 1 & 2: 0
SUM OF ALL RESPONSES TO PHQ QUESTIONS 1-9: 0
SUM OF ALL RESPONSES TO PHQ QUESTIONS 1-9: 0
1. LITTLE INTEREST OR PLEASURE IN DOING THINGS: 0

## 2023-04-24 NOTE — PROGRESS NOTES
[facility] Hematology and Oncology: Office Visit Established Patient    Chief Complaint   Patient presents with    Follow-up         Diagnosis: ER/MT/AR/Her+ right breast cancer    Right under implant/rib pain - same    [facility] [place] - path sent to scanning       History of Present Illness:   Ms. Osbaldo Motley is a 33 y.o. female who returns today for management of breast cancer. The past medical history  is significant for asthma. She initially presented in August 2018 with a mass in her right lateral breast that had been present for approximately 8 years with no nipple discharge or skin changes. Initial workup, including right breast US on 8/27/18  identified a 3.5 cm mass with no suspicious axillary lymph nodes. On 9/11/2018 she underwent US guided core biopsy which identified IDC, grade 3, measuring 13 mm in maximal linear dimension. She saw genetics counselor for testing on 9/17/18 and  underwent egg collection and preservation. She subsequently underwent right partial mastectomy and sentinel lymph node biopsy on 9/24/18 which revealed stage IIb ([id number]), ER/MT/ AR+, HER-2 FISH positive, IDC at 9 o'clock. Surgical margins were  uninvolved by invasive carcinoma, however, DCIS was present at the lateral margin focally, 3 mm, focus of DCIS was also found 1 mm from deep margin and 2 mm from inferior margin. Micrometastasis measuring 13 mm was also found in 1/9 lymph nodes. No  micrometastatic disease was appreciated. She was then seen by Dr. Aarti Pleitez, Oncologist at [facility] in [place], and on 10/22/18, she started C1 adjuvant TCHP. She developed neutropenic fever requiring hospitalization from  12/9/18 - 12/12/18 after C3. No source was found, but viral URI was presumed. C4 was delayed one week due to 70K platelet count. C4 was administered on 12/31/18 with carbo dose reduced to 700 mg (AUC 4.85).   Plan was to completed 6 cycles  of TCHP, with

## 2023-04-24 NOTE — PROGRESS NOTES
Arrived to the [facility]. Zoladex completed. Provided education on Zoladex    Patient instructed to report any side affects to ordering provider. Patient tolerated well. Any issues or concerns during appointment: no.  Discharged ambulatory.

## 2023-05-01 ENCOUNTER — HOSPITAL ENCOUNTER (OUTPATIENT)
Dept: INFUSION THERAPY | Age: 33
End: 2023-05-01

## 2023-05-02 ENCOUNTER — HOSPITAL ENCOUNTER (OUTPATIENT)
Dept: MRI IMAGING | Age: 33
Discharge: HOME | End: 2023-05-05
Payer: COMMERCIAL

## 2023-05-02 DIAGNOSIS — C50.411 MALIGNANT NEOPLASM OF UPPER-OUTER QUADRANT OF RIGHT BREAST IN FEMALE, ESTROGEN RECEPTOR POSITIVE (HCC): ICD-10-CM

## 2023-05-02 DIAGNOSIS — Z12.39 BREAST CANCER SCREENING, HIGH RISK PATIENT: ICD-10-CM

## 2023-05-02 DIAGNOSIS — Z90.13 HX OF BILATERAL MASTECTOMY: ICD-10-CM

## 2023-05-02 DIAGNOSIS — Z17.0 MALIGNANT NEOPLASM OF UPPER-OUTER QUADRANT OF RIGHT BREAST IN FEMALE, ESTROGEN RECEPTOR POSITIVE (HCC): ICD-10-CM

## 2023-05-02 PROCEDURE — 2580000003 HC RX 258: Performed by: NURSE PRACTITIONER

## 2023-05-02 PROCEDURE — C8908 MRI W/O FOL W/CONT, BREAST,: HCPCS

## 2023-05-02 PROCEDURE — 6360000004 HC RX CONTRAST MEDICATION: Performed by: NURSE PRACTITIONER

## 2023-05-02 PROCEDURE — A9579 GAD-BASE MR CONTRAST NOS,1ML: HCPCS | Performed by: NURSE PRACTITIONER

## 2023-05-02 RX ORDER — SODIUM CHLORIDE 0.9 % (FLUSH) 0.9 %
30 SYRINGE (ML) INJECTION AS NEEDED
Status: DISCONTINUED | OUTPATIENT
Start: 2023-05-02 | End: 2023-05-06 | Stop reason: HOSPADM

## 2023-05-02 RX ADMIN — GADOTERIDOL 15 ML: 279.3 INJECTION, SOLUTION INTRAVENOUS at 17:49

## 2023-05-02 RX ADMIN — SODIUM CHLORIDE, PRESERVATIVE FREE 30 ML: 5 INJECTION INTRAVENOUS at 17:49

## 2023-05-26 RX ORDER — ONDANSETRON 2 MG/ML
8 INJECTION INTRAMUSCULAR; INTRAVENOUS
OUTPATIENT
Start: 2023-05-26

## 2023-05-26 RX ORDER — ALBUTEROL SULFATE 90 UG/1
4 AEROSOL, METERED RESPIRATORY (INHALATION) PRN
OUTPATIENT
Start: 2023-05-26

## 2023-05-26 RX ORDER — SODIUM CHLORIDE 9 MG/ML
INJECTION, SOLUTION INTRAVENOUS CONTINUOUS
OUTPATIENT
Start: 2023-05-26

## 2023-05-26 RX ORDER — ACETAMINOPHEN 325 MG/1
650 TABLET ORAL
OUTPATIENT
Start: 2023-05-26

## 2023-05-26 RX ORDER — DIPHENHYDRAMINE HYDROCHLORIDE 50 MG/ML
50 INJECTION INTRAMUSCULAR; INTRAVENOUS
OUTPATIENT
Start: 2023-05-26

## 2023-05-26 RX ORDER — EPINEPHRINE 1 MG/ML
0.3 INJECTION, SOLUTION, CONCENTRATE INTRAVENOUS PRN
OUTPATIENT
Start: 2023-05-26

## 2023-05-26 RX ORDER — FAMOTIDINE 10 MG/ML
20 INJECTION, SOLUTION INTRAVENOUS
OUTPATIENT
Start: 2023-05-26

## 2023-05-29 ENCOUNTER — HOSPITAL ENCOUNTER (OUTPATIENT)
Dept: INFUSION THERAPY | Age: 33
Discharge: HOME | End: 2023-05-29
Payer: COMMERCIAL

## 2023-05-29 VITALS
SYSTOLIC BLOOD PRESSURE: 112 MMHG | OXYGEN SATURATION: 98 % | HEART RATE: 60 BPM | TEMPERATURE: 98.5 F | DIASTOLIC BLOOD PRESSURE: 74 MMHG | RESPIRATION RATE: 16 BRPM

## 2023-05-29 DIAGNOSIS — C50.919 MALIGNANT NEOPLASM OF FEMALE BREAST, UNSPECIFIED ESTROGEN RECEPTOR STATUS, UNSPECIFIED LATERALITY, UNSPECIFIED SITE OF BREAST (HCC): Primary | ICD-10-CM

## 2023-05-29 LAB — HCG UR QL: NEGATIVE

## 2023-05-29 PROCEDURE — 96402 CHEMO HORMON ANTINEOPL SQ/IM: CPT

## 2023-05-29 PROCEDURE — 81025 URINE PREGNANCY TEST: CPT

## 2023-05-29 PROCEDURE — 6360000002 HC RX W HCPCS: Performed by: INTERNAL MEDICINE

## 2023-05-29 RX ORDER — EPINEPHRINE 1 MG/ML
0.3 INJECTION, SOLUTION, CONCENTRATE INTRAVENOUS PRN
OUTPATIENT
Start: 2023-06-19

## 2023-05-29 RX ORDER — ALBUTEROL SULFATE 90 UG/1
4 AEROSOL, METERED RESPIRATORY (INHALATION) PRN
OUTPATIENT
Start: 2023-06-19

## 2023-05-29 RX ORDER — ACETAMINOPHEN 325 MG/1
650 TABLET ORAL
OUTPATIENT
Start: 2023-06-19

## 2023-05-29 RX ORDER — DIPHENHYDRAMINE HYDROCHLORIDE 50 MG/ML
50 INJECTION INTRAMUSCULAR; INTRAVENOUS
OUTPATIENT
Start: 2023-06-19

## 2023-05-29 RX ORDER — SODIUM CHLORIDE 9 MG/ML
INJECTION, SOLUTION INTRAVENOUS CONTINUOUS
OUTPATIENT
Start: 2023-06-19

## 2023-05-29 RX ORDER — ONDANSETRON 2 MG/ML
8 INJECTION INTRAMUSCULAR; INTRAVENOUS
OUTPATIENT
Start: 2023-06-19

## 2023-05-29 RX ADMIN — GOSERELIN ACETATE 3.6 MG: 3.6 IMPLANT SUBCUTANEOUS at 09:04

## 2023-05-29 NOTE — PROGRESS NOTES
Arrived to the [facility]. Zoladex completed. Provided education on SE to report to MD    Patient instructed to report any side affects to ordering provider. Patient tolerated well. Any issues or concerns during appointment: none. Patient aware of next infusion appointment on 06/26/23 (date) at 0830 (time). Discharged ambulatory.

## 2023-06-29 ENCOUNTER — HOSPITAL ENCOUNTER (OUTPATIENT)
Dept: INFUSION THERAPY | Age: 33
Discharge: HOME | End: 2023-06-29
Payer: COMMERCIAL

## 2023-06-29 ENCOUNTER — HOSPITAL ENCOUNTER (OUTPATIENT)
Dept: LAB | Age: 33
Discharge: HOME | End: 2023-07-02

## 2023-06-29 VITALS
RESPIRATION RATE: 16 BRPM | OXYGEN SATURATION: 98 % | SYSTOLIC BLOOD PRESSURE: 113 MMHG | TEMPERATURE: 97.7 F | HEART RATE: 55 BPM | DIASTOLIC BLOOD PRESSURE: 70 MMHG

## 2023-06-29 DIAGNOSIS — C50.919 MALIGNANT NEOPLASM OF FEMALE BREAST, UNSPECIFIED ESTROGEN RECEPTOR STATUS, UNSPECIFIED LATERALITY, UNSPECIFIED SITE OF BREAST (HCC): Primary | ICD-10-CM

## 2023-06-29 LAB — HCG UR QL: NEGATIVE

## 2023-06-29 PROCEDURE — 96402 CHEMO HORMON ANTINEOPL SQ/IM: CPT

## 2023-06-29 PROCEDURE — 6360000002 HC RX W HCPCS: Performed by: INTERNAL MEDICINE

## 2023-06-29 PROCEDURE — 81025 URINE PREGNANCY TEST: CPT

## 2023-06-29 RX ORDER — EPINEPHRINE 1 MG/ML
0.3 INJECTION, SOLUTION, CONCENTRATE INTRAVENOUS PRN
OUTPATIENT
Start: 2023-07-27

## 2023-06-29 RX ORDER — ALBUTEROL SULFATE 90 UG/1
4 AEROSOL, METERED RESPIRATORY (INHALATION) PRN
Status: DISCONTINUED | OUTPATIENT
Start: 2023-06-29 | End: 2023-06-30 | Stop reason: HOSPADM

## 2023-06-29 RX ORDER — SODIUM CHLORIDE 9 MG/ML
INJECTION, SOLUTION INTRAVENOUS CONTINUOUS
OUTPATIENT
Start: 2023-07-27

## 2023-06-29 RX ORDER — DIPHENHYDRAMINE HYDROCHLORIDE 50 MG/ML
50 INJECTION INTRAMUSCULAR; INTRAVENOUS
OUTPATIENT
Start: 2023-07-27

## 2023-06-29 RX ORDER — ONDANSETRON 2 MG/ML
8 INJECTION INTRAMUSCULAR; INTRAVENOUS
OUTPATIENT
Start: 2023-07-27

## 2023-06-29 RX ORDER — ONDANSETRON 2 MG/ML
8 INJECTION INTRAMUSCULAR; INTRAVENOUS
Status: DISCONTINUED | OUTPATIENT
Start: 2023-06-29 | End: 2023-06-30 | Stop reason: HOSPADM

## 2023-06-29 RX ORDER — DIPHENHYDRAMINE HYDROCHLORIDE 50 MG/ML
50 INJECTION INTRAMUSCULAR; INTRAVENOUS
Status: DISCONTINUED | OUTPATIENT
Start: 2023-06-29 | End: 2023-06-30 | Stop reason: HOSPADM

## 2023-06-29 RX ORDER — ALBUTEROL SULFATE 90 UG/1
4 AEROSOL, METERED RESPIRATORY (INHALATION) PRN
OUTPATIENT
Start: 2023-07-27

## 2023-06-29 RX ORDER — ACETAMINOPHEN 325 MG/1
650 TABLET ORAL
Status: DISCONTINUED | OUTPATIENT
Start: 2023-06-29 | End: 2023-06-30 | Stop reason: HOSPADM

## 2023-06-29 RX ORDER — ACETAMINOPHEN 325 MG/1
650 TABLET ORAL
OUTPATIENT
Start: 2023-07-27

## 2023-06-29 RX ORDER — EPINEPHRINE 1 MG/ML
0.3 INJECTION, SOLUTION, CONCENTRATE INTRAVENOUS PRN
Status: DISCONTINUED | OUTPATIENT
Start: 2023-06-29 | End: 2023-06-30 | Stop reason: HOSPADM

## 2023-06-29 RX ADMIN — GOSERELIN ACETATE 3.6 MG: 3.6 IMPLANT SUBCUTANEOUS at 09:09

## 2023-07-27 DIAGNOSIS — Z79.811 LONG TERM (CURRENT) USE OF AROMATASE INHIBITORS: Primary | ICD-10-CM

## 2023-07-27 DIAGNOSIS — Z79.899 HIGH RISK MEDICATION USE: ICD-10-CM

## 2023-07-27 ASSESSMENT — ENCOUNTER SYMPTOMS
BACK PAIN: 1
CHEST TIGHTNESS: 0
DIARRHEA: 0
SHORTNESS OF BREATH: 0
WHEEZING: 0
NAUSEA: 0
EYES NEGATIVE: 1
CONSTIPATION: 0
ABDOMINAL PAIN: 0
TROUBLE SWALLOWING: 0
COLOR CHANGE: 0

## 2023-07-27 NOTE — PROGRESS NOTES
[facility] Hematology and Oncology: Office Visit Established Patient    Chief Complaint   Patient presents with    Follow-up        Diagnosis: ER/TN/AR/Her+ right breast cancer   Right under implant/rib pain - same   S/p bilat mastectomies with recon at [facility] in [facility] - path sent to scanning      History of Present Illness:  Ms. Sohail Xie is a 29 y.o. female who returns today for management of breast cancer. The past medical history  is significant for asthma. She initially presented in August 2018 with a mass in her right lateral breast that had been present for approximately 8 years with no nipple discharge or skin changes. Initial workup, including right breast US on 8/27/18  identified a 3.5 cm mass with no suspicious axillary lymph nodes. On 9/11/2018 she underwent US guided core biopsy which identified IDC, grade 3, measuring 13 mm in maximal linear dimension. She saw genetics counselor for testing on 9/17/18 and  underwent egg collection and preservation. She subsequently underwent right partial mastectomy and sentinel lymph node biopsy on 9/24/18 which revealed stage IIb ([id number]), ER/TN/ AR+, HER-2 FISH positive, IDC at 9 o'clock. Surgical margins were  uninvolved by invasive carcinoma, however, DCIS was present at the lateral margin focally, 3 mm, focus of DCIS was also found 1 mm from deep margin and 2 mm from inferior margin. Micrometastasis measuring 13 mm was also found in 1/9 lymph nodes. No  micrometastatic disease was appreciated. She was then seen by Dr. Antonio Lima, Oncologist at [facility] in [place], and on 10/22/18, she started C1 adjuvant TCHP. She developed neutropenic fever requiring hospitalization from  12/9/18 - 12/12/18 after C3. No source was found, but viral URI was presumed. C4 was delayed one week due to 70K platelet count. C4 was administered on 12/31/18 with carbo dose reduced to 700 mg (AUC 4.85).   Plan was to completed 6 cycles  of TCHP, with

## 2023-07-31 ENCOUNTER — HOSPITAL ENCOUNTER (OUTPATIENT)
Dept: INFUSION THERAPY | Age: 33
Discharge: HOME | End: 2023-07-31

## 2023-07-31 ENCOUNTER — HOSPITAL ENCOUNTER (OUTPATIENT)
Dept: LAB | Age: 33
Discharge: HOME | End: 2023-08-03
Payer: COMMERCIAL

## 2023-07-31 ENCOUNTER — OFFICE VISIT (OUTPATIENT)
Dept: ONCOLOGY | Age: 33
End: 2023-07-31
Payer: COMMERCIAL

## 2023-07-31 VITALS
DIASTOLIC BLOOD PRESSURE: 86 MMHG | HEART RATE: 73 BPM | HEIGHT: 65 IN | BODY MASS INDEX: 27.24 KG/M2 | TEMPERATURE: 97.8 F | OXYGEN SATURATION: 98 % | WEIGHT: 163.5 LBS | SYSTOLIC BLOOD PRESSURE: 124 MMHG

## 2023-07-31 DIAGNOSIS — Z90.13 HX OF BILATERAL MASTECTOMY: ICD-10-CM

## 2023-07-31 DIAGNOSIS — Z17.0 MALIGNANT NEOPLASM OF UPPER-OUTER QUADRANT OF RIGHT BREAST IN FEMALE, ESTROGEN RECEPTOR POSITIVE (HCC): Primary | ICD-10-CM

## 2023-07-31 DIAGNOSIS — Z79.811 LONG TERM (CURRENT) USE OF AROMATASE INHIBITORS: ICD-10-CM

## 2023-07-31 DIAGNOSIS — Z79.811 AROMATASE INHIBITOR USE: ICD-10-CM

## 2023-07-31 DIAGNOSIS — C50.411 MALIGNANT NEOPLASM OF UPPER-OUTER QUADRANT OF RIGHT BREAST IN FEMALE, ESTROGEN RECEPTOR POSITIVE (HCC): Primary | ICD-10-CM

## 2023-07-31 DIAGNOSIS — Z79.899 HIGH RISK MEDICATION USE: ICD-10-CM

## 2023-07-31 DIAGNOSIS — T45.1X5A HOT FLASHES RELATED TO AROMATASE INHIBITOR THERAPY: ICD-10-CM

## 2023-07-31 DIAGNOSIS — R23.2 HOT FLASHES RELATED TO AROMATASE INHIBITOR THERAPY: ICD-10-CM

## 2023-07-31 DIAGNOSIS — Z91.89 AT RISK FOR OSTEOPOROSIS: ICD-10-CM

## 2023-07-31 PROBLEM — H52.10 MYOPIA: Status: ACTIVE | Noted: 2019-10-02

## 2023-07-31 PROBLEM — H52.13 MYOPIA OF BOTH EYES: Status: ACTIVE | Noted: 2022-03-31

## 2023-07-31 PROBLEM — H04.123 DRY EYES: Status: ACTIVE | Noted: 2019-10-02

## 2023-07-31 LAB
ALBUMIN SERPL-MCNC: 4.5 G/DL (ref 3.5–5)
ALBUMIN/GLOB SERPL: 1.2 (ref 0.4–1.6)
ALP SERPL-CCNC: 103 U/L (ref 50–136)
ALT SERPL-CCNC: 27 U/L (ref 12–65)
ANION GAP SERPL CALC-SCNC: 4 MMOL/L (ref 2–11)
AST SERPL-CCNC: 12 U/L (ref 15–37)
BASOPHILS # BLD: 0 K/UL (ref 0–0.2)
BASOPHILS NFR BLD: 1 % (ref 0–2)
BILIRUB SERPL-MCNC: 0.3 MG/DL (ref 0.2–1.1)
BUN SERPL-MCNC: 12 MG/DL (ref 6–23)
CALCIUM SERPL-MCNC: 9.8 MG/DL (ref 8.3–10.4)
CHLORIDE SERPL-SCNC: 106 MMOL/L (ref 101–110)
CO2 SERPL-SCNC: 27 MMOL/L (ref 21–32)
CREAT SERPL-MCNC: 0.7 MG/DL (ref 0.6–1)
DIFFERENTIAL METHOD BLD: NORMAL
EOSINOPHIL # BLD: 0.3 K/UL (ref 0–0.8)
EOSINOPHIL NFR BLD: 4 % (ref 0.5–7.8)
ERYTHROCYTE [DISTWIDTH] IN BLOOD BY AUTOMATED COUNT: 11.9 % (ref 11.9–14.6)
GLOBULIN SER CALC-MCNC: 3.9 G/DL (ref 2.8–4.5)
GLUCOSE SERPL-MCNC: 90 MG/DL (ref 65–100)
HCG UR QL: NEGATIVE
HCT VFR BLD AUTO: 44.1 % (ref 35.8–46.3)
HGB BLD-MCNC: 15 G/DL (ref 11.7–15.4)
IMM GRANULOCYTES # BLD AUTO: 0.1 K/UL (ref 0–0.5)
IMM GRANULOCYTES NFR BLD AUTO: 2 % (ref 0–5)
LYMPHOCYTES # BLD: 1.7 K/UL (ref 0.5–4.6)
LYMPHOCYTES NFR BLD: 22 % (ref 13–44)
MCH RBC QN AUTO: 29.9 PG (ref 26.1–32.9)
MCHC RBC AUTO-ENTMCNC: 34 G/DL (ref 31.4–35)
MCV RBC AUTO: 87.8 FL (ref 82–102)
MONOCYTES # BLD: 0.5 K/UL (ref 0.1–1.3)
MONOCYTES NFR BLD: 6 % (ref 4–12)
NEUTS SEG # BLD: 5.3 K/UL (ref 1.7–8.2)
NEUTS SEG NFR BLD: 65 % (ref 43–78)
NRBC # BLD: 0 K/UL (ref 0–0.2)
PLATELET # BLD AUTO: 325 K/UL (ref 150–450)
PMV BLD AUTO: 10.7 FL (ref 9.4–12.3)
POTASSIUM SERPL-SCNC: 3.8 MMOL/L (ref 3.5–5.1)
PROT SERPL-MCNC: 8.4 G/DL (ref 6.3–8.2)
RBC # BLD AUTO: 5.02 M/UL (ref 4.05–5.2)
SODIUM SERPL-SCNC: 137 MMOL/L (ref 133–143)
WBC # BLD AUTO: 8 K/UL (ref 4.3–11.1)

## 2023-07-31 PROCEDURE — 81025 URINE PREGNANCY TEST: CPT

## 2023-07-31 PROCEDURE — 85025 COMPLETE CBC W/AUTO DIFF WBC: CPT

## 2023-07-31 PROCEDURE — 36415 COLL VENOUS BLD VENIPUNCTURE: CPT

## 2023-07-31 PROCEDURE — 99214 OFFICE O/P EST MOD 30 MIN: CPT | Performed by: INTERNAL MEDICINE

## 2023-07-31 PROCEDURE — 80053 COMPREHEN METABOLIC PANEL: CPT

## 2023-07-31 ASSESSMENT — PATIENT HEALTH QUESTIONNAIRE - PHQ9
2. FEELING DOWN, DEPRESSED OR HOPELESS: 0
SUM OF ALL RESPONSES TO PHQ QUESTIONS 1-9: 0
SUM OF ALL RESPONSES TO PHQ QUESTIONS 1-9: 0
SUM OF ALL RESPONSES TO PHQ9 QUESTIONS 1 & 2: 0
SUM OF ALL RESPONSES TO PHQ QUESTIONS 1-9: 0
1. LITTLE INTEREST OR PLEASURE IN DOING THINGS: 0
SUM OF ALL RESPONSES TO PHQ QUESTIONS 1-9: 0

## 2023-07-31 NOTE — PATIENT INSTRUCTIONS
summary printout at the [address] desk.     Nathanael Baumgarten, RN

## 2023-08-02 ENCOUNTER — CLINICAL DOCUMENTATION (OUTPATIENT)
Dept: PHYSICAL THERAPY | Age: 33
End: 2023-08-02

## 2023-08-02 NOTE — THERAPY DISCHARGE
Antoniettakhurram Hester has been seen in physical therapy from 3/6/23 to 3/28/23 for 4 visits. Treatment has been discontinued at this time due to patient failing to return for additional treatment  Some goals were not met due to the patient not returning.    Thank you for this referral.

## 2023-08-08 ENCOUNTER — CLINICAL DOCUMENTATION (OUTPATIENT)
Dept: ONCOLOGY | Age: 33
End: 2023-08-08

## 2023-08-08 NOTE — PROGRESS NOTES
Call to pt. She is doing well. Spoke about my review re her case w colleague at [facility]. Agrees with switching to Wray due to pt's SE profile on AI/OS (on tx since 2019: Wray --> OS/AI). SE reviewed w pt. She is coming in few wks to reassess her hormone status. Will likely resume menses. No oophorectomies rec due to prolonged risk of bone density loss. She appreciated my call and all q answered.

## 2023-08-15 ENCOUNTER — PATIENT MESSAGE (OUTPATIENT)
Dept: PRIMARY CARE CLINIC | Facility: CLINIC | Age: 33
End: 2023-08-15

## 2023-08-15 ENCOUNTER — APPOINTMENT (OUTPATIENT)
Dept: GENERAL RADIOLOGY | Age: 33
End: 2023-08-15
Payer: COMMERCIAL

## 2023-08-15 ENCOUNTER — HOSPITAL ENCOUNTER (EMERGENCY)
Age: 33
Discharge: HOME | End: 2023-08-15
Attending: EMERGENCY MEDICINE
Payer: COMMERCIAL

## 2023-08-15 VITALS
RESPIRATION RATE: 18 BRPM | SYSTOLIC BLOOD PRESSURE: 147 MMHG | OXYGEN SATURATION: 99 % | TEMPERATURE: 98.9 F | DIASTOLIC BLOOD PRESSURE: 88 MMHG | HEART RATE: 105 BPM

## 2023-08-15 DIAGNOSIS — J45.41 MODERATE PERSISTENT ASTHMA WITH ACUTE EXACERBATION: Primary | ICD-10-CM

## 2023-08-15 DIAGNOSIS — U07.1 COVID-19: ICD-10-CM

## 2023-08-15 LAB
ALBUMIN SERPL-MCNC: 4.3 G/DL (ref 3.5–5)
ALBUMIN/GLOB SERPL: 1.1 (ref 0.4–1.6)
ALP SERPL-CCNC: 99 U/L (ref 50–130)
ALT SERPL-CCNC: 33 U/L (ref 12–65)
ANION GAP SERPL CALC-SCNC: 5 MMOL/L (ref 2–11)
AST SERPL-CCNC: 21 U/L (ref 15–37)
BASOPHILS # BLD: 0 K/UL (ref 0–0.2)
BASOPHILS NFR BLD: 1 % (ref 0–2)
BILIRUB SERPL-MCNC: 0.4 MG/DL (ref 0.2–1.1)
BUN SERPL-MCNC: 7 MG/DL (ref 6–23)
CALCIUM SERPL-MCNC: 9.1 MG/DL (ref 8.3–10.4)
CHLORIDE SERPL-SCNC: 107 MMOL/L (ref 101–110)
CO2 SERPL-SCNC: 29 MMOL/L (ref 21–32)
CREAT SERPL-MCNC: 0.78 MG/DL (ref 0.6–1)
DIFFERENTIAL METHOD BLD: NORMAL
EOSINOPHIL # BLD: 0.2 K/UL (ref 0–0.8)
EOSINOPHIL NFR BLD: 3 % (ref 0.5–7.8)
ERYTHROCYTE [DISTWIDTH] IN BLOOD BY AUTOMATED COUNT: 12 % (ref 11.9–14.6)
GLOBULIN SER CALC-MCNC: 3.9 G/DL (ref 2.8–4.5)
GLUCOSE SERPL-MCNC: 101 MG/DL (ref 65–100)
HCT VFR BLD AUTO: 42.3 % (ref 35.8–46.3)
HGB BLD-MCNC: 14.2 G/DL (ref 11.7–15.4)
IMM GRANULOCYTES # BLD AUTO: 0 K/UL (ref 0–0.5)
IMM GRANULOCYTES NFR BLD AUTO: 0 % (ref 0–5)
LYMPHOCYTES # BLD: 1 K/UL (ref 0.5–4.6)
LYMPHOCYTES NFR BLD: 16 % (ref 13–44)
MAGNESIUM SERPL-MCNC: 2.2 MG/DL (ref 1.8–2.4)
MCH RBC QN AUTO: 30 PG (ref 26.1–32.9)
MCHC RBC AUTO-ENTMCNC: 33.6 G/DL (ref 31.4–35)
MCV RBC AUTO: 89.2 FL (ref 82–102)
MONOCYTES # BLD: 0.4 K/UL (ref 0.1–1.3)
MONOCYTES NFR BLD: 7 % (ref 4–12)
NEUTS SEG # BLD: 4.3 K/UL (ref 1.7–8.2)
NEUTS SEG NFR BLD: 73 % (ref 43–78)
NRBC # BLD: 0 K/UL (ref 0–0.2)
PLATELET # BLD AUTO: 198 K/UL (ref 150–450)
PMV BLD AUTO: 10.2 FL (ref 9.4–12.3)
POTASSIUM SERPL-SCNC: 3.5 MMOL/L (ref 3.5–5.1)
PROT SERPL-MCNC: 8.2 G/DL (ref 6.3–8.2)
RBC # BLD AUTO: 4.74 M/UL (ref 4.05–5.2)
SODIUM SERPL-SCNC: 141 MMOL/L (ref 133–143)
WBC # BLD AUTO: 5.9 K/UL (ref 4.3–11.1)

## 2023-08-15 PROCEDURE — 94664 DEMO&/EVAL PT USE INHALER: CPT

## 2023-08-15 PROCEDURE — 2580000003 HC RX 258: Performed by: EMERGENCY MEDICINE

## 2023-08-15 PROCEDURE — 96374 THER/PROPH/DIAG INJ IV PUSH: CPT

## 2023-08-15 PROCEDURE — 6360000002 HC RX W HCPCS: Performed by: EMERGENCY MEDICINE

## 2023-08-15 PROCEDURE — 99284 EMERGENCY DEPT VISIT MOD MDM: CPT

## 2023-08-15 PROCEDURE — 80053 COMPREHEN METABOLIC PANEL: CPT

## 2023-08-15 PROCEDURE — 6370000000 HC RX 637 (ALT 250 FOR IP): Performed by: EMERGENCY MEDICINE

## 2023-08-15 PROCEDURE — 85025 COMPLETE CBC W/AUTO DIFF WBC: CPT

## 2023-08-15 PROCEDURE — 71045 X-RAY EXAM CHEST 1 VIEW: CPT

## 2023-08-15 PROCEDURE — 83735 ASSAY OF MAGNESIUM: CPT

## 2023-08-15 PROCEDURE — 94640 AIRWAY INHALATION TREATMENT: CPT

## 2023-08-15 RX ORDER — METHYLPREDNISOLONE 4 MG/1
TABLET ORAL
Qty: 1 KIT | Refills: 0 | Status: SHIPPED | OUTPATIENT
Start: 2023-08-15

## 2023-08-15 RX ORDER — IPRATROPIUM BROMIDE AND ALBUTEROL SULFATE 2.5; .5 MG/3ML; MG/3ML
1 SOLUTION RESPIRATORY (INHALATION)
Status: COMPLETED | OUTPATIENT
Start: 2023-08-15 | End: 2023-08-15

## 2023-08-15 RX ORDER — METHYLPREDNISOLONE SODIUM SUCCINATE 125 MG/2ML
125 INJECTION, POWDER, LYOPHILIZED, FOR SOLUTION INTRAMUSCULAR; INTRAVENOUS
Status: DISCONTINUED | OUTPATIENT
Start: 2023-08-15 | End: 2023-08-15

## 2023-08-15 RX ADMIN — IPRATROPIUM BROMIDE AND ALBUTEROL SULFATE 1 DOSE: .5; 3 SOLUTION RESPIRATORY (INHALATION) at 21:10

## 2023-08-15 RX ADMIN — IPRATROPIUM BROMIDE AND ALBUTEROL SULFATE 1 DOSE: .5; 3 SOLUTION RESPIRATORY (INHALATION) at 19:15

## 2023-08-15 RX ADMIN — METHYLPREDNISOLONE SODIUM SUCCINATE 125 MG: 125 INJECTION INTRAMUSCULAR; INTRAVENOUS at 20:07

## 2023-08-15 ASSESSMENT — ENCOUNTER SYMPTOMS
EYE ITCHING: 0
TROUBLE SWALLOWING: 0
BACK PAIN: 0
WHEEZING: 1
EYE PAIN: 0
EYE REDNESS: 0
DIARRHEA: 0
ABDOMINAL PAIN: 0
SINUS PAIN: 0
NAUSEA: 0
SHORTNESS OF BREATH: 1
COUGH: 1
CONSTIPATION: 0
VOMITING: 0

## 2023-08-15 NOTE — ED PROVIDER NOTES
regarding   this examination please feel free to contact a radiologist at [phone]. Jj Avelar M.D.   8/15/2023 7:26:00 PM   CBC with Auto Differential   Result Value Ref Range    WBC 5.9 4.3 - 11.1 K/uL    RBC 4.74 4.05 - 5.2 M/uL    Hemoglobin 14.2 11.7 - 15.4 g/dL    Hematocrit 42.3 35.8 - 46.3 %    MCV 89.2 82.0 - 102.0 FL    MCH 30.0 26.1 - 32.9 PG    MCHC 33.6 31.4 - 35.0 g/dL    RDW 12.0 11.9 - 14.6 %    Platelets 408 399 - 211 K/uL    MPV 10.2 9.4 - 12.3 FL    nRBC 0.00 0.0 - 0.2 K/uL    Differential Type AUTOMATED      Neutrophils % 73 43 - 78 %    Lymphocytes % 16 13 - 44 %    Monocytes % 7 4.0 - 12.0 %    Eosinophils % 3 0.5 - 7.8 %    Basophils % 1 0.0 - 2.0 %    Immature Granulocytes 0 0.0 - 5.0 %    Neutrophils Absolute 4.3 1.7 - 8.2 K/UL    Lymphocytes Absolute 1.0 0.5 - 4.6 K/UL    Monocytes Absolute 0.4 0.1 - 1.3 K/UL    Eosinophils Absolute 0.2 0.0 - 0.8 K/UL    Basophils Absolute 0.0 0.0 - 0.2 K/UL    Absolute Immature Granulocyte 0.0 0.0 - 0.5 K/UL   Comprehensive Metabolic Panel   Result Value Ref Range    Sodium 141 133 - 143 mmol/L    Potassium 3.5 3.5 - 5.1 mmol/L    Chloride 107 101 - 110 mmol/L    CO2 29 21 - 32 mmol/L    Anion Gap 5 2 - 11 mmol/L    Glucose 101 (H) 65 - 100 mg/dL    BUN 7 6 - 23 MG/DL    Creatinine 0.78 0.6 - 1.0 MG/DL    Est, Glom Filt Rate >60 >60 ml/min/1.73m2    Calcium 9.1 8.3 - 10.4 MG/DL    Total Bilirubin 0.4 0.2 - 1.1 MG/DL    ALT 33 12 - 65 U/L    AST 21 15 - 37 U/L    Alk Phosphatase 99 50 - 130 U/L    Total Protein 8.2 6.3 - 8.2 g/dL    Albumin 4.3 3.5 - 5.0 g/dL    Globulin 3.9 2.8 - 4.5 g/dL    Albumin/Globulin Ratio 1.1 0.4 - 1.6     Magnesium   Result Value Ref Range    Magnesium 2.2 1.8 - 2.4 mg/dL        XR CHEST 1 VIEW   Final Result      1. No acute findings. This exam was interpreted by a board-certified, body-imaging fellowship trained    radiologist from [facility]  H .1 C/Dyllan Woodruff Final Radiology.  If there are any questions regarding    this

## 2023-08-15 NOTE — ED NOTES
Patient reports recent covid diagnosis, states increased MDI use.  Patient ambulatory SPO2 96%     Cindy Gardner RN  08/15/23 [id number]

## 2023-08-15 NOTE — ED TRIAGE NOTES
Pt states she tested positive for Covid yesterday. Pt states hx of asthma and has had increased trouble breathing. Pt states she has been using her albuterol with little relief.

## 2023-08-16 ENCOUNTER — CARE COORDINATION (OUTPATIENT)
Dept: CARE COORDINATION | Facility: CLINIC | Age: 33
End: 2023-08-16

## 2023-08-16 RX ORDER — NEBULIZER ACCESSORIES
1 KIT MISCELLANEOUS 4 TIMES DAILY PRN
Qty: 1 KIT | Refills: 0 | Status: SHIPPED | OUTPATIENT
Start: 2023-08-16

## 2023-08-16 NOTE — DISCHARGE INSTRUCTIONS
Take medications as prescribed  Continue all your current medications  Drink plenty of fluids  Follow COVID isolation protocols  Follow-up with your primary care provider  Return to ER for any worsening symptoms or new problems which may arise

## 2023-08-16 NOTE — CARE COORDINATION
Date/Time:  8/16/2023 8:30 AM  Attempted to reach patient by telephone. Call within 2 business days of discharge: Yes Left HIPPA compliant message requesting a return call. Will attempt to reach patient again.

## 2023-08-17 ENCOUNTER — CARE COORDINATION (OUTPATIENT)
Dept: CARE COORDINATION | Facility: CLINIC | Age: 33
End: 2023-08-17

## 2023-08-17 NOTE — CARE COORDINATION
Date/Time:  8/17/2023 8:15 AM  Attempted to reach patient by telephone. Call within 2 business days of discharge: Yes Left HIPPA compliant message requesting a return call. Unable to reach patient after two attempts. Will resolve episode but reopen if return call received.

## 2023-09-01 ENCOUNTER — HOSPITAL ENCOUNTER (OUTPATIENT)
Dept: LAB | Age: 33
Discharge: HOME | End: 2023-09-01
Payer: COMMERCIAL

## 2023-09-01 DIAGNOSIS — C50.411 MALIGNANT NEOPLASM OF UPPER-OUTER QUADRANT OF RIGHT BREAST IN FEMALE, ESTROGEN RECEPTOR POSITIVE (HCC): ICD-10-CM

## 2023-09-01 DIAGNOSIS — Z17.0 MALIGNANT NEOPLASM OF UPPER-OUTER QUADRANT OF RIGHT BREAST IN FEMALE, ESTROGEN RECEPTOR POSITIVE (HCC): ICD-10-CM

## 2023-09-01 LAB
ALBUMIN SERPL-MCNC: 3.4 G/DL (ref 3.5–5)
ALBUMIN/GLOB SERPL: 1.1 (ref 0.4–1.6)
ALP SERPL-CCNC: 75 U/L (ref 50–136)
ALT SERPL-CCNC: 54 U/L (ref 12–65)
ANION GAP SERPL CALC-SCNC: 4 MMOL/L (ref 2–11)
AST SERPL-CCNC: 22 U/L (ref 15–37)
BASOPHILS # BLD: 0 K/UL (ref 0–0.2)
BASOPHILS NFR BLD: 0 % (ref 0–2)
BILIRUB SERPL-MCNC: 0.3 MG/DL (ref 0.2–1.1)
BUN SERPL-MCNC: 15 MG/DL (ref 6–23)
CALCIUM SERPL-MCNC: 8.8 MG/DL (ref 8.3–10.4)
CHLORIDE SERPL-SCNC: 111 MMOL/L (ref 101–110)
CO2 SERPL-SCNC: 26 MMOL/L (ref 21–32)
CREAT SERPL-MCNC: 0.7 MG/DL (ref 0.6–1)
DIFFERENTIAL METHOD BLD: NORMAL
EOSINOPHIL # BLD: 0.2 K/UL (ref 0–0.8)
EOSINOPHIL NFR BLD: 3 % (ref 0.5–7.8)
ERYTHROCYTE [DISTWIDTH] IN BLOOD BY AUTOMATED COUNT: 12.6 % (ref 11.9–14.6)
ESTRADIOL SERPL-MCNC: 115.31 PG/ML
FSH SERPL-ACNC: 3.7 MIU/ML
GLOBULIN SER CALC-MCNC: 3.2 G/DL (ref 2.8–4.5)
GLUCOSE SERPL-MCNC: 108 MG/DL (ref 65–100)
HCT VFR BLD AUTO: 38.6 % (ref 35.8–46.3)
HGB BLD-MCNC: 13 G/DL (ref 11.7–15.4)
IMM GRANULOCYTES # BLD AUTO: 0 K/UL (ref 0–0.5)
IMM GRANULOCYTES NFR BLD AUTO: 0 % (ref 0–5)
LH SERPL-ACNC: 1.8 MIU/ML
LYMPHOCYTES # BLD: 1.2 K/UL (ref 0.5–4.6)
LYMPHOCYTES NFR BLD: 23 % (ref 13–44)
MCH RBC QN AUTO: 30.2 PG (ref 26.1–32.9)
MCHC RBC AUTO-ENTMCNC: 33.7 G/DL (ref 31.4–35)
MCV RBC AUTO: 89.8 FL (ref 82–102)
MONOCYTES # BLD: 0.5 K/UL (ref 0.1–1.3)
MONOCYTES NFR BLD: 9 % (ref 4–12)
NEUTS SEG # BLD: 3.5 K/UL (ref 1.7–8.2)
NEUTS SEG NFR BLD: 65 % (ref 43–78)
NRBC # BLD: 0 K/UL (ref 0–0.2)
PLATELET # BLD AUTO: 213 K/UL (ref 150–450)
PMV BLD AUTO: 10.8 FL (ref 9.4–12.3)
POTASSIUM SERPL-SCNC: 4.1 MMOL/L (ref 3.5–5.1)
PROT SERPL-MCNC: 6.6 G/DL (ref 6.3–8.2)
RBC # BLD AUTO: 4.3 M/UL (ref 4.05–5.2)
SODIUM SERPL-SCNC: 141 MMOL/L (ref 133–143)
WBC # BLD AUTO: 5.4 K/UL (ref 4.3–11.1)

## 2023-09-01 PROCEDURE — 82670 ASSAY OF TOTAL ESTRADIOL: CPT

## 2023-09-01 PROCEDURE — 83001 ASSAY OF GONADOTROPIN (FSH): CPT

## 2023-09-01 PROCEDURE — 83002 ASSAY OF GONADOTROPIN (LH): CPT

## 2023-09-01 PROCEDURE — 80053 COMPREHEN METABOLIC PANEL: CPT

## 2023-09-01 PROCEDURE — 36415 COLL VENOUS BLD VENIPUNCTURE: CPT

## 2023-09-01 PROCEDURE — 85025 COMPLETE CBC W/AUTO DIFF WBC: CPT

## 2023-09-14 ENCOUNTER — PATIENT MESSAGE (OUTPATIENT)
Dept: ONCOLOGY | Age: 33
End: 2023-09-14

## 2023-09-15 RX ORDER — TAMOXIFEN CITRATE 20 MG/1
20 TABLET ORAL DAILY
Qty: 90 TABLET | Refills: 3 | Status: SHIPPED | OUTPATIENT
Start: 2023-09-15

## 2023-09-15 NOTE — TELEPHONE ENCOUNTER
Per Dr. Lacy Bella patient to start taking Tamoxifen 20mg daily. Prescription sent to patient's preferred pharmacy.

## 2023-09-25 ENCOUNTER — TELEPHONE (OUTPATIENT)
Dept: OBGYN CLINIC | Age: 33
End: 2023-09-25

## 2023-09-28 ENCOUNTER — OFFICE VISIT (OUTPATIENT)
Dept: OBGYN CLINIC | Age: 33
End: 2023-09-28
Payer: COMMERCIAL

## 2023-09-28 VITALS
BODY MASS INDEX: 27.72 KG/M2 | DIASTOLIC BLOOD PRESSURE: 76 MMHG | WEIGHT: 166.4 LBS | HEIGHT: 65 IN | SYSTOLIC BLOOD PRESSURE: 126 MMHG

## 2023-09-28 DIAGNOSIS — Z11.51 SCREENING FOR HUMAN PAPILLOMAVIRUS (HPV): ICD-10-CM

## 2023-09-28 DIAGNOSIS — Z13.89 SCREENING FOR GENITOURINARY CONDITION: ICD-10-CM

## 2023-09-28 DIAGNOSIS — Z01.419 WELL WOMAN EXAM: Primary | ICD-10-CM

## 2023-09-28 DIAGNOSIS — Z12.4 PAP SMEAR FOR CERVICAL CANCER SCREENING: ICD-10-CM

## 2023-09-28 LAB
BILIRUBIN, URINE, POC: NEGATIVE
BLOOD URINE, POC: NEGATIVE
GLUCOSE URINE, POC: NEGATIVE
KETONES, URINE, POC: NEGATIVE
LEUKOCYTE ESTERASE, URINE, POC: NEGATIVE
NITRITE, URINE, POC: NEGATIVE
PH, URINE, POC: 5.5 (ref 4.6–8)
PROTEIN,URINE, POC: NEGATIVE
SPECIFIC GRAVITY, URINE, POC: 1.02 (ref 1–1.03)
URINALYSIS CLARITY, POC: CLEAR
URINALYSIS COLOR, POC: YELLOW
UROBILINOGEN, POC: NORMAL

## 2023-09-28 PROCEDURE — 81003 URINALYSIS AUTO W/O SCOPE: CPT | Performed by: NURSE PRACTITIONER

## 2023-09-28 PROCEDURE — 99395 PREV VISIT EST AGE 18-39: CPT | Performed by: NURSE PRACTITIONER

## 2023-09-28 NOTE — PROGRESS NOTES
Rfx HPV 16/18,45; Future  - PAP IG, HPV Rfx HPV 16/18,45         Disc bleeding after switching to tamoxifen after prev 5 yrs amenorrhea. Disc inc risk of endometrial carcinoma with tamoxifen use and bleeding requires investigation  She will rtc for US to evaluate further and will possibly need biopsy to evaluate endometrium. We disc bx process at length. pap smear  return annually or prn    Supervising physician is Dr. Isak Butler.

## 2023-09-29 ENCOUNTER — OFFICE VISIT (OUTPATIENT)
Dept: OBGYN CLINIC | Age: 33
End: 2023-09-29

## 2023-09-29 VITALS — WEIGHT: 165.5 LBS | HEIGHT: 65 IN | BODY MASS INDEX: 27.57 KG/M2

## 2023-09-29 DIAGNOSIS — N93.9 VAGINAL BLEEDING: Primary | ICD-10-CM

## 2023-09-29 NOTE — PROGRESS NOTES
US findings from today:  GYN US secondary to vb after starting tamoxifen  Cx appears wnl, iud strings visualized in canal  Uterus anteverted and heterogenous  Endo =5.2 ParaGard iud appears slightly low (approx 0.8 cm from fundal portion of endo) t arms are lateral but are positioned down toward body of iud and appears to be within myometrium bilateral feeder vessel noted adj to body of iud with echogenic mass, c/w possible polyp = 0.3 x 0.2 x 0.3 cm  Rov visualized with follicles an wnl  Lov visualized with follicles and wnl  No adnexal masses or free fluid visualized     Patient amenorrheic after chemotherapy 5 years ago. She was switched to Tamoxifen 2 weeks ago by her oncologist from other adjuvant therapeutic and started what in every way seemed like a menstrual cycle. It was heavier than her previous cycles but started and lasted 7 days, tapering off in a typical way. USG findings reviewed with patient as above. Implications of ?polyp, endometrial thickness and follicular activity discussed in detail. Speculation about ovulation vs. BTB discussed as well. At this point, I have recommended observation to see if spontaneous menses occurs in the next 3 weeks. She will communicate with her oncologist regarding subtotal suppression of ovarian tissue with Tamoxifen. Possible candidate for oopherectomy vs GnRH agonist.  If no further menses, repeat USG in 6 mos to evaluate endometrium, ParaGard. Patient records reviewed and counseled face to face for 30 minutes regarding her concerns, differential diagnosis and disposition with greater than 50% of the time spent in this way.

## 2023-10-03 LAB
CYTOLOGIST CVX/VAG CYTO: NORMAL
CYTOLOGY CVX/VAG DOC THIN PREP: NORMAL
HPV APTIMA: NEGATIVE
Lab: NORMAL
PATH REPORT.FINAL DX SPEC: NORMAL
STAT OF ADQ CVX/VAG CYTO-IMP: NORMAL

## 2023-10-08 ASSESSMENT — PATIENT HEALTH QUESTIONNAIRE - PHQ9
2. FEELING DOWN, DEPRESSED OR HOPELESS: 1
SUM OF ALL RESPONSES TO PHQ QUESTIONS 1-9: 1
2. FEELING DOWN, DEPRESSED OR HOPELESS: SEVERAL DAYS
1. LITTLE INTEREST OR PLEASURE IN DOING THINGS: NOT AT ALL
1. LITTLE INTEREST OR PLEASURE IN DOING THINGS: 0
SUM OF ALL RESPONSES TO PHQ QUESTIONS 1-9: 1
SUM OF ALL RESPONSES TO PHQ9 QUESTIONS 1 & 2: 1
SUM OF ALL RESPONSES TO PHQ QUESTIONS 1-9: 1
SUM OF ALL RESPONSES TO PHQ QUESTIONS 1-9: 1
SUM OF ALL RESPONSES TO PHQ9 QUESTIONS 1 & 2: 1

## 2023-10-09 ENCOUNTER — OFFICE VISIT (OUTPATIENT)
Dept: PRIMARY CARE CLINIC | Facility: CLINIC | Age: 33
End: 2023-10-09
Payer: COMMERCIAL

## 2023-10-09 VITALS
SYSTOLIC BLOOD PRESSURE: 128 MMHG | WEIGHT: 168 LBS | HEART RATE: 89 BPM | BODY MASS INDEX: 27.99 KG/M2 | OXYGEN SATURATION: 98 % | TEMPERATURE: 98.1 F | RESPIRATION RATE: 16 BRPM | DIASTOLIC BLOOD PRESSURE: 78 MMHG | HEIGHT: 65 IN

## 2023-10-09 DIAGNOSIS — T45.1X5A HOT FLASHES RELATED TO AROMATASE INHIBITOR THERAPY: ICD-10-CM

## 2023-10-09 DIAGNOSIS — R23.2 HOT FLASHES RELATED TO AROMATASE INHIBITOR THERAPY: ICD-10-CM

## 2023-10-09 DIAGNOSIS — C50.919 MALIGNANT NEOPLASM OF FEMALE BREAST, UNSPECIFIED ESTROGEN RECEPTOR STATUS, UNSPECIFIED LATERALITY, UNSPECIFIED SITE OF BREAST (HCC): ICD-10-CM

## 2023-10-09 DIAGNOSIS — F41.9 ANXIETY: ICD-10-CM

## 2023-10-09 DIAGNOSIS — N85.8 MASS OF UTERUS DETERMINED BY ULTRASOUND: Primary | ICD-10-CM

## 2023-10-09 PROCEDURE — 99214 OFFICE O/P EST MOD 30 MIN: CPT | Performed by: FAMILY MEDICINE

## 2023-10-09 RX ORDER — LORAZEPAM 1 MG/1
1 TABLET ORAL EVERY 8 HOURS PRN
Qty: 90 TABLET | Refills: 2 | Status: SHIPPED | OUTPATIENT
Start: 2023-10-09 | End: 2024-01-07

## 2023-10-09 ASSESSMENT — ENCOUNTER SYMPTOMS
SHORTNESS OF BREATH: 0
DIARRHEA: 0
COUGH: 0
NAUSEA: 0
VOMITING: 0
ABDOMINAL PAIN: 0

## 2023-10-09 NOTE — ASSESSMENT & PLAN NOTE
Appears to be a polyp on Dr. El Ledesma. On further review of the note waiting to see if she has spontaneous menses over the next 3 months and planning to repeat US in 6 months.

## 2023-10-09 NOTE — ASSESSMENT & PLAN NOTE
Managed by Dr. Brain Correa. Recently switched to Tamoxifen from Zoladex and Aromasin, had some vaginal bleeding after but has done better in terms of side effects. Will continue to follow.

## 2023-10-09 NOTE — PATIENT INSTRUCTIONS
IT WAS GREAT TO SEE YOU TODAY! ON REVIEWING DR. Lopez Headings NOTE, IT LOOKS LIKE HE WANTS TO REPEAT THE ULTRASOUND IN 6 MONTHS TO RECHECK THE POLYP IN YOUR UTERUS. I REFILLED YOUR LORAZEPAM TO USE AS NEEDED FOR ANXIETY.     I WILL SEE YOU AGAIN IN 3 MONTHS BUT PLEASE CALL WITH CONCERNS [phone]

## 2023-11-01 DIAGNOSIS — Z79.811 AROMATASE INHIBITOR USE: ICD-10-CM

## 2023-11-01 DIAGNOSIS — Z17.0 MALIGNANT NEOPLASM OF UPPER-OUTER QUADRANT OF RIGHT BREAST IN FEMALE, ESTROGEN RECEPTOR POSITIVE (HCC): Primary | ICD-10-CM

## 2023-11-01 DIAGNOSIS — C50.411 MALIGNANT NEOPLASM OF UPPER-OUTER QUADRANT OF RIGHT BREAST IN FEMALE, ESTROGEN RECEPTOR POSITIVE (HCC): Primary | ICD-10-CM

## 2023-11-02 ENCOUNTER — HOSPITAL ENCOUNTER (OUTPATIENT)
Dept: LAB | Age: 33
Discharge: HOME | End: 2023-11-02
Payer: COMMERCIAL

## 2023-11-02 ENCOUNTER — OFFICE VISIT (OUTPATIENT)
Dept: ONCOLOGY | Age: 33
End: 2023-11-02
Payer: COMMERCIAL

## 2023-11-02 VITALS
DIASTOLIC BLOOD PRESSURE: 67 MMHG | OXYGEN SATURATION: 100 % | RESPIRATION RATE: 16 BRPM | BODY MASS INDEX: 27.96 KG/M2 | HEART RATE: 55 BPM | TEMPERATURE: 97.9 F | WEIGHT: 168 LBS | SYSTOLIC BLOOD PRESSURE: 133 MMHG

## 2023-11-02 DIAGNOSIS — C50.411 MALIGNANT NEOPLASM OF UPPER-OUTER QUADRANT OF RIGHT BREAST IN FEMALE, ESTROGEN RECEPTOR POSITIVE (HCC): Primary | ICD-10-CM

## 2023-11-02 DIAGNOSIS — R23.2 HOT FLASHES RELATED TO AROMATASE INHIBITOR THERAPY: ICD-10-CM

## 2023-11-02 DIAGNOSIS — T45.1X5A HOT FLASHES RELATED TO AROMATASE INHIBITOR THERAPY: ICD-10-CM

## 2023-11-02 DIAGNOSIS — Z90.13 HX OF BILATERAL MASTECTOMY: ICD-10-CM

## 2023-11-02 DIAGNOSIS — Z17.0 MALIGNANT NEOPLASM OF UPPER-OUTER QUADRANT OF RIGHT BREAST IN FEMALE, ESTROGEN RECEPTOR POSITIVE (HCC): Primary | ICD-10-CM

## 2023-11-02 DIAGNOSIS — C50.411 MALIGNANT NEOPLASM OF UPPER-OUTER QUADRANT OF RIGHT BREAST IN FEMALE, ESTROGEN RECEPTOR POSITIVE (HCC): ICD-10-CM

## 2023-11-02 DIAGNOSIS — Z79.810 LONG-TERM CURRENT USE OF TAMOXIFEN: ICD-10-CM

## 2023-11-02 DIAGNOSIS — Z79.811 AROMATASE INHIBITOR USE: ICD-10-CM

## 2023-11-02 DIAGNOSIS — Z17.0 MALIGNANT NEOPLASM OF UPPER-OUTER QUADRANT OF RIGHT BREAST IN FEMALE, ESTROGEN RECEPTOR POSITIVE (HCC): ICD-10-CM

## 2023-11-02 DIAGNOSIS — Z91.89 AT RISK FOR OSTEOPOROSIS: ICD-10-CM

## 2023-11-02 LAB
ALBUMIN SERPL-MCNC: 3.7 G/DL (ref 3.5–5)
ALBUMIN/GLOB SERPL: 1.2 (ref 0.4–1.6)
ALP SERPL-CCNC: 55 U/L (ref 50–136)
ALT SERPL-CCNC: 29 U/L (ref 12–65)
ANION GAP SERPL CALC-SCNC: 3 MMOL/L (ref 2–11)
AST SERPL-CCNC: 19 U/L (ref 15–37)
BASOPHILS # BLD: 0 K/UL (ref 0–0.2)
BASOPHILS NFR BLD: 1 % (ref 0–2)
BILIRUB SERPL-MCNC: 0.3 MG/DL (ref 0.2–1.1)
BUN SERPL-MCNC: 12 MG/DL (ref 6–23)
CALCIUM SERPL-MCNC: 8.8 MG/DL (ref 8.3–10.4)
CHLORIDE SERPL-SCNC: 108 MMOL/L (ref 101–110)
CO2 SERPL-SCNC: 28 MMOL/L (ref 21–32)
CREAT SERPL-MCNC: 0.7 MG/DL (ref 0.6–1)
DIFFERENTIAL METHOD BLD: NORMAL
EOSINOPHIL # BLD: 0.4 K/UL (ref 0–0.8)
EOSINOPHIL NFR BLD: 7 % (ref 0.5–7.8)
ERYTHROCYTE [DISTWIDTH] IN BLOOD BY AUTOMATED COUNT: 11.9 % (ref 11.9–14.6)
GLOBULIN SER CALC-MCNC: 3 G/DL (ref 2.8–4.5)
GLUCOSE SERPL-MCNC: 100 MG/DL (ref 65–100)
HCG SERPL-ACNC: <1 MIU/ML (ref 0–6)
HCT VFR BLD AUTO: 40.3 % (ref 35.8–46.3)
HGB BLD-MCNC: 13.3 G/DL (ref 11.7–15.4)
IMM GRANULOCYTES # BLD AUTO: 0 K/UL (ref 0–0.5)
IMM GRANULOCYTES NFR BLD AUTO: 0 % (ref 0–5)
LYMPHOCYTES # BLD: 1.2 K/UL (ref 0.5–4.6)
LYMPHOCYTES NFR BLD: 24 % (ref 13–44)
MCH RBC QN AUTO: 29.9 PG (ref 26.1–32.9)
MCHC RBC AUTO-ENTMCNC: 33 G/DL (ref 31.4–35)
MCV RBC AUTO: 90.6 FL (ref 82–102)
MONOCYTES # BLD: 0.4 K/UL (ref 0.1–1.3)
MONOCYTES NFR BLD: 7 % (ref 4–12)
NEUTS SEG # BLD: 3.1 K/UL (ref 1.7–8.2)
NEUTS SEG NFR BLD: 62 % (ref 43–78)
NRBC # BLD: 0 K/UL (ref 0–0.2)
PLATELET # BLD AUTO: 214 K/UL (ref 150–450)
PMV BLD AUTO: 11.1 FL (ref 9.4–12.3)
POTASSIUM SERPL-SCNC: 4.2 MMOL/L (ref 3.5–5.1)
PROT SERPL-MCNC: 6.7 G/DL (ref 6.3–8.2)
RBC # BLD AUTO: 4.45 M/UL (ref 4.05–5.2)
SODIUM SERPL-SCNC: 139 MMOL/L (ref 133–143)
WBC # BLD AUTO: 5.1 K/UL (ref 4.3–11.1)

## 2023-11-02 PROCEDURE — 80053 COMPREHEN METABOLIC PANEL: CPT

## 2023-11-02 PROCEDURE — 99214 OFFICE O/P EST MOD 30 MIN: CPT | Performed by: NURSE PRACTITIONER

## 2023-11-02 PROCEDURE — 36415 COLL VENOUS BLD VENIPUNCTURE: CPT

## 2023-11-02 PROCEDURE — 84702 CHORIONIC GONADOTROPIN TEST: CPT

## 2023-11-02 PROCEDURE — 85025 COMPLETE CBC W/AUTO DIFF WBC: CPT

## 2023-11-02 ASSESSMENT — ENCOUNTER SYMPTOMS
SHORTNESS OF BREATH: 0
CHEST TIGHTNESS: 0
CONSTIPATION: 0
EYES NEGATIVE: 1
ABDOMINAL PAIN: 0
DIARRHEA: 0
TROUBLE SWALLOWING: 0
COLOR CHANGE: 0
NAUSEA: 0
WHEEZING: 0

## 2023-11-02 ASSESSMENT — PATIENT HEALTH QUESTIONNAIRE - PHQ9
SUM OF ALL RESPONSES TO PHQ QUESTIONS 1-9: 0
SUM OF ALL RESPONSES TO PHQ9 QUESTIONS 1 & 2: 0
2. FEELING DOWN, DEPRESSED OR HOPELESS: 0
SUM OF ALL RESPONSES TO PHQ QUESTIONS 1-9: 0
1. LITTLE INTEREST OR PLEASURE IN DOING THINGS: 0

## 2023-11-02 NOTE — PROGRESS NOTES
[facility]cology: Office Visit Established Patient    Chief Complaint   Patient presents with    Follow-up        Diagnosis: ER/MT/AR/Her+ right breast cancer   Right under implant/rib pain - same   S/p bilat mastectomies with recon at [facility] in [facility] - path sent to scanning      History of Present Illness:  Ms. Floyd Moe is a 29 y.o. female who returns today for management of breast cancer. The past medical history  is significant for asthma. She initially presented in August 2018 with a mass in her right lateral breast that had been present for approximately 8 years with no nipple discharge or skin changes. Initial workup, including right breast US on 8/27/18  identified a 3.5 cm mass with no suspicious axillary lymph nodes. On 9/11/2018 she underwent US guided core biopsy which identified IDC, grade 3, measuring 13 mm in maximal linear dimension. She saw genetics counselor for testing on 9/17/18 and  underwent egg collection and preservation. She subsequently underwent right partial mastectomy and sentinel lymph node biopsy on 9/24/18 which revealed stage IIb ([id number]), ER/MT/ AR+, HER-2 FISH positive, IDC at 9 o'clock. Surgical margins were  uninvolved by invasive carcinoma, however, DCIS was present at the lateral margin focally, 3 mm, focus of DCIS was also found 1 mm from deep margin and 2 mm from inferior margin. Micrometastasis measuring 13 mm was also found in 1/9 lymph nodes. No  micrometastatic disease was appreciated. She was then seen by Dr. Kimberli So, Oncologist at [facility] in [place], and on 10/22/18, she started C1 adjuvant TCHP. She developed neutropenic fever requiring hospitalization from  12/9/18 - 12/12/18 after C3. No source was found, but viral URI was presumed. C4 was delayed one week due to 70K platelet count. C4 was administered on 12/31/18 with carbo dose reduced to 700 mg (AUC 4.85).   Plan was to completed 6 cycles  of TCHP, with

## 2023-11-13 RX ORDER — EXEMESTANE 25 MG/1
TABLET ORAL
Qty: 90 TABLET | Refills: 1 | OUTPATIENT
Start: 2023-11-13

## 2024-01-12 ENCOUNTER — HOSPITAL ENCOUNTER (OUTPATIENT)
Dept: MAMMOGRAPHY | Age: 34
Discharge: HOME | End: 2024-01-12
Payer: COMMERCIAL

## 2024-01-12 DIAGNOSIS — Z91.89 AT RISK FOR OSTEOPOROSIS: ICD-10-CM

## 2024-01-12 PROCEDURE — 77080 DXA BONE DENSITY AXIAL: CPT

## 2024-01-23 ENCOUNTER — PROCEDURE VISIT (OUTPATIENT)
Dept: OBGYN CLINIC | Age: 34
End: 2024-01-23
Payer: COMMERCIAL

## 2024-01-23 ENCOUNTER — OFFICE VISIT (OUTPATIENT)
Dept: OBGYN CLINIC | Age: 34
End: 2024-01-23
Payer: COMMERCIAL

## 2024-01-23 VITALS
DIASTOLIC BLOOD PRESSURE: 68 MMHG | SYSTOLIC BLOOD PRESSURE: 104 MMHG | HEIGHT: 65 IN | BODY MASS INDEX: 27.42 KG/M2 | WEIGHT: 164.6 LBS

## 2024-01-23 DIAGNOSIS — N93.9 VAGINAL BLEEDING: Primary | ICD-10-CM

## 2024-01-23 PROCEDURE — 99213 OFFICE O/P EST LOW 20 MIN: CPT | Performed by: OBSTETRICS & GYNECOLOGY

## 2024-01-23 PROCEDURE — 76830 TRANSVAGINAL US NON-OB: CPT | Performed by: OBSTETRICS & GYNECOLOGY

## 2024-01-23 NOTE — PROGRESS NOTES
suppression.  Will communicate with and defer to oncology.   Will monitor IUD and lining in 3-4 mos again in office.

## 2024-02-12 ENCOUNTER — OFFICE VISIT (OUTPATIENT)
Dept: PRIMARY CARE CLINIC | Facility: CLINIC | Age: 34
End: 2024-02-12
Payer: COMMERCIAL

## 2024-02-12 VITALS
TEMPERATURE: 98.2 F | BODY MASS INDEX: 26.52 KG/M2 | HEIGHT: 66 IN | DIASTOLIC BLOOD PRESSURE: 82 MMHG | HEART RATE: 64 BPM | OXYGEN SATURATION: 99 % | WEIGHT: 165 LBS | SYSTOLIC BLOOD PRESSURE: 122 MMHG

## 2024-02-12 DIAGNOSIS — E53.8 LOW SERUM VITAMIN B12: ICD-10-CM

## 2024-02-12 DIAGNOSIS — R73.9 HYPERGLYCEMIA: Primary | ICD-10-CM

## 2024-02-12 DIAGNOSIS — E55.9 VITAMIN D DEFICIENCY: ICD-10-CM

## 2024-02-12 DIAGNOSIS — F41.9 ANXIETY: ICD-10-CM

## 2024-02-12 DIAGNOSIS — E78.00 ELEVATED LDL CHOLESTEROL LEVEL: ICD-10-CM

## 2024-02-12 PROCEDURE — 99214 OFFICE O/P EST MOD 30 MIN: CPT | Performed by: FAMILY MEDICINE

## 2024-02-12 NOTE — ASSESSMENT & PLAN NOTE
Has had a few elevated blood sugars, father with prediabetes.   Will check A1C with Dr. Mijares's blood work.

## 2024-02-12 NOTE — PATIENT INSTRUCTIONS
IT WAS GREAT TO SEE YOU TODAY!    LET ME KNOW WHEN YOU NEED A REFILL OF LORAZEPAM.    I WILL LET YOU KNOW THE RESULTS OF YOUR HEMOGLOBIN A1C FOR DIABETES AND YOUR VITAMIN D LEVEL.    I WILL SEE YOU AGAIN IN 6 MONTHS BUT PLEASE CALL WITH CONCERNS [phone]

## 2024-02-12 NOTE — PROGRESS NOTES
[facility]  Sena Smartndar, DO  [address], [address]  [place] [id number] [phone]         ASSESSMENT AND PLAN    Problem List Items Addressed This Visit          Other    Vitamin D deficiency      Will recheck labs with Dr. Mijares's blood work.         Relevant Orders    Vitamin D 25 Hydroxy    Low serum vitamin B12       Will recheck labs with Dr. Mijares's blood work.         Relevant Orders    Vitamin B12    Anxiety      Stable on Lorazepam as needed.  Does not need a refill.         Elevated LDL cholesterol level       Will recheck labs with Dr. Mijares's blood work.         Relevant Orders    Lipid Panel    Hyperglycemia - Primary      Has had a few elevated blood sugars, father with prediabetes.   Will check A1C with Dr. Mijares's blood work.         Relevant Orders    Hemoglobin A1C        The diagnoses and plan were discussed with the patient, who verbalizes understanding and agrees with plan.  All questions answered.    Chief Complaint    Chief Complaint   Patient presents with    3 Month Follow-Up     No concerns.        HISTORY OF PRESENT ILLNESS    33 y.o. female with history of breast cancer presents today for follow up.  Switched from Zoladex to Tamoxifen.  States that she saw Dr. Ritter, who told her he just thought her period was coming back, so will likely have to change back to her old medication regimen per Dr. Mijares.  Meets with her at the end of the month.  Using Lorazepam as needed for anxiety.  Does not need a refill at this time.  Has noticed that she has gained some weight, eats a healthy diet and is active, likely medication induced.  Asks my opinion on the newer weight loss medications like Ozempic.  States that she has not had issues with diabetes but notes that her father has prediabetes.    PAST MEDICAL HISTORY    Past Medical History:   Diagnosis Date    Anxiety     Asthma     Breast cancer (HCC) 2019    Cancer (HCC) 09/2018

## 2024-03-01 DIAGNOSIS — E78.00 ELEVATED LDL CHOLESTEROL LEVEL: ICD-10-CM

## 2024-03-01 DIAGNOSIS — Z17.0 MALIGNANT NEOPLASM OF BREAST IN FEMALE, ESTROGEN RECEPTOR POSITIVE, UNSPECIFIED LATERALITY, UNSPECIFIED SITE OF BREAST (HCC): ICD-10-CM

## 2024-03-01 DIAGNOSIS — E55.9 VITAMIN D DEFICIENCY: ICD-10-CM

## 2024-03-01 DIAGNOSIS — R73.9 HYPERGLYCEMIA: ICD-10-CM

## 2024-03-01 DIAGNOSIS — C50.411 MALIGNANT NEOPLASM OF UPPER-OUTER QUADRANT OF RIGHT BREAST IN FEMALE, ESTROGEN RECEPTOR POSITIVE (HCC): Primary | ICD-10-CM

## 2024-03-01 DIAGNOSIS — E53.8 LOW SERUM VITAMIN B12: ICD-10-CM

## 2024-03-01 DIAGNOSIS — Z17.0 MALIGNANT NEOPLASM OF UPPER-OUTER QUADRANT OF RIGHT BREAST IN FEMALE, ESTROGEN RECEPTOR POSITIVE (HCC): Primary | ICD-10-CM

## 2024-03-01 DIAGNOSIS — C50.919 MALIGNANT NEOPLASM OF BREAST IN FEMALE, ESTROGEN RECEPTOR POSITIVE, UNSPECIFIED LATERALITY, UNSPECIFIED SITE OF BREAST (HCC): ICD-10-CM

## 2024-03-04 ENCOUNTER — OFFICE VISIT (OUTPATIENT)
Dept: ONCOLOGY | Age: 34
End: 2024-03-04
Payer: COMMERCIAL

## 2024-03-04 ENCOUNTER — CLINICAL DOCUMENTATION (OUTPATIENT)
Dept: CASE MANAGEMENT | Age: 34
End: 2024-03-04

## 2024-03-04 ENCOUNTER — HOSPITAL ENCOUNTER (OUTPATIENT)
Dept: LAB | Age: 34
Discharge: HOME | End: 2024-03-07
Payer: COMMERCIAL

## 2024-03-04 VITALS
BODY MASS INDEX: 27.39 KG/M2 | TEMPERATURE: 98.4 F | WEIGHT: 164.4 LBS | SYSTOLIC BLOOD PRESSURE: 126 MMHG | RESPIRATION RATE: 16 BRPM | HEIGHT: 65 IN | HEART RATE: 54 BPM | OXYGEN SATURATION: 100 % | DIASTOLIC BLOOD PRESSURE: 84 MMHG

## 2024-03-04 DIAGNOSIS — C50.411 MALIGNANT NEOPLASM OF UPPER-OUTER QUADRANT OF RIGHT BREAST IN FEMALE, ESTROGEN RECEPTOR POSITIVE (HCC): Primary | ICD-10-CM

## 2024-03-04 DIAGNOSIS — Z79.810 LONG-TERM CURRENT USE OF TAMOXIFEN: ICD-10-CM

## 2024-03-04 DIAGNOSIS — R53.83 FATIGUE DUE TO TREATMENT: ICD-10-CM

## 2024-03-04 DIAGNOSIS — Z17.0 MALIGNANT NEOPLASM OF UPPER-OUTER QUADRANT OF RIGHT BREAST IN FEMALE, ESTROGEN RECEPTOR POSITIVE (HCC): Primary | ICD-10-CM

## 2024-03-04 DIAGNOSIS — C50.411 MALIGNANT NEOPLASM OF UPPER-OUTER QUADRANT OF RIGHT BREAST IN FEMALE, ESTROGEN RECEPTOR POSITIVE (HCC): ICD-10-CM

## 2024-03-04 DIAGNOSIS — M25.50 ARTHRALGIA, UNSPECIFIED JOINT: ICD-10-CM

## 2024-03-04 DIAGNOSIS — Z17.0 MALIGNANT NEOPLASM OF UPPER-OUTER QUADRANT OF RIGHT BREAST IN FEMALE, ESTROGEN RECEPTOR POSITIVE (HCC): ICD-10-CM

## 2024-03-04 DIAGNOSIS — Z17.0 MALIGNANT NEOPLASM OF BREAST IN FEMALE, ESTROGEN RECEPTOR POSITIVE, UNSPECIFIED LATERALITY, UNSPECIFIED SITE OF BREAST (HCC): ICD-10-CM

## 2024-03-04 DIAGNOSIS — C50.919 MALIGNANT NEOPLASM OF BREAST IN FEMALE, ESTROGEN RECEPTOR POSITIVE, UNSPECIFIED LATERALITY, UNSPECIFIED SITE OF BREAST (HCC): ICD-10-CM

## 2024-03-04 LAB
ALBUMIN SERPL-MCNC: 3.8 G/DL (ref 3.5–5)
ALBUMIN/GLOB SERPL: 1.2 (ref 0.4–1.6)
ALP SERPL-CCNC: 46 U/L (ref 50–136)
ALT SERPL-CCNC: 23 U/L (ref 12–65)
ANION GAP SERPL CALC-SCNC: 4 MMOL/L (ref 2–11)
AST SERPL-CCNC: 16 U/L (ref 15–37)
BASOPHILS # BLD: 0 K/UL (ref 0–0.2)
BASOPHILS NFR BLD: 1 % (ref 0–2)
BILIRUB SERPL-MCNC: 0.4 MG/DL (ref 0.2–1.1)
BUN SERPL-MCNC: 9 MG/DL (ref 6–23)
CALCIUM SERPL-MCNC: 9 MG/DL (ref 8.3–10.4)
CHLORIDE SERPL-SCNC: 108 MMOL/L (ref 103–113)
CO2 SERPL-SCNC: 28 MMOL/L (ref 21–32)
CREAT SERPL-MCNC: 0.8 MG/DL (ref 0.6–1)
DIFFERENTIAL METHOD BLD: NORMAL
EOSINOPHIL # BLD: 0.3 K/UL (ref 0–0.8)
EOSINOPHIL NFR BLD: 6 % (ref 0.5–7.8)
ERYTHROCYTE [DISTWIDTH] IN BLOOD BY AUTOMATED COUNT: 11.9 % (ref 11.9–14.6)
GLOBULIN SER CALC-MCNC: 3.3 G/DL (ref 2.8–4.5)
GLUCOSE SERPL-MCNC: 104 MG/DL (ref 65–100)
HCT VFR BLD AUTO: 39.6 % (ref 35.8–46.3)
HGB BLD-MCNC: 13.2 G/DL (ref 11.7–15.4)
IMM GRANULOCYTES # BLD AUTO: 0 K/UL (ref 0–0.5)
IMM GRANULOCYTES NFR BLD AUTO: 0 % (ref 0–5)
LYMPHOCYTES # BLD: 1.3 K/UL (ref 0.5–4.6)
LYMPHOCYTES NFR BLD: 24 % (ref 13–44)
MCH RBC QN AUTO: 30.3 PG (ref 26.1–32.9)
MCHC RBC AUTO-ENTMCNC: 33.3 G/DL (ref 31.4–35)
MCV RBC AUTO: 90.8 FL (ref 82–102)
MONOCYTES # BLD: 0.4 K/UL (ref 0.1–1.3)
MONOCYTES NFR BLD: 7 % (ref 4–12)
NEUTS SEG # BLD: 3.2 K/UL (ref 1.7–8.2)
NEUTS SEG NFR BLD: 62 % (ref 43–78)
NRBC # BLD: 0 K/UL (ref 0–0.2)
PLATELET # BLD AUTO: 216 K/UL (ref 150–450)
PMV BLD AUTO: 10.7 FL (ref 9.4–12.3)
POTASSIUM SERPL-SCNC: 4.1 MMOL/L (ref 3.5–5.1)
PROT SERPL-MCNC: 7.1 G/DL (ref 6.3–8.2)
RBC # BLD AUTO: 4.36 M/UL (ref 4.05–5.2)
SODIUM SERPL-SCNC: 140 MMOL/L (ref 136–146)
WBC # BLD AUTO: 5.1 K/UL (ref 4.3–11.1)

## 2024-03-04 PROCEDURE — 99214 OFFICE O/P EST MOD 30 MIN: CPT | Performed by: NURSE PRACTITIONER

## 2024-03-04 PROCEDURE — 80053 COMPREHEN METABOLIC PANEL: CPT

## 2024-03-04 PROCEDURE — 85025 COMPLETE CBC W/AUTO DIFF WBC: CPT

## 2024-03-04 PROCEDURE — 36415 COLL VENOUS BLD VENIPUNCTURE: CPT

## 2024-03-04 ASSESSMENT — ENCOUNTER SYMPTOMS
CONSTIPATION: 0
ABDOMINAL PAIN: 0
TROUBLE SWALLOWING: 0
NAUSEA: 0
WHEEZING: 0
EYES NEGATIVE: 1
CHEST TIGHTNESS: 0
COLOR CHANGE: 0
SHORTNESS OF BREATH: 0
DIARRHEA: 0

## 2024-03-04 ASSESSMENT — PATIENT HEALTH QUESTIONNAIRE - PHQ9
SUM OF ALL RESPONSES TO PHQ QUESTIONS 1-9: 0
2. FEELING DOWN, DEPRESSED OR HOPELESS: 0
SUM OF ALL RESPONSES TO PHQ9 QUESTIONS 1 & 2: 0
SUM OF ALL RESPONSES TO PHQ QUESTIONS 1-9: 0
SUM OF ALL RESPONSES TO PHQ QUESTIONS 1-9: 0
1. LITTLE INTEREST OR PLEASURE IN DOING THINGS: 0
SUM OF ALL RESPONSES TO PHQ QUESTIONS 1-9: 0

## 2024-03-04 NOTE — PROGRESS NOTES
[facility]: Office Visit Established Patient    Chief Complaint   Patient presents with    Follow-up        Diagnosis: ER/WI/AR/Her+ right breast cancer   Right under implant/rib pain - same   S/p bilat mastectomies with recon at [facility] in [place] - path sent to scanning      History of Present Illness:  Ms. Momin is a 33 y.o. female who returns today for management of breast cancer.  The past medical history  is significant for asthma.  She initially presented in August 2018 with a mass in her right lateral breast that had been present for approximately 8 years with no nipple discharge or skin changes.  Initial workup, including right breast US on 8/27/18  identified a 3.5 cm mass with no suspicious axillary lymph nodes.  On 9/11/2018 she underwent US guided core biopsy which identified IDC, grade 3, measuring 13 mm in maximal linear dimension.  She saw genetics counselor for testing on 9/17/18 and  underwent egg collection and preservation.  She subsequently underwent right partial mastectomy and sentinel lymph node biopsy on 9/24/18 which revealed stage IIb ([id number]), ER/WI/ AR+, HER-2 FISH positive, IDC at 9 o'clock.  Surgical margins were  uninvolved by invasive carcinoma, however, DCIS was present at the lateral margin focally, 3 mm, focus of DCIS was also found 1 mm from deep margin and 2 mm from inferior margin.  Micrometastasis measuring 13 mm was also found in 1/9 lymph nodes.  No  micrometastatic disease was appreciated.  She was then seen by Dr. Mike Gilliam, Oncologist at [facility] in [place], and on 10/22/18, she started C1 adjuvant TCHP.  She developed neutropenic fever requiring hospitalization from  12/9/18 - 12/12/18 after C3.  No source was found, but viral URI was presumed.  C4 was delayed one week due to 70K platelet count.  C4 was administered on 12/31/18 with carbo dose reduced to 700 mg (AUC 4.85).  Plan was to completed 6 cycles  of TCHP, with

## 2024-03-05 ENCOUNTER — HOSPITAL ENCOUNTER (OUTPATIENT)
Dept: LAB | Age: 34
Discharge: HOME | End: 2024-03-08
Payer: COMMERCIAL

## 2024-03-05 DIAGNOSIS — Z17.0 MALIGNANT NEOPLASM OF UPPER-OUTER QUADRANT OF RIGHT BREAST IN FEMALE, ESTROGEN RECEPTOR POSITIVE (HCC): ICD-10-CM

## 2024-03-05 DIAGNOSIS — C50.411 MALIGNANT NEOPLASM OF UPPER-OUTER QUADRANT OF RIGHT BREAST IN FEMALE, ESTROGEN RECEPTOR POSITIVE (HCC): ICD-10-CM

## 2024-03-05 DIAGNOSIS — Z79.810 LONG-TERM CURRENT USE OF TAMOXIFEN: ICD-10-CM

## 2024-03-05 LAB
ESTRADIOL SERPL-MCNC: 448.32 PG/ML
FSH SERPL-ACNC: 11.1 MIU/ML
LH SERPL-ACNC: 7.7 MIU/ML
Lab: NORMAL
Lab: NORMAL
REFERENCE LAB: NORMAL

## 2024-03-05 PROCEDURE — 83001 ASSAY OF GONADOTROPIN (FSH): CPT

## 2024-03-05 PROCEDURE — 36415 COLL VENOUS BLD VENIPUNCTURE: CPT

## 2024-03-05 PROCEDURE — 83002 ASSAY OF GONADOTROPIN (LH): CPT

## 2024-03-05 PROCEDURE — 82670 ASSAY OF TOTAL ESTRADIOL: CPT

## 2024-03-06 ENCOUNTER — PATIENT MESSAGE (OUTPATIENT)
Dept: ONCOLOGY | Age: 34
End: 2024-03-06

## 2024-03-08 ENCOUNTER — TELEPHONE (OUTPATIENT)
Dept: ONCOLOGY | Age: 34
End: 2024-03-08

## 2024-03-08 NOTE — TELEPHONE ENCOUNTER
Call back to Raisa Rascon to notify that there are pathology reports from [facility] in [place], but she will need to call them for the results. Department of Pathology phone number supplied: [phone].  She appreciated the call back and the information

## 2024-03-08 NOTE — TELEPHONE ENCOUNTER
Physician provider: Lucrecia Mijares MD  Reason for today's call: pathology report   Last office visit:3/4/2024    Patient notified that their information will be routed to the [facility] clinical triage team for review. Patient is advised that they will receive a phone call from the triage department.       Raisa calling from [facility] on the  behalf of the PT.   Raisa is requesting a alternate pathology report other than the last report  they received from 9/24/2018 if possible.    Fax [phone]  Phone [phone]  Ext:[phone]

## 2024-03-13 ENCOUNTER — HOSPITAL ENCOUNTER (OUTPATIENT)
Dept: INFUSION THERAPY | Age: 34
Setting detail: INFUSION SERIES
Discharge: HOME | End: 2024-03-13
Payer: COMMERCIAL

## 2024-03-13 ENCOUNTER — HOSPITAL ENCOUNTER (OUTPATIENT)
Dept: LAB | Age: 34
Discharge: HOME | End: 2024-03-16

## 2024-03-13 VITALS
SYSTOLIC BLOOD PRESSURE: 128 MMHG | HEART RATE: 63 BPM | TEMPERATURE: 98 F | OXYGEN SATURATION: 97 % | RESPIRATION RATE: 16 BRPM | DIASTOLIC BLOOD PRESSURE: 82 MMHG

## 2024-03-13 DIAGNOSIS — C50.919 MALIGNANT NEOPLASM OF FEMALE BREAST, UNSPECIFIED ESTROGEN RECEPTOR STATUS, UNSPECIFIED LATERALITY, UNSPECIFIED SITE OF BREAST (HCC): Primary | ICD-10-CM

## 2024-03-13 LAB — HCG UR QL: NEGATIVE

## 2024-03-13 PROCEDURE — 6360000002 HC RX W HCPCS: Performed by: INTERNAL MEDICINE

## 2024-03-13 PROCEDURE — 81025 URINE PREGNANCY TEST: CPT

## 2024-03-13 PROCEDURE — 96402 CHEMO HORMON ANTINEOPL SQ/IM: CPT

## 2024-03-13 RX ORDER — SODIUM CHLORIDE 9 MG/ML
INJECTION, SOLUTION INTRAVENOUS CONTINUOUS
OUTPATIENT
Start: 2024-04-03

## 2024-03-13 RX ORDER — DIPHENHYDRAMINE HYDROCHLORIDE 50 MG/ML
50 INJECTION INTRAMUSCULAR; INTRAVENOUS
OUTPATIENT
Start: 2024-04-03

## 2024-03-13 RX ORDER — EPINEPHRINE 1 MG/ML
0.3 INJECTION, SOLUTION, CONCENTRATE INTRAVENOUS PRN
OUTPATIENT
Start: 2024-04-03

## 2024-03-13 RX ORDER — ALBUTEROL SULFATE 90 UG/1
4 AEROSOL, METERED RESPIRATORY (INHALATION) PRN
OUTPATIENT
Start: 2024-04-03

## 2024-03-13 RX ORDER — ONDANSETRON 2 MG/ML
8 INJECTION INTRAMUSCULAR; INTRAVENOUS
OUTPATIENT
Start: 2024-04-03

## 2024-03-13 RX ORDER — ACETAMINOPHEN 325 MG/1
650 TABLET ORAL
OUTPATIENT
Start: 2024-04-03

## 2024-03-13 RX ADMIN — GOSERELIN ACETATE 3.6 MG: 3.6 IMPLANT SUBCUTANEOUS at 15:23

## 2024-03-13 NOTE — PROGRESS NOTES
Arrived to the Infusion Center.  Zoladex injection completed.   Provided education on same    Patient instructed to report any side affects to ordering provider.  Patient tolerated well.   Any issues or concerns during appointment: none.  Patient aware of next infusion appointment on 04/10/2024 (date) at 1500 (time).  Discharged ambulatory.

## 2024-03-15 ENCOUNTER — TELEPHONE (OUTPATIENT)
Dept: ONCOLOGY | Age: 34
End: 2024-03-15

## 2024-03-15 NOTE — TELEPHONE ENCOUNTER
Call back to [facility]  to let her know we have not received the paperwork. She said she sent it earlier today to correct fax #. Advised her that our faxes are online and sometimes take a little bit to come through but asked her to fax again just to be sure.

## 2024-03-15 NOTE — TELEPHONE ENCOUNTER
Physician provider: Lucrecia Mijares MD  Reason for today's call: Name Verification Form  Last office visit: n/a    Raisa gallegos/[facility] called to F/U on fax sent. She is asking for: Name Verification Form for Pt to be faxed to: [phone]. She can be reached at: [phone] ext [phone].

## 2024-03-22 ENCOUNTER — APPOINTMENT (RX ONLY)
Dept: URBAN - METROPOLITAN AREA CLINIC 330 | Facility: CLINIC | Age: 34
Setting detail: DERMATOLOGY
End: 2024-03-22

## 2024-03-22 DIAGNOSIS — D18.0 HEMANGIOMA: ICD-10-CM

## 2024-03-22 DIAGNOSIS — D22 MELANOCYTIC NEVI: ICD-10-CM

## 2024-03-22 DIAGNOSIS — L81.4 OTHER MELANIN HYPERPIGMENTATION: ICD-10-CM | Status: STABLE

## 2024-03-22 DIAGNOSIS — D485 NEOPLASM OF UNCERTAIN BEHAVIOR OF SKIN: ICD-10-CM | Status: INADEQUATELY CONTROLLED

## 2024-03-22 DIAGNOSIS — L82.1 OTHER SEBORRHEIC KERATOSIS: ICD-10-CM

## 2024-03-22 DIAGNOSIS — L30.9 DERMATITIS, UNSPECIFIED: ICD-10-CM | Status: RESOLVING

## 2024-03-22 DIAGNOSIS — I78.8 OTHER DISEASES OF CAPILLARIES: ICD-10-CM | Status: INADEQUATELY CONTROLLED

## 2024-03-22 DIAGNOSIS — L57.8 OTHER SKIN CHANGES DUE TO CHRONIC EXPOSURE TO NONIONIZING RADIATION: ICD-10-CM | Status: STABLE

## 2024-03-22 PROBLEM — D18.01 HEMANGIOMA OF SKIN AND SUBCUTANEOUS TISSUE: Status: ACTIVE | Noted: 2024-03-22

## 2024-03-22 PROBLEM — D48.5 NEOPLASM OF UNCERTAIN BEHAVIOR OF SKIN: Status: ACTIVE | Noted: 2024-03-22

## 2024-03-22 PROBLEM — D22.5 MELANOCYTIC NEVI OF TRUNK: Status: ACTIVE | Noted: 2024-03-22

## 2024-03-22 PROCEDURE — ? ADDITIONAL NOTES

## 2024-03-22 PROCEDURE — ? TREATMENT REGIMEN

## 2024-03-22 PROCEDURE — ? SUNSCREEN RECOMMENDATIONS

## 2024-03-22 PROCEDURE — ? BIOPSY BY SHAVE METHOD

## 2024-03-22 PROCEDURE — ? SUNSCREEN TREATMENT REGIMEN

## 2024-03-22 PROCEDURE — ? FULL BODY SKIN EXAM

## 2024-03-22 PROCEDURE — 11102 TANGNTL BX SKIN SINGLE LES: CPT

## 2024-03-22 PROCEDURE — 99203 OFFICE O/P NEW LOW 30 MIN: CPT | Mod: 25

## 2024-03-22 PROCEDURE — ? COUNSELING

## 2024-03-22 ASSESSMENT — LOCATION DETAILED DESCRIPTION DERM
LOCATION DETAILED: LEFT INFERIOR MEDIAL UPPER BACK
LOCATION DETAILED: RIGHT SUPERIOR MEDIAL BUCCAL CHEEK
LOCATION DETAILED: LEFT AREOLA
LOCATION DETAILED: LEFT DISTAL PRETIBIAL REGION
LOCATION DETAILED: LEFT INFERIOR MEDIAL MALAR CHEEK
LOCATION DETAILED: LEFT SUPERIOR MEDIAL MIDBACK
LOCATION DETAILED: INFERIOR THORACIC SPINE
LOCATION DETAILED: LEFT PROXIMAL PRETIBIAL REGION
LOCATION DETAILED: LEFT MEDIAL UPPER BACK
LOCATION DETAILED: RIGHT PROXIMAL PRETIBIAL REGION
LOCATION DETAILED: LEFT INFERIOR UPPER BACK
LOCATION DETAILED: RIGHT DISTAL PRETIBIAL REGION
LOCATION DETAILED: RIGHT LOWER CUTANEOUS LIP

## 2024-03-22 ASSESSMENT — LOCATION SIMPLE DESCRIPTION DERM
LOCATION SIMPLE: LEFT UPPER BACK
LOCATION SIMPLE: RIGHT LIP
LOCATION SIMPLE: LEFT BREAST
LOCATION SIMPLE: RIGHT PRETIBIAL REGION
LOCATION SIMPLE: LEFT PRETIBIAL REGION
LOCATION SIMPLE: RIGHT CHEEK
LOCATION SIMPLE: UPPER BACK
LOCATION SIMPLE: LEFT LOWER BACK
LOCATION SIMPLE: LEFT CHEEK

## 2024-03-22 ASSESSMENT — LOCATION ZONE DERM
LOCATION ZONE: LIP
LOCATION ZONE: TRUNK
LOCATION ZONE: FACE
LOCATION ZONE: LEG

## 2024-03-22 ASSESSMENT — BSA RASH: BSA RASH: 0

## 2024-03-22 ASSESSMENT — ITCH NUMERIC RATING SCALE: HOW SEVERE IS YOUR ITCHING?: 0

## 2024-03-22 NOTE — PROCEDURE: BIOPSY BY SHAVE METHOD
Detail Level: Detailed
Depth Of Biopsy: dermis
Was A Bandage Applied: Yes
Size Of Lesion In Cm: 0.4
X Size Of Lesion In Cm: 0
Biopsy Type: H and E
Biopsy Method: Personna blade
Anesthesia Type: 1% lidocaine with epinephrine and a 1:10 solution of 8.4% sodium bicarbonate
Anesthesia Volume In Cc: 0.5
Hemostasis: Aluminum Chloride
Wound Care: Petrolatum
Dressing: bandage
Destruction After The Procedure: No
Type Of Destruction Used: Curettage
Curettage Text: The wound bed was treated with curettage after the biopsy was performed.
Cryotherapy Text: The wound bed was treated with cryotherapy after the biopsy was performed.
Electrodesiccation Text: The wound bed was treated with electrodesiccation after the biopsy was performed.
Electrodesiccation And Curettage Text: The wound bed was treated with electrodesiccation and curettage after the biopsy was performed.
Silver Nitrate Text: The wound bed was treated with silver nitrate after the biopsy was performed.
Consent: Written consent was obtained and risk was reviewed  per signed  consent form. Biopsy was performed with patient verbal permission.
Post-care instructions were given and  reviewed with the patient in detail. Patient is to keep the biopsy site dry overnight, and then apply healing ointment daily until healed. Patient is instructed to call for any questions or problems.
Notification Instructions: Patient will be notified of biopsy results. However, patient instructed to call the office if not contacted within 2 weeks.
Billing Type: Third-Party Bill
Information: Selecting Yes will display possible errors in your note based on the variables you have selected. This validation is only offered as a suggestion for you. PLEASE NOTE THAT THE VALIDATION TEXT WILL BE REMOVED WHEN YOU FINALIZE YOUR NOTE. IF YOU WANT TO FAX A PRELIMINARY NOTE YOU WILL NEED TO TOGGLE THIS TO 'NO' IF YOU DO NOT WANT IT IN YOUR FAXED NOTE.

## 2024-03-22 NOTE — PROCEDURE: TREATMENT REGIMEN
Detail Level: Detailed
Plan: Recommended using Pepcid and Zyrtec when flaring up\\nWill refer to allergist if pt wishes

## 2024-04-10 ENCOUNTER — APPOINTMENT (OUTPATIENT)
Dept: INFUSION THERAPY | Age: 34
End: 2024-04-10
Payer: COMMERCIAL

## 2024-04-11 ENCOUNTER — HOSPITAL ENCOUNTER (OUTPATIENT)
Dept: INFUSION THERAPY | Age: 34
Setting detail: INFUSION SERIES
Discharge: HOME | End: 2024-04-11
Payer: COMMERCIAL

## 2024-04-11 ENCOUNTER — HOSPITAL ENCOUNTER (OUTPATIENT)
Dept: LAB | Age: 34
Discharge: HOME | End: 2024-04-11
Payer: COMMERCIAL

## 2024-04-11 VITALS
DIASTOLIC BLOOD PRESSURE: 73 MMHG | OXYGEN SATURATION: 99 % | RESPIRATION RATE: 16 BRPM | SYSTOLIC BLOOD PRESSURE: 131 MMHG | HEART RATE: 56 BPM | TEMPERATURE: 98.2 F

## 2024-04-11 DIAGNOSIS — C50.919 MALIGNANT NEOPLASM OF FEMALE BREAST, UNSPECIFIED ESTROGEN RECEPTOR STATUS, UNSPECIFIED LATERALITY, UNSPECIFIED SITE OF BREAST (HCC): Primary | ICD-10-CM

## 2024-04-11 DIAGNOSIS — C50.919 MALIGNANT NEOPLASM OF BREAST IN FEMALE, ESTROGEN RECEPTOR POSITIVE, UNSPECIFIED LATERALITY, UNSPECIFIED SITE OF BREAST (HCC): ICD-10-CM

## 2024-04-11 DIAGNOSIS — Z17.0 MALIGNANT NEOPLASM OF BREAST IN FEMALE, ESTROGEN RECEPTOR POSITIVE, UNSPECIFIED LATERALITY, UNSPECIFIED SITE OF BREAST (HCC): ICD-10-CM

## 2024-04-11 DIAGNOSIS — Z79.811 AROMATASE INHIBITOR USE: Primary | ICD-10-CM

## 2024-04-11 LAB — HCG SERPL QL: NEGATIVE

## 2024-04-11 PROCEDURE — 84703 CHORIONIC GONADOTROPIN ASSAY: CPT

## 2024-04-11 PROCEDURE — 36415 COLL VENOUS BLD VENIPUNCTURE: CPT

## 2024-04-11 PROCEDURE — 96402 CHEMO HORMON ANTINEOPL SQ/IM: CPT

## 2024-04-11 PROCEDURE — 6360000002 HC RX W HCPCS: Performed by: INTERNAL MEDICINE

## 2024-04-11 RX ORDER — DIPHENHYDRAMINE HYDROCHLORIDE 50 MG/ML
50 INJECTION INTRAMUSCULAR; INTRAVENOUS
OUTPATIENT
Start: 2024-05-09

## 2024-04-11 RX ORDER — ALBUTEROL SULFATE 90 UG/1
4 AEROSOL, METERED RESPIRATORY (INHALATION) PRN
OUTPATIENT
Start: 2024-05-09

## 2024-04-11 RX ORDER — EPINEPHRINE 1 MG/ML
0.3 INJECTION, SOLUTION, CONCENTRATE INTRAVENOUS PRN
OUTPATIENT
Start: 2024-05-09

## 2024-04-11 RX ORDER — ONDANSETRON 2 MG/ML
8 INJECTION INTRAMUSCULAR; INTRAVENOUS
OUTPATIENT
Start: 2024-05-09

## 2024-04-11 RX ORDER — ACETAMINOPHEN 325 MG/1
650 TABLET ORAL
OUTPATIENT
Start: 2024-05-09

## 2024-04-11 RX ORDER — SODIUM CHLORIDE 9 MG/ML
INJECTION, SOLUTION INTRAVENOUS CONTINUOUS
OUTPATIENT
Start: 2024-05-09

## 2024-04-11 RX ADMIN — GOSERELIN ACETATE 3.6 MG: 3.6 IMPLANT SUBCUTANEOUS at 09:10

## 2024-04-11 NOTE — PROGRESS NOTES
Arrived to the Infusion Center.  Zoladex completed.   Patient instructed to report any side affects to ordering provider.  Patient tolerated without complication.   Any issues or concerns during appointment: No.  Patient aware of next infusion appointment on 05/08/24 (date) at 1530 (time).  Discharged ambulatory.

## 2024-05-08 ENCOUNTER — HOSPITAL ENCOUNTER (OUTPATIENT)
Dept: INFUSION THERAPY | Age: 34
Setting detail: INFUSION SERIES
Discharge: HOME | End: 2024-05-08
Payer: COMMERCIAL

## 2024-05-08 ENCOUNTER — HOSPITAL ENCOUNTER (OUTPATIENT)
Dept: LAB | Age: 34
Discharge: HOME | End: 2024-05-11
Payer: COMMERCIAL

## 2024-05-08 VITALS
HEART RATE: 68 BPM | OXYGEN SATURATION: 99 % | TEMPERATURE: 98 F | DIASTOLIC BLOOD PRESSURE: 84 MMHG | SYSTOLIC BLOOD PRESSURE: 129 MMHG | RESPIRATION RATE: 16 BRPM

## 2024-05-08 DIAGNOSIS — Z79.811 AROMATASE INHIBITOR USE: ICD-10-CM

## 2024-05-08 DIAGNOSIS — Z17.0 MALIGNANT NEOPLASM OF BREAST IN FEMALE, ESTROGEN RECEPTOR POSITIVE, UNSPECIFIED LATERALITY, UNSPECIFIED SITE OF BREAST (HCC): ICD-10-CM

## 2024-05-08 DIAGNOSIS — C50.919 MALIGNANT NEOPLASM OF BREAST IN FEMALE, ESTROGEN RECEPTOR POSITIVE, UNSPECIFIED LATERALITY, UNSPECIFIED SITE OF BREAST (HCC): ICD-10-CM

## 2024-05-08 DIAGNOSIS — C50.919 MALIGNANT NEOPLASM OF FEMALE BREAST, UNSPECIFIED ESTROGEN RECEPTOR STATUS, UNSPECIFIED LATERALITY, UNSPECIFIED SITE OF BREAST (HCC): Primary | ICD-10-CM

## 2024-05-08 LAB — HCG UR QL: NEGATIVE

## 2024-05-08 PROCEDURE — 96402 CHEMO HORMON ANTINEOPL SQ/IM: CPT

## 2024-05-08 PROCEDURE — 81025 URINE PREGNANCY TEST: CPT

## 2024-05-08 PROCEDURE — 6360000002 HC RX W HCPCS: Performed by: INTERNAL MEDICINE

## 2024-05-08 RX ORDER — ONDANSETRON 2 MG/ML
8 INJECTION INTRAMUSCULAR; INTRAVENOUS
OUTPATIENT
Start: 2024-06-05

## 2024-05-08 RX ORDER — ALBUTEROL SULFATE 90 UG/1
4 AEROSOL, METERED RESPIRATORY (INHALATION) PRN
OUTPATIENT
Start: 2024-06-05

## 2024-05-08 RX ORDER — EPINEPHRINE 1 MG/ML
0.3 INJECTION, SOLUTION, CONCENTRATE INTRAVENOUS PRN
OUTPATIENT
Start: 2024-06-05

## 2024-05-08 RX ORDER — DIPHENHYDRAMINE HYDROCHLORIDE 50 MG/ML
50 INJECTION INTRAMUSCULAR; INTRAVENOUS
OUTPATIENT
Start: 2024-06-05

## 2024-05-08 RX ORDER — ACETAMINOPHEN 325 MG/1
650 TABLET ORAL
OUTPATIENT
Start: 2024-06-05

## 2024-05-08 RX ORDER — SODIUM CHLORIDE 9 MG/ML
INJECTION, SOLUTION INTRAVENOUS CONTINUOUS
OUTPATIENT
Start: 2024-06-05

## 2024-05-08 RX ADMIN — GOSERELIN ACETATE 3.6 MG: 3.6 IMPLANT SUBCUTANEOUS at 15:42

## 2024-05-08 NOTE — PROGRESS NOTES
Arrived to the Infusion Center.  ZOLADEX completed. Patient tolerated well.   Any issues or concerns during appointment: none.  Patient instructed to call provider with temperature of 100.4 or greater or nausea/vomiting/ diarrhea or pain not controlled by medications.  Discharged ambulatory.

## 2024-05-17 LAB
Lab: NORMAL
Lab: NORMAL
REFERENCE LAB: NORMAL

## 2024-05-22 NOTE — PROGRESS NOTES
The patient is a 34 y.o.  who is seen for US and Follow up of vaginal bleeding while on Tamoxifen.  Paraguard IUD Check. Denies pain and discomfort as of current date. Patient taking Tamoxifen and is now on Goserelin as of current date. She has had no further bleeding since our last visit.  She reports that she was restarted on Zoladex shortly after that appt due to my concerns of incomplete suppression.  She states her serum estrogen was very elevated at that point.  Since that time she reports menopausal type symptoms and more typical effects of estrogen suppression.  She additionally reports that she will be finishing all therapy at the end of the year.      I have reviewed the ultrasound findings and in conjunction with the ultrasonographer have generated comments included with the report.  These finding and implications have been reviewed with the patient at the time of the exam.      She is counseled that her IUD is adequate for contraception and that her lining is now suppressed.  Ovarian cystic activity is resolved also.      In discussion about her resumption of cycling when stopping therapy, she is advised that I expect her to resume cycling in a manner similar to prior to her diagnosis but will wait and see.  AOK.  Reassured regarding concerns    Patient counseled face to face for 30 minutes regarding her condition, expectations and disposition with greater than 50% of the time spent in this way.

## 2024-05-28 ENCOUNTER — OFFICE VISIT (OUTPATIENT)
Dept: OBGYN CLINIC | Age: 34
End: 2024-05-28
Payer: COMMERCIAL

## 2024-05-28 ENCOUNTER — PROCEDURE VISIT (OUTPATIENT)
Dept: OBGYN CLINIC | Age: 34
End: 2024-05-28
Payer: COMMERCIAL

## 2024-05-28 VITALS — DIASTOLIC BLOOD PRESSURE: 72 MMHG | SYSTOLIC BLOOD PRESSURE: 119 MMHG | BODY MASS INDEX: 27.39 KG/M2 | WEIGHT: 164.6 LBS

## 2024-05-28 DIAGNOSIS — Z30.431 IUD CHECK UP: ICD-10-CM

## 2024-05-28 DIAGNOSIS — R23.2 HOT FLASHES RELATED TO AROMATASE INHIBITOR THERAPY: ICD-10-CM

## 2024-05-28 DIAGNOSIS — Z79.811 AROMATASE INHIBITOR USE: Primary | ICD-10-CM

## 2024-05-28 DIAGNOSIS — Z30.431 IUD CHECK UP: Primary | ICD-10-CM

## 2024-05-28 DIAGNOSIS — T45.1X5A HOT FLASHES RELATED TO AROMATASE INHIBITOR THERAPY: ICD-10-CM

## 2024-05-28 PROCEDURE — 99214 OFFICE O/P EST MOD 30 MIN: CPT | Performed by: OBSTETRICS & GYNECOLOGY

## 2024-05-28 PROCEDURE — 76830 TRANSVAGINAL US NON-OB: CPT | Performed by: OBSTETRICS & GYNECOLOGY

## 2024-05-31 DIAGNOSIS — C50.411 MALIGNANT NEOPLASM OF UPPER-OUTER QUADRANT OF RIGHT BREAST IN FEMALE, ESTROGEN RECEPTOR POSITIVE (HCC): Primary | ICD-10-CM

## 2024-05-31 DIAGNOSIS — Z79.899 HIGH RISK MEDICATION USE: ICD-10-CM

## 2024-05-31 DIAGNOSIS — Z17.0 MALIGNANT NEOPLASM OF UPPER-OUTER QUADRANT OF RIGHT BREAST IN FEMALE, ESTROGEN RECEPTOR POSITIVE (HCC): Primary | ICD-10-CM

## 2024-05-31 NOTE — PROGRESS NOTES
to osteoporosis/fractures, joint stiffness, N/V, hair thinning, weight changes, thrombosis and worsening depression  to name a few. Printed info given to pt for her reference.  MOA reviewed.   - pain under right implant/over ribs - relatively stable; s/p CT/MRI/mammo/US and PET.  MRI with some enhancement adj to implant - could be post-surgical.  Reviewed in tumor board  and felt to be benign.  No worrisome findings on subsequent mammo/US and PET.  Now improvement noted on current MRI further suggesting benign finding.    - small (<1cm) nodule above left implant - felt over ribs when pt raises her arm - will be eval on MRI, ?LN - not noted on MRI--stable from previous and unchanged per patient.   - easy bruising - mostly over LE - will check nutritional labs : iron and B12; previous results showed B12 defic - s/p 4x B12 inj with sig improvement in counts, can continue with oral supplementation,  can also start vit C daily; checked PFA-100 - pending.     - Most recent MRI reviewed with the patient we will proceed with CT scan per her plastic surgeon from [facility] recommendation; CT chest 6/20/22 - negative for pulmonary disease.    menses - no menses for a few months.  Discussed use of IUD, reviewed safe vaginal lubricants  - previously referred to Dr Anita Jack per request; Cu++ IUD inserted   - here for follow-up and next Zoladex and continues on Aromasin.  She has been doing well overall.    - Vaginal dryness as well as painful intercourse.  She can try vaginal dilators and will also try a different lubricant and vaginal moisturizer.  Se has not tried any of these interventions yet.    - follow up on Tamoxifen.  Overall, she continues to tolerate Tamoxifen much better than AI.  Mild fatigue is ongoing.  There are no GI or bowel complaints.  Appetite is good and weight is stable.  There is no chest pain, shortness of breath, or edema.  Hot flashes have improved, now only once a week. Mild arthralgias remain, but much

## 2024-06-06 ENCOUNTER — HOSPITAL ENCOUNTER (OUTPATIENT)
Dept: INFUSION THERAPY | Age: 34
Setting detail: INFUSION SERIES
Discharge: HOME | End: 2024-06-06
Payer: COMMERCIAL

## 2024-06-06 ENCOUNTER — OFFICE VISIT (OUTPATIENT)
Dept: ONCOLOGY | Age: 34
End: 2024-06-06
Payer: COMMERCIAL

## 2024-06-06 ENCOUNTER — HOSPITAL ENCOUNTER (OUTPATIENT)
Dept: LAB | Age: 34
Discharge: HOME | End: 2024-06-06
Payer: COMMERCIAL

## 2024-06-06 VITALS
HEART RATE: 78 BPM | TEMPERATURE: 98.1 F | DIASTOLIC BLOOD PRESSURE: 62 MMHG | SYSTOLIC BLOOD PRESSURE: 126 MMHG | BODY MASS INDEX: 27.49 KG/M2 | OXYGEN SATURATION: 99 % | WEIGHT: 165 LBS | HEIGHT: 65 IN

## 2024-06-06 DIAGNOSIS — Z79.810 LONG-TERM CURRENT USE OF TAMOXIFEN: ICD-10-CM

## 2024-06-06 DIAGNOSIS — C50.919 MALIGNANT NEOPLASM OF FEMALE BREAST, UNSPECIFIED ESTROGEN RECEPTOR STATUS, UNSPECIFIED LATERALITY, UNSPECIFIED SITE OF BREAST (HCC): Primary | ICD-10-CM

## 2024-06-06 DIAGNOSIS — Z90.13 HX OF BILATERAL MASTECTOMY: ICD-10-CM

## 2024-06-06 DIAGNOSIS — Z79.899 HIGH RISK MEDICATION USE: ICD-10-CM

## 2024-06-06 DIAGNOSIS — C50.411 MALIGNANT NEOPLASM OF UPPER-OUTER QUADRANT OF RIGHT BREAST IN FEMALE, ESTROGEN RECEPTOR POSITIVE (HCC): ICD-10-CM

## 2024-06-06 DIAGNOSIS — Z17.0 MALIGNANT NEOPLASM OF UPPER-OUTER QUADRANT OF RIGHT BREAST IN FEMALE, ESTROGEN RECEPTOR POSITIVE (HCC): ICD-10-CM

## 2024-06-06 DIAGNOSIS — C77.3 SECONDARY AND UNSPECIFIED MALIGNANT NEOPLASM OF AXILLA AND UPPER LIMB LYMPH NODES (HCC): ICD-10-CM

## 2024-06-06 DIAGNOSIS — Z79.818 USE OF GOSERELIN ACETATE (ZOLADEX): ICD-10-CM

## 2024-06-06 LAB
ALBUMIN SERPL-MCNC: 4.3 G/DL (ref 3.5–5)
ALBUMIN/GLOB SERPL: 1.5 (ref 1–1.9)
ALP SERPL-CCNC: 50 U/L (ref 35–104)
ALT SERPL-CCNC: 29 U/L (ref 12–65)
ANION GAP SERPL CALC-SCNC: 10 MMOL/L (ref 9–18)
AST SERPL-CCNC: 25 U/L (ref 15–37)
BASOPHILS # BLD: 0.1 K/UL (ref 0–0.2)
BASOPHILS NFR BLD: 1 % (ref 0–2)
BILIRUB SERPL-MCNC: 0.3 MG/DL (ref 0–1.2)
BUN SERPL-MCNC: 11 MG/DL (ref 6–23)
CALCIUM SERPL-MCNC: 9.6 MG/DL (ref 8.8–10.2)
CHLORIDE SERPL-SCNC: 104 MMOL/L (ref 98–107)
CO2 SERPL-SCNC: 27 MMOL/L (ref 20–28)
CREAT SERPL-MCNC: 0.72 MG/DL (ref 0.6–1.1)
DIFFERENTIAL METHOD BLD: NORMAL
EOSINOPHIL # BLD: 0.4 K/UL (ref 0–0.8)
EOSINOPHIL NFR BLD: 6 % (ref 0.5–7.8)
ERYTHROCYTE [DISTWIDTH] IN BLOOD BY AUTOMATED COUNT: 12.2 % (ref 11.9–14.6)
GLOBULIN SER CALC-MCNC: 2.9 G/DL (ref 2.3–3.5)
GLUCOSE SERPL-MCNC: 105 MG/DL (ref 70–99)
HCG SERPL QL: NEGATIVE
HCT VFR BLD AUTO: 40.6 % (ref 35.8–46.3)
HGB BLD-MCNC: 13.7 G/DL (ref 11.7–15.4)
IMM GRANULOCYTES # BLD AUTO: 0 K/UL (ref 0–0.5)
IMM GRANULOCYTES NFR BLD AUTO: 0 % (ref 0–5)
LYMPHOCYTES # BLD: 1.5 K/UL (ref 0.5–4.6)
LYMPHOCYTES NFR BLD: 25 % (ref 13–44)
MCH RBC QN AUTO: 29.8 PG (ref 26.1–32.9)
MCHC RBC AUTO-ENTMCNC: 33.7 G/DL (ref 31.4–35)
MCV RBC AUTO: 88.5 FL (ref 82–102)
MONOCYTES # BLD: 0.4 K/UL (ref 0.1–1.3)
MONOCYTES NFR BLD: 6 % (ref 4–12)
NEUTS SEG # BLD: 3.8 K/UL (ref 1.7–8.2)
NEUTS SEG NFR BLD: 62 % (ref 43–78)
NRBC # BLD: 0 K/UL (ref 0–0.2)
PLATELET # BLD AUTO: 240 K/UL (ref 150–450)
PMV BLD AUTO: 11.1 FL (ref 9.4–12.3)
POTASSIUM SERPL-SCNC: 4.4 MMOL/L (ref 3.5–5.1)
PROT SERPL-MCNC: 7.2 G/DL (ref 6.3–8.2)
RBC # BLD AUTO: 4.59 M/UL (ref 4.05–5.2)
SODIUM SERPL-SCNC: 141 MMOL/L (ref 136–145)
WBC # BLD AUTO: 6.1 K/UL (ref 4.3–11.1)

## 2024-06-06 PROCEDURE — 80053 COMPREHEN METABOLIC PANEL: CPT

## 2024-06-06 PROCEDURE — 96402 CHEMO HORMON ANTINEOPL SQ/IM: CPT

## 2024-06-06 PROCEDURE — 6360000002 HC RX W HCPCS: Performed by: INTERNAL MEDICINE

## 2024-06-06 PROCEDURE — 36415 COLL VENOUS BLD VENIPUNCTURE: CPT

## 2024-06-06 PROCEDURE — 99215 OFFICE O/P EST HI 40 MIN: CPT | Performed by: INTERNAL MEDICINE

## 2024-06-06 PROCEDURE — 84703 CHORIONIC GONADOTROPIN ASSAY: CPT

## 2024-06-06 PROCEDURE — 85025 COMPLETE CBC W/AUTO DIFF WBC: CPT

## 2024-06-06 RX ORDER — SODIUM CHLORIDE 9 MG/ML
INJECTION, SOLUTION INTRAVENOUS CONTINUOUS
Status: CANCELLED | OUTPATIENT
Start: 2024-06-06

## 2024-06-06 RX ORDER — ALBUTEROL SULFATE 90 UG/1
4 AEROSOL, METERED RESPIRATORY (INHALATION) PRN
OUTPATIENT
Start: 2024-07-04

## 2024-06-06 RX ORDER — ACETAMINOPHEN 325 MG/1
650 TABLET ORAL
Status: CANCELLED | OUTPATIENT
Start: 2024-06-06

## 2024-06-06 RX ORDER — EPINEPHRINE 1 MG/ML
0.3 INJECTION, SOLUTION, CONCENTRATE INTRAVENOUS PRN
OUTPATIENT
Start: 2024-07-04

## 2024-06-06 RX ORDER — SODIUM CHLORIDE 9 MG/ML
INJECTION, SOLUTION INTRAVENOUS CONTINUOUS
OUTPATIENT
Start: 2024-07-04

## 2024-06-06 RX ORDER — DIPHENHYDRAMINE HYDROCHLORIDE 50 MG/ML
50 INJECTION INTRAMUSCULAR; INTRAVENOUS
OUTPATIENT
Start: 2024-07-04

## 2024-06-06 RX ORDER — ONDANSETRON 2 MG/ML
8 INJECTION INTRAMUSCULAR; INTRAVENOUS
OUTPATIENT
Start: 2024-07-04

## 2024-06-06 RX ORDER — ALBUTEROL SULFATE 90 UG/1
4 AEROSOL, METERED RESPIRATORY (INHALATION) PRN
Status: CANCELLED | OUTPATIENT
Start: 2024-06-06

## 2024-06-06 RX ORDER — ACETAMINOPHEN 325 MG/1
650 TABLET ORAL
OUTPATIENT
Start: 2024-07-04

## 2024-06-06 RX ORDER — EPINEPHRINE 1 MG/ML
0.3 INJECTION, SOLUTION, CONCENTRATE INTRAVENOUS PRN
Status: CANCELLED | OUTPATIENT
Start: 2024-06-06

## 2024-06-06 RX ORDER — ONDANSETRON 2 MG/ML
8 INJECTION INTRAMUSCULAR; INTRAVENOUS
Status: CANCELLED | OUTPATIENT
Start: 2024-06-06

## 2024-06-06 RX ORDER — DIPHENHYDRAMINE HYDROCHLORIDE 50 MG/ML
50 INJECTION INTRAMUSCULAR; INTRAVENOUS
Status: CANCELLED | OUTPATIENT
Start: 2024-06-06

## 2024-06-06 RX ADMIN — GOSERELIN ACETATE 3.6 MG: 3.6 IMPLANT SUBCUTANEOUS at 11:52

## 2024-06-06 ASSESSMENT — PATIENT HEALTH QUESTIONNAIRE - PHQ9
SUM OF ALL RESPONSES TO PHQ QUESTIONS 1-9: 0
2. FEELING DOWN, DEPRESSED OR HOPELESS: NOT AT ALL
SUM OF ALL RESPONSES TO PHQ QUESTIONS 1-9: 0
1. LITTLE INTEREST OR PLEASURE IN DOING THINGS: NOT AT ALL
SUM OF ALL RESPONSES TO PHQ9 QUESTIONS 1 & 2: 0
SUM OF ALL RESPONSES TO PHQ QUESTIONS 1-9: 0
SUM OF ALL RESPONSES TO PHQ QUESTIONS 1-9: 0

## 2024-06-06 NOTE — PATIENT INSTRUCTIONS
Patient Information from Today's Visit    The members of your [facility] are listed below:    Physician Provider: Lucrecia Mijares, Medical Oncologist  Advanced Practice Clinician: Jayla Jiang NP  Registered Nurse: Kerri KASPER RN  Navigator: Emilie UREDA RN  Medical Assistant: Donna MAYA MA  : Carmela VIERA   Supportive Care Services: Estrellita LEYVA LMSW    Diagnosis: Breast    Follow Up Instructions: Monthly.    Labs reviewed.  Symptoms reviewed.  Discussed BCI and Signatera results.    Treatment Summary has been discussed and given to patient:N/A      Current Labs:   Hospital Outpatient Visit on 06/06/2024   Component Date Value Ref Range Status    Preg, Serum 06/06/2024 Negative  NEG   Final    Sodium 06/06/2024 141  136 - 145 mmol/L Final    Potassium 06/06/2024 4.4  3.5 - 5.1 mmol/L Final    Chloride 06/06/2024 104  98 - 107 mmol/L Final    CO2 06/06/2024 27  20 - 28 mmol/L Final    Anion Gap 06/06/2024 10  9 - 18 mmol/L Final    Glucose 06/06/2024 105 (H)  70 - 99 mg/dL Final    Comment: <70 mg/dL Consistent with, but not fully diagnostic of hypoglycemia.  100 - 125 mg/dL Impaired fasting glucose/consistent with pre-diabetes mellitus.  > 126 mg/dl Fasting glucose consistent with overt diabetes mellitus      BUN 06/06/2024 11  6 - 23 MG/DL Final    Creatinine 06/06/2024 0.72  0.60 - 1.10 MG/DL Final    Est, Glom Filt Rate 06/06/2024 >90  >60 ml/min/1.73m2 Final    Comment:    Pediatric calculator link: https://www.kidney.org/professionals/kdoqi/gfr_calculatorped     These results are not intended for use in patients <18 years of age.     eGFR results are calculated without a race factor using  the 2021 CKD-EPI equation. Careful clinical correlation is recommended, particularly when comparing to results calculated using previous equations.  The CKD-EPI equation is less accurate in patients with extremes of muscle mass, extra-renal metabolism of creatinine, excessive creatine ingestion, or

## 2024-06-06 NOTE — PROGRESS NOTES
Arrived to the Infusion Center.  Zoladex completed.   Provided education on side effects.    Patient instructed to report any side affects to ordering provider.  Patient tolerated without complication.   Any issues or concerns during appointment: No.  Patient aware of next follow up  appointment on 08/3024 (date) at 1400 (time).  Discharged ambulatory.

## 2024-06-13 PROBLEM — Z79.810 LONG-TERM CURRENT USE OF TAMOXIFEN: Status: ACTIVE | Noted: 2024-06-13

## 2024-06-13 PROBLEM — R23.2 HOT FLASHES RELATED TO AROMATASE INHIBITOR THERAPY: Status: RESOLVED | Noted: 2020-08-13 | Resolved: 2024-06-13

## 2024-06-13 PROBLEM — T45.1X5A HOT FLASHES RELATED TO AROMATASE INHIBITOR THERAPY: Status: RESOLVED | Noted: 2020-08-13 | Resolved: 2024-06-13

## 2024-06-13 PROBLEM — Z79.818 USE OF GOSERELIN ACETATE (ZOLADEX): Status: ACTIVE | Noted: 2024-06-13

## 2024-06-13 PROBLEM — Z79.811 AROMATASE INHIBITOR USE: Status: RESOLVED | Noted: 2019-12-10 | Resolved: 2024-06-13

## 2024-07-05 ENCOUNTER — HOSPITAL ENCOUNTER (OUTPATIENT)
Dept: LAB | Age: 34
Discharge: HOME | End: 2024-07-08

## 2024-07-05 ENCOUNTER — HOSPITAL ENCOUNTER (OUTPATIENT)
Dept: INFUSION THERAPY | Age: 34
Setting detail: INFUSION SERIES
Discharge: HOME | End: 2024-07-05
Payer: COMMERCIAL

## 2024-07-05 VITALS
DIASTOLIC BLOOD PRESSURE: 71 MMHG | HEART RATE: 67 BPM | OXYGEN SATURATION: 94 % | RESPIRATION RATE: 16 BRPM | TEMPERATURE: 98.3 F | SYSTOLIC BLOOD PRESSURE: 104 MMHG

## 2024-07-05 DIAGNOSIS — C50.919 MALIGNANT NEOPLASM OF FEMALE BREAST, UNSPECIFIED ESTROGEN RECEPTOR STATUS, UNSPECIFIED LATERALITY, UNSPECIFIED SITE OF BREAST (HCC): Primary | ICD-10-CM

## 2024-07-05 LAB — HCG UR QL: NEGATIVE

## 2024-07-05 PROCEDURE — 6360000002 HC RX W HCPCS: Performed by: INTERNAL MEDICINE

## 2024-07-05 PROCEDURE — 81025 URINE PREGNANCY TEST: CPT

## 2024-07-05 PROCEDURE — 96402 CHEMO HORMON ANTINEOPL SQ/IM: CPT

## 2024-07-05 RX ORDER — ACETAMINOPHEN 325 MG/1
650 TABLET ORAL
OUTPATIENT
Start: 2024-08-02

## 2024-07-05 RX ORDER — SODIUM CHLORIDE 9 MG/ML
INJECTION, SOLUTION INTRAVENOUS CONTINUOUS
OUTPATIENT
Start: 2024-08-02

## 2024-07-05 RX ORDER — ONDANSETRON 2 MG/ML
8 INJECTION INTRAMUSCULAR; INTRAVENOUS
OUTPATIENT
Start: 2024-08-02

## 2024-07-05 RX ORDER — DIPHENHYDRAMINE HYDROCHLORIDE 50 MG/ML
50 INJECTION INTRAMUSCULAR; INTRAVENOUS
OUTPATIENT
Start: 2024-08-02

## 2024-07-05 RX ORDER — EPINEPHRINE 1 MG/ML
0.3 INJECTION, SOLUTION, CONCENTRATE INTRAVENOUS PRN
OUTPATIENT
Start: 2024-08-02

## 2024-07-05 RX ORDER — ALBUTEROL SULFATE 90 UG/1
4 AEROSOL, METERED RESPIRATORY (INHALATION) PRN
OUTPATIENT
Start: 2024-08-02

## 2024-07-05 RX ADMIN — GOSERELIN ACETATE 3.6 MG: 3.6 IMPLANT SUBCUTANEOUS at 08:41

## 2024-07-05 NOTE — PROGRESS NOTES
Arrived to the Infusion Center.  Zoladex completed.   Provided education on Zoladex    Patient instructed to report any side affects to ordering provider.  Patient tolerated well.   Any issues or concerns during appointment: none.  Patient aware of next infusion appointment on 8/2/24 (date) at 0800 (time).  Discharged amb.

## 2024-07-26 NOTE — PROCEDURE: SUNSCREEN TREATMENT REGIMEN
Name and  verified. Pt reported her symptoms started Monday. Pt given F/U for  at 8:40 am. If she is still ill; she will cancel the appointment the night before. Pt advised to wear mask to appointment. Pt agreeable with plan. Did not cancel August appointment at this time just in case pt is ill.       Future Appointments   Date Time Provider Department Center   2024 11:30 AM Dalton Mendoza APRN [facility] [place]   2024  2:00 PM Soraya Pope LCSW [facility] [place]   2024  8:40 AM Caity Ellison MD [facility]   2024 11:00 AM Soraya Pope LCSW [facility] [place]   2024 12:00 PM Caity Ellison MD [facility]   2024  2:15 PM VALENTÍN KEE [facility] None        Detail Level: Generalized

## 2024-08-02 ENCOUNTER — APPOINTMENT (OUTPATIENT)
Dept: INFUSION THERAPY | Age: 34
End: 2024-08-02
Payer: COMMERCIAL

## 2024-08-09 ENCOUNTER — HOSPITAL ENCOUNTER (OUTPATIENT)
Dept: INFUSION THERAPY | Age: 34
Setting detail: INFUSION SERIES
Discharge: HOME | End: 2024-08-09
Payer: COMMERCIAL

## 2024-08-09 ENCOUNTER — HOSPITAL ENCOUNTER (OUTPATIENT)
Dept: LAB | Age: 34
Discharge: HOME | End: 2024-08-12

## 2024-08-09 VITALS
TEMPERATURE: 98.3 F | RESPIRATION RATE: 16 BRPM | HEART RATE: 68 BPM | DIASTOLIC BLOOD PRESSURE: 83 MMHG | SYSTOLIC BLOOD PRESSURE: 139 MMHG | OXYGEN SATURATION: 97 % | WEIGHT: 165.2 LBS | BODY MASS INDEX: 27.49 KG/M2

## 2024-08-09 DIAGNOSIS — C50.919 MALIGNANT NEOPLASM OF FEMALE BREAST, UNSPECIFIED ESTROGEN RECEPTOR STATUS, UNSPECIFIED LATERALITY, UNSPECIFIED SITE OF BREAST (HCC): Primary | ICD-10-CM

## 2024-08-09 LAB — HCG UR QL: NEGATIVE

## 2024-08-09 PROCEDURE — 96402 CHEMO HORMON ANTINEOPL SQ/IM: CPT

## 2024-08-09 PROCEDURE — 6360000002 HC RX W HCPCS: Performed by: INTERNAL MEDICINE

## 2024-08-09 PROCEDURE — 81025 URINE PREGNANCY TEST: CPT

## 2024-08-09 RX ORDER — SODIUM CHLORIDE 9 MG/ML
INJECTION, SOLUTION INTRAVENOUS CONTINUOUS
OUTPATIENT
Start: 2024-08-30

## 2024-08-09 RX ORDER — DIPHENHYDRAMINE HYDROCHLORIDE 50 MG/ML
50 INJECTION INTRAMUSCULAR; INTRAVENOUS
OUTPATIENT
Start: 2024-08-30

## 2024-08-09 RX ORDER — ALBUTEROL SULFATE 90 UG/1
4 AEROSOL, METERED RESPIRATORY (INHALATION) PRN
OUTPATIENT
Start: 2024-08-30

## 2024-08-09 RX ORDER — ACETAMINOPHEN 325 MG/1
650 TABLET ORAL
OUTPATIENT
Start: 2024-08-30

## 2024-08-09 RX ORDER — EPINEPHRINE 1 MG/ML
0.3 INJECTION, SOLUTION, CONCENTRATE INTRAVENOUS PRN
OUTPATIENT
Start: 2024-08-30

## 2024-08-09 RX ORDER — ONDANSETRON 2 MG/ML
8 INJECTION INTRAMUSCULAR; INTRAVENOUS
OUTPATIENT
Start: 2024-08-30

## 2024-08-09 RX ADMIN — GOSERELIN ACETATE 3.6 MG: 3.6 IMPLANT SUBCUTANEOUS at 08:40

## 2024-08-09 NOTE — PROGRESS NOTES
Arrived to the Infusion Center.  Zoladex completed.   Patient instructed to report any side effects to ordering provider.  Patient tolerated well.   Patient aware of next infusion appointment on 9/6/2024 (date) at 1245 (time).  Discharged ambulatory.

## 2024-08-12 SDOH — ECONOMIC STABILITY: FOOD INSECURITY: WITHIN THE PAST 12 MONTHS, THE FOOD YOU BOUGHT JUST DIDN'T LAST AND YOU DIDN'T HAVE MONEY TO GET MORE.: NEVER TRUE

## 2024-08-12 SDOH — ECONOMIC STABILITY: FOOD INSECURITY: WITHIN THE PAST 12 MONTHS, YOU WORRIED THAT YOUR FOOD WOULD RUN OUT BEFORE YOU GOT MONEY TO BUY MORE.: NEVER TRUE

## 2024-08-13 ENCOUNTER — OFFICE VISIT (OUTPATIENT)
Dept: PRIMARY CARE CLINIC | Facility: CLINIC | Age: 34
End: 2024-08-13
Payer: COMMERCIAL

## 2024-08-13 VITALS
DIASTOLIC BLOOD PRESSURE: 84 MMHG | HEART RATE: 63 BPM | OXYGEN SATURATION: 97 % | TEMPERATURE: 98.4 F | BODY MASS INDEX: 27.29 KG/M2 | SYSTOLIC BLOOD PRESSURE: 120 MMHG | WEIGHT: 164 LBS

## 2024-08-13 DIAGNOSIS — R73.9 HYPERGLYCEMIA: ICD-10-CM

## 2024-08-13 DIAGNOSIS — Z79.810 LONG-TERM CURRENT USE OF TAMOXIFEN: ICD-10-CM

## 2024-08-13 DIAGNOSIS — F41.9 ANXIETY: Primary | ICD-10-CM

## 2024-08-13 DIAGNOSIS — E53.8 LOW SERUM VITAMIN B12: ICD-10-CM

## 2024-08-13 DIAGNOSIS — E55.9 VITAMIN D DEFICIENCY: ICD-10-CM

## 2024-08-13 DIAGNOSIS — E78.00 ELEVATED LDL CHOLESTEROL LEVEL: ICD-10-CM

## 2024-08-13 LAB
25(OH)D3 SERPL-MCNC: 38.6 NG/ML (ref 30–100)
CHOLEST SERPL-MCNC: 194 MG/DL (ref 0–200)
EST. AVERAGE GLUCOSE BLD GHB EST-MCNC: 105 MG/DL
HBA1C MFR BLD: 5.3 % (ref 0–5.6)
HDLC SERPL-MCNC: 62 MG/DL (ref 40–60)
HDLC SERPL: 3.1 (ref 0–5)
LDLC SERPL CALC-MCNC: 116 MG/DL (ref 0–100)
TRIGL SERPL-MCNC: 79 MG/DL (ref 0–150)
VIT B12 SERPL-MCNC: 561 PG/ML (ref 193–986)
VLDLC SERPL CALC-MCNC: 16 MG/DL (ref 6–23)

## 2024-08-13 PROCEDURE — 99214 OFFICE O/P EST MOD 30 MIN: CPT | Performed by: FAMILY MEDICINE

## 2024-08-13 RX ORDER — LORAZEPAM 1 MG/1
1 TABLET ORAL EVERY 8 HOURS PRN
Qty: 30 TABLET | Refills: 2 | Status: SHIPPED | OUTPATIENT
Start: 2024-08-13 | End: 2024-09-12

## 2024-08-13 ASSESSMENT — PATIENT HEALTH QUESTIONNAIRE - PHQ9
SUM OF ALL RESPONSES TO PHQ QUESTIONS 1-9: 0
1. LITTLE INTEREST OR PLEASURE IN DOING THINGS: NOT AT ALL
SUM OF ALL RESPONSES TO PHQ9 QUESTIONS 1 & 2: 0
SUM OF ALL RESPONSES TO PHQ QUESTIONS 1-9: 0
2. FEELING DOWN, DEPRESSED OR HOPELESS: NOT AT ALL
SUM OF ALL RESPONSES TO PHQ QUESTIONS 1-9: 0
SUM OF ALL RESPONSES TO PHQ QUESTIONS 1-9: 0

## 2024-08-13 ASSESSMENT — ENCOUNTER SYMPTOMS
DIARRHEA: 0
COUGH: 0
SHORTNESS OF BREATH: 0
ABDOMINAL PAIN: 0
VOMITING: 0

## 2024-08-13 NOTE — ASSESSMENT & PLAN NOTE
Chronic, stable on Lorazepam.  Discussed using this at night to help with sleep as needed.  Refill sent.

## 2024-08-13 NOTE — ASSESSMENT & PLAN NOTE
Recent testing suggests patient may not need to continue this for ten years, has been on it for five.  Will continue to monitor.

## 2024-08-13 NOTE — PATIENT INSTRUCTIONS
IT WAS GREAT TO SEE YOU TODAY!    I WILL CONTACT YOU WITH THE RESULTS OF YOUR LABS.    PLEASE TAKE ALL MEDICATION AS DISCUSSED.    ~TRY TAKING HALF OF A LORAZEPAM AT NIGHT ONCE OR TWICE A WEEK TO HELP WITH SLEEP.    I WILL SEE YOU AGAIN IN 6 MONTHS BUT PLEASE CALL WITH CONCERNS [phone]

## 2024-08-30 ENCOUNTER — APPOINTMENT (OUTPATIENT)
Dept: INFUSION THERAPY | Age: 34
End: 2024-08-30
Payer: COMMERCIAL

## 2024-08-30 DIAGNOSIS — C50.919 MALIGNANT NEOPLASM OF FEMALE BREAST, UNSPECIFIED ESTROGEN RECEPTOR STATUS, UNSPECIFIED LATERALITY, UNSPECIFIED SITE OF BREAST (HCC): Primary | ICD-10-CM

## 2024-09-06 ENCOUNTER — HOSPITAL ENCOUNTER (OUTPATIENT)
Dept: LAB | Age: 34
Discharge: HOME | End: 2024-09-09
Payer: COMMERCIAL

## 2024-09-06 ENCOUNTER — OFFICE VISIT (OUTPATIENT)
Dept: ONCOLOGY | Age: 34
End: 2024-09-06
Payer: COMMERCIAL

## 2024-09-06 ENCOUNTER — HOSPITAL ENCOUNTER (OUTPATIENT)
Dept: INFUSION THERAPY | Age: 34
Setting detail: INFUSION SERIES
Discharge: HOME | End: 2024-09-06
Payer: COMMERCIAL

## 2024-09-06 VITALS
TEMPERATURE: 98.5 F | SYSTOLIC BLOOD PRESSURE: 121 MMHG | HEIGHT: 65 IN | WEIGHT: 166 LBS | OXYGEN SATURATION: 98 % | HEART RATE: 72 BPM | DIASTOLIC BLOOD PRESSURE: 74 MMHG | RESPIRATION RATE: 17 BRPM | BODY MASS INDEX: 27.66 KG/M2

## 2024-09-06 DIAGNOSIS — C50.919 MALIGNANT NEOPLASM OF FEMALE BREAST, UNSPECIFIED ESTROGEN RECEPTOR STATUS, UNSPECIFIED LATERALITY, UNSPECIFIED SITE OF BREAST (HCC): Primary | ICD-10-CM

## 2024-09-06 DIAGNOSIS — Z79.810 LONG-TERM CURRENT USE OF TAMOXIFEN: ICD-10-CM

## 2024-09-06 DIAGNOSIS — Z91.89 AT RISK FOR OSTEOPOROSIS: ICD-10-CM

## 2024-09-06 DIAGNOSIS — C77.3 SECONDARY AND UNSPECIFIED MALIGNANT NEOPLASM OF AXILLA AND UPPER LIMB LYMPH NODES (HCC): ICD-10-CM

## 2024-09-06 DIAGNOSIS — Z90.13 HX OF BILATERAL MASTECTOMY: ICD-10-CM

## 2024-09-06 DIAGNOSIS — C50.919 MALIGNANT NEOPLASM OF FEMALE BREAST, UNSPECIFIED ESTROGEN RECEPTOR STATUS, UNSPECIFIED LATERALITY, UNSPECIFIED SITE OF BREAST (HCC): ICD-10-CM

## 2024-09-06 DIAGNOSIS — Z79.818 USE OF GOSERELIN ACETATE (ZOLADEX): ICD-10-CM

## 2024-09-06 LAB
ALBUMIN SERPL-MCNC: 4.3 G/DL (ref 3.5–5)
ALBUMIN/GLOB SERPL: 1.5 (ref 1–1.9)
ALP SERPL-CCNC: 54 U/L (ref 35–104)
ALT SERPL-CCNC: 50 U/L (ref 12–65)
ANION GAP SERPL CALC-SCNC: 11 MMOL/L (ref 9–18)
AST SERPL-CCNC: 33 U/L (ref 15–37)
BASOPHILS # BLD: 0.1 K/UL (ref 0–0.2)
BASOPHILS NFR BLD: 1 % (ref 0–2)
BILIRUB SERPL-MCNC: 0.3 MG/DL (ref 0–1.2)
BUN SERPL-MCNC: 16 MG/DL (ref 6–23)
CALCIUM SERPL-MCNC: 10 MG/DL (ref 8.8–10.2)
CHLORIDE SERPL-SCNC: 105 MMOL/L (ref 98–107)
CO2 SERPL-SCNC: 27 MMOL/L (ref 20–28)
CREAT SERPL-MCNC: 0.74 MG/DL (ref 0.6–1.1)
DIFFERENTIAL METHOD BLD: NORMAL
EOSINOPHIL # BLD: 0.3 K/UL (ref 0–0.8)
EOSINOPHIL NFR BLD: 6 % (ref 0.5–7.8)
ERYTHROCYTE [DISTWIDTH] IN BLOOD BY AUTOMATED COUNT: 12 % (ref 11.9–14.6)
GLOBULIN SER CALC-MCNC: 2.9 G/DL (ref 2.3–3.5)
GLUCOSE SERPL-MCNC: 102 MG/DL (ref 70–99)
HCG UR QL: NEGATIVE
HCT VFR BLD AUTO: 41.7 % (ref 35.8–46.3)
HGB BLD-MCNC: 13.9 G/DL (ref 11.7–15.4)
IMM GRANULOCYTES # BLD AUTO: 0 K/UL (ref 0–0.5)
IMM GRANULOCYTES NFR BLD AUTO: 0 % (ref 0–5)
LYMPHOCYTES # BLD: 1.7 K/UL (ref 0.5–4.6)
LYMPHOCYTES NFR BLD: 30 % (ref 13–44)
MCH RBC QN AUTO: 29.9 PG (ref 26.1–32.9)
MCHC RBC AUTO-ENTMCNC: 33.3 G/DL (ref 31.4–35)
MCV RBC AUTO: 89.7 FL (ref 82–102)
MONOCYTES # BLD: 0.3 K/UL (ref 0.1–1.3)
MONOCYTES NFR BLD: 6 % (ref 4–12)
NEUTS SEG # BLD: 3.4 K/UL (ref 1.7–8.2)
NEUTS SEG NFR BLD: 57 % (ref 43–78)
NRBC # BLD: 0 K/UL (ref 0–0.2)
PLATELET # BLD AUTO: 229 K/UL (ref 150–450)
PMV BLD AUTO: 10.9 FL (ref 9.4–12.3)
POTASSIUM SERPL-SCNC: 4.3 MMOL/L (ref 3.5–5.1)
PROT SERPL-MCNC: 7.2 G/DL (ref 6.3–8.2)
RBC # BLD AUTO: 4.65 M/UL (ref 4.05–5.2)
SODIUM SERPL-SCNC: 143 MMOL/L (ref 136–145)
WBC # BLD AUTO: 5.8 K/UL (ref 4.3–11.1)

## 2024-09-06 PROCEDURE — 36415 COLL VENOUS BLD VENIPUNCTURE: CPT

## 2024-09-06 PROCEDURE — 80053 COMPREHEN METABOLIC PANEL: CPT

## 2024-09-06 PROCEDURE — 85025 COMPLETE CBC W/AUTO DIFF WBC: CPT

## 2024-09-06 PROCEDURE — 99213 OFFICE O/P EST LOW 20 MIN: CPT

## 2024-09-06 PROCEDURE — 6360000002 HC RX W HCPCS: Performed by: INTERNAL MEDICINE

## 2024-09-06 PROCEDURE — 81025 URINE PREGNANCY TEST: CPT

## 2024-09-06 PROCEDURE — 96402 CHEMO HORMON ANTINEOPL SQ/IM: CPT

## 2024-09-06 RX ORDER — SODIUM CHLORIDE 9 MG/ML
INJECTION, SOLUTION INTRAVENOUS CONTINUOUS
OUTPATIENT
Start: 2024-10-04

## 2024-09-06 RX ORDER — ONDANSETRON 2 MG/ML
8 INJECTION INTRAMUSCULAR; INTRAVENOUS
OUTPATIENT
Start: 2024-10-04

## 2024-09-06 RX ORDER — EPINEPHRINE 1 MG/ML
0.3 INJECTION, SOLUTION, CONCENTRATE INTRAVENOUS PRN
OUTPATIENT
Start: 2024-10-04

## 2024-09-06 RX ORDER — ACETAMINOPHEN 325 MG/1
650 TABLET ORAL
OUTPATIENT
Start: 2024-10-04

## 2024-09-06 RX ORDER — DIPHENHYDRAMINE HYDROCHLORIDE 50 MG/ML
50 INJECTION INTRAMUSCULAR; INTRAVENOUS
OUTPATIENT
Start: 2024-10-04

## 2024-09-06 RX ORDER — ALBUTEROL SULFATE 90 UG/1
4 AEROSOL, METERED RESPIRATORY (INHALATION) PRN
OUTPATIENT
Start: 2024-10-04

## 2024-09-06 RX ORDER — TAMOXIFEN CITRATE 20 MG/1
20 TABLET ORAL DAILY
Qty: 90 TABLET | Refills: 3 | Status: SHIPPED | OUTPATIENT
Start: 2024-09-06

## 2024-09-06 RX ADMIN — GOSERELIN ACETATE 3.6 MG: 3.6 IMPLANT SUBCUTANEOUS at 11:19

## 2024-09-06 ASSESSMENT — PATIENT HEALTH QUESTIONNAIRE - PHQ9
SUM OF ALL RESPONSES TO PHQ QUESTIONS 1-9: 0
SUM OF ALL RESPONSES TO PHQ QUESTIONS 1-9: 0
2. FEELING DOWN, DEPRESSED OR HOPELESS: NOT AT ALL
SUM OF ALL RESPONSES TO PHQ9 QUESTIONS 1 & 2: 0
SUM OF ALL RESPONSES TO PHQ QUESTIONS 1-9: 0
1. LITTLE INTEREST OR PLEASURE IN DOING THINGS: NOT AT ALL
SUM OF ALL RESPONSES TO PHQ QUESTIONS 1-9: 0

## 2024-09-06 NOTE — PROGRESS NOTES
Arrived to the Infusion Center.  Zoladex injection completed.   Patient instructed to report any side affects to ordering provider.  Patient tolerated well.   Any issues or concerns during appointment: none.  Patient aware of next infusion appointment on 10/04/024 (date) at 0900 (time).  Discharged ambulatory.

## 2024-09-10 ENCOUNTER — CLINICAL DOCUMENTATION (OUTPATIENT)
Dept: ONCOLOGY | Age: 34
End: 2024-09-10

## 2024-09-10 ENCOUNTER — CLINICAL DOCUMENTATION (OUTPATIENT)
Facility: HOSPITAL | Age: 34
End: 2024-09-10

## 2024-09-10 RX ORDER — TAMOXIFEN CITRATE 20 MG/1
20 TABLET ORAL DAILY
Qty: 90 TABLET | Refills: 3 | OUTPATIENT
Start: 2024-09-10

## 2024-09-27 ENCOUNTER — APPOINTMENT (OUTPATIENT)
Dept: INFUSION THERAPY | Age: 34
End: 2024-09-27
Payer: COMMERCIAL

## 2024-10-04 ENCOUNTER — HOSPITAL ENCOUNTER (OUTPATIENT)
Dept: LAB | Age: 34
Discharge: HOME | End: 2024-10-04

## 2024-10-04 ENCOUNTER — HOSPITAL ENCOUNTER (OUTPATIENT)
Dept: INFUSION THERAPY | Age: 34
Setting detail: INFUSION SERIES
Discharge: HOME | End: 2024-10-04
Payer: COMMERCIAL

## 2024-10-04 VITALS
OXYGEN SATURATION: 96 % | DIASTOLIC BLOOD PRESSURE: 66 MMHG | HEART RATE: 68 BPM | TEMPERATURE: 98.6 F | RESPIRATION RATE: 16 BRPM | SYSTOLIC BLOOD PRESSURE: 119 MMHG

## 2024-10-04 DIAGNOSIS — C50.919 MALIGNANT NEOPLASM OF FEMALE BREAST, UNSPECIFIED ESTROGEN RECEPTOR STATUS, UNSPECIFIED LATERALITY, UNSPECIFIED SITE OF BREAST (HCC): Primary | ICD-10-CM

## 2024-10-04 LAB — HCG UR QL: NEGATIVE

## 2024-10-04 PROCEDURE — 6360000002 HC RX W HCPCS: Performed by: INTERNAL MEDICINE

## 2024-10-04 PROCEDURE — 96402 CHEMO HORMON ANTINEOPL SQ/IM: CPT

## 2024-10-04 PROCEDURE — 81025 URINE PREGNANCY TEST: CPT

## 2024-10-04 RX ORDER — ACETAMINOPHEN 325 MG/1
650 TABLET ORAL
OUTPATIENT
Start: 2024-11-01

## 2024-10-04 RX ORDER — ALBUTEROL SULFATE 90 UG/1
4 INHALANT RESPIRATORY (INHALATION) PRN
OUTPATIENT
Start: 2024-11-01

## 2024-10-04 RX ORDER — EPINEPHRINE 1 MG/ML
0.3 INJECTION, SOLUTION, CONCENTRATE INTRAVENOUS PRN
OUTPATIENT
Start: 2024-11-01

## 2024-10-04 RX ORDER — DIPHENHYDRAMINE HYDROCHLORIDE 50 MG/ML
50 INJECTION INTRAMUSCULAR; INTRAVENOUS
OUTPATIENT
Start: 2024-11-01

## 2024-10-04 RX ORDER — SODIUM CHLORIDE 9 MG/ML
INJECTION, SOLUTION INTRAVENOUS CONTINUOUS
OUTPATIENT
Start: 2024-11-01

## 2024-10-04 RX ORDER — ONDANSETRON 2 MG/ML
8 INJECTION INTRAMUSCULAR; INTRAVENOUS
OUTPATIENT
Start: 2024-11-01

## 2024-10-04 RX ADMIN — GOSERELIN ACETATE 3.6 MG: 3.6 IMPLANT SUBCUTANEOUS at 08:56

## 2024-10-04 NOTE — PROGRESS NOTES
Arrived to the Infusion Center ambulatory.  Zoladex injection completed.   Patient has received this medication before and has no questions.  Patient instructed to report any side affects to ordering provider.  Patient tolerated well.   Any issues or concerns during appointment: none.  Patient aware of next infusion appointment on 11/1/2024 (date) at 0900 (time).  Discharged home ambulatory.

## 2024-11-01 ENCOUNTER — HOSPITAL ENCOUNTER (OUTPATIENT)
Dept: LAB | Age: 34
Discharge: HOME | End: 2024-11-01
Payer: COMMERCIAL

## 2024-11-01 ENCOUNTER — HOSPITAL ENCOUNTER (OUTPATIENT)
Dept: INFUSION THERAPY | Age: 34
Setting detail: INFUSION SERIES
Discharge: HOME | End: 2024-11-01
Payer: COMMERCIAL

## 2024-11-01 VITALS
OXYGEN SATURATION: 100 % | SYSTOLIC BLOOD PRESSURE: 133 MMHG | TEMPERATURE: 98.3 F | RESPIRATION RATE: 18 BRPM | DIASTOLIC BLOOD PRESSURE: 85 MMHG | HEART RATE: 69 BPM

## 2024-11-01 DIAGNOSIS — C50.919 MALIGNANT NEOPLASM OF FEMALE BREAST, UNSPECIFIED ESTROGEN RECEPTOR STATUS, UNSPECIFIED LATERALITY, UNSPECIFIED SITE OF BREAST (HCC): Primary | ICD-10-CM

## 2024-11-01 LAB — HCG UR QL: NEGATIVE

## 2024-11-01 PROCEDURE — 6360000002 HC RX W HCPCS: Performed by: INTERNAL MEDICINE

## 2024-11-01 PROCEDURE — 96413 CHEMO IV INFUSION 1 HR: CPT

## 2024-11-01 PROCEDURE — 81025 URINE PREGNANCY TEST: CPT

## 2024-11-01 RX ORDER — SODIUM CHLORIDE 9 MG/ML
INJECTION, SOLUTION INTRAVENOUS CONTINUOUS
OUTPATIENT
Start: 2024-11-29

## 2024-11-01 RX ORDER — ALBUTEROL SULFATE 90 UG/1
4 INHALANT RESPIRATORY (INHALATION) PRN
OUTPATIENT
Start: 2024-11-29

## 2024-11-01 RX ORDER — ONDANSETRON 2 MG/ML
8 INJECTION INTRAMUSCULAR; INTRAVENOUS
OUTPATIENT
Start: 2024-11-29

## 2024-11-01 RX ORDER — DIPHENHYDRAMINE HYDROCHLORIDE 50 MG/ML
50 INJECTION INTRAMUSCULAR; INTRAVENOUS
OUTPATIENT
Start: 2024-11-29

## 2024-11-01 RX ORDER — ACETAMINOPHEN 325 MG/1
650 TABLET ORAL
OUTPATIENT
Start: 2024-11-29

## 2024-11-01 RX ORDER — EPINEPHRINE 1 MG/ML
0.3 INJECTION, SOLUTION, CONCENTRATE INTRAVENOUS PRN
OUTPATIENT
Start: 2024-11-29

## 2024-11-01 RX ADMIN — GOSERELIN ACETATE 3.6 MG: 3.6 IMPLANT SUBCUTANEOUS at 09:36

## 2024-11-01 NOTE — PROGRESS NOTES
Arrived to the Infusion Center.  Zoladex completed.   Provided education on Zoladex    Patient instructed to report any side affects to ordering provider.  Patient tolerated Yes.   Any issues or concerns during appointment: No.  Patient aware of next infusion appointment on pt will changed on mychart  Discharged ambulated @ 9:34am.

## 2024-11-13 ENCOUNTER — PATIENT MESSAGE (OUTPATIENT)
Dept: PRIMARY CARE CLINIC | Facility: CLINIC | Age: 34
End: 2024-11-13

## 2024-11-13 RX ORDER — VALACYCLOVIR HYDROCHLORIDE 1 G/1
2000 TABLET, FILM COATED ORAL 2 TIMES DAILY
Qty: 12 TABLET | Refills: 11 | Status: SHIPPED | OUTPATIENT
Start: 2024-11-13 | End: 2024-11-16

## 2024-11-26 DIAGNOSIS — C50.919 MALIGNANT NEOPLASM OF FEMALE BREAST, UNSPECIFIED ESTROGEN RECEPTOR STATUS, UNSPECIFIED LATERALITY, UNSPECIFIED SITE OF BREAST (HCC): Primary | ICD-10-CM

## 2024-11-29 ENCOUNTER — APPOINTMENT (OUTPATIENT)
Dept: INFUSION THERAPY | Age: 34
End: 2024-11-29
Payer: COMMERCIAL

## 2024-12-06 ENCOUNTER — HOSPITAL ENCOUNTER (OUTPATIENT)
Dept: LAB | Age: 34
Discharge: HOME | End: 2024-12-06
Payer: COMMERCIAL

## 2024-12-06 ENCOUNTER — HOSPITAL ENCOUNTER (OUTPATIENT)
Dept: INFUSION THERAPY | Age: 34
Setting detail: INFUSION SERIES
Discharge: HOME | End: 2024-12-06
Payer: COMMERCIAL

## 2024-12-06 ENCOUNTER — OFFICE VISIT (OUTPATIENT)
Dept: ONCOLOGY | Age: 34
End: 2024-12-06
Payer: COMMERCIAL

## 2024-12-06 VITALS
HEIGHT: 65 IN | HEART RATE: 67 BPM | BODY MASS INDEX: 28.49 KG/M2 | TEMPERATURE: 98.1 F | DIASTOLIC BLOOD PRESSURE: 80 MMHG | SYSTOLIC BLOOD PRESSURE: 136 MMHG | WEIGHT: 171 LBS | OXYGEN SATURATION: 99 % | RESPIRATION RATE: 16 BRPM

## 2024-12-06 DIAGNOSIS — Z79.810 LONG-TERM CURRENT USE OF TAMOXIFEN: ICD-10-CM

## 2024-12-06 DIAGNOSIS — Z79.818 USE OF GOSERELIN ACETATE (ZOLADEX): ICD-10-CM

## 2024-12-06 DIAGNOSIS — C50.919 MALIGNANT NEOPLASM OF FEMALE BREAST, UNSPECIFIED ESTROGEN RECEPTOR STATUS, UNSPECIFIED LATERALITY, UNSPECIFIED SITE OF BREAST (HCC): ICD-10-CM

## 2024-12-06 DIAGNOSIS — Z91.89 AT RISK FOR OSTEOPOROSIS: ICD-10-CM

## 2024-12-06 DIAGNOSIS — C50.919 MALIGNANT NEOPLASM OF FEMALE BREAST, UNSPECIFIED ESTROGEN RECEPTOR STATUS, UNSPECIFIED LATERALITY, UNSPECIFIED SITE OF BREAST (HCC): Primary | ICD-10-CM

## 2024-12-06 DIAGNOSIS — M54.9 BACK PAIN, UNSPECIFIED BACK LOCATION, UNSPECIFIED BACK PAIN LATERALITY, UNSPECIFIED CHRONICITY: ICD-10-CM

## 2024-12-06 DIAGNOSIS — Z90.13 HX OF BILATERAL MASTECTOMY: ICD-10-CM

## 2024-12-06 LAB
ALBUMIN SERPL-MCNC: 3.8 G/DL (ref 3.5–5)
ALBUMIN/GLOB SERPL: 1.2 (ref 1–1.9)
ALP SERPL-CCNC: 59 U/L (ref 35–104)
ALT SERPL-CCNC: 38 U/L (ref 8–45)
ANION GAP SERPL CALC-SCNC: 10 MMOL/L (ref 7–16)
AST SERPL-CCNC: 27 U/L (ref 15–37)
BASOPHILS # BLD: 0.1 K/UL (ref 0–0.2)
BASOPHILS NFR BLD: 1 % (ref 0–2)
BILIRUB SERPL-MCNC: <0.2 MG/DL (ref 0–1.2)
BUN SERPL-MCNC: 13 MG/DL (ref 6–23)
CALCIUM SERPL-MCNC: 9.6 MG/DL (ref 8.8–10.2)
CHLORIDE SERPL-SCNC: 104 MMOL/L (ref 98–107)
CO2 SERPL-SCNC: 27 MMOL/L (ref 20–29)
CREAT SERPL-MCNC: 0.75 MG/DL (ref 0.6–1.1)
DIFFERENTIAL METHOD BLD: NORMAL
EOSINOPHIL # BLD: 0.4 K/UL (ref 0–0.8)
EOSINOPHIL NFR BLD: 6 % (ref 0.5–7.8)
ERYTHROCYTE [DISTWIDTH] IN BLOOD BY AUTOMATED COUNT: 12.3 % (ref 11.9–14.6)
GLOBULIN SER CALC-MCNC: 3.3 G/DL (ref 2.3–3.5)
GLUCOSE SERPL-MCNC: 114 MG/DL (ref 70–99)
HCG UR QL: NEGATIVE
HCT VFR BLD AUTO: 41.5 % (ref 35.8–46.3)
HGB BLD-MCNC: 13.8 G/DL (ref 11.7–15.4)
IMM GRANULOCYTES # BLD AUTO: 0 K/UL (ref 0–0.5)
IMM GRANULOCYTES NFR BLD AUTO: 0 % (ref 0–5)
LYMPHOCYTES # BLD: 1.9 K/UL (ref 0.5–4.6)
LYMPHOCYTES NFR BLD: 28 % (ref 13–44)
MCH RBC QN AUTO: 30.3 PG (ref 26.1–32.9)
MCHC RBC AUTO-ENTMCNC: 33.3 G/DL (ref 31.4–35)
MCV RBC AUTO: 91.2 FL (ref 82–102)
MONOCYTES # BLD: 0.4 K/UL (ref 0.1–1.3)
MONOCYTES NFR BLD: 6 % (ref 4–12)
NEUTS SEG # BLD: 4 K/UL (ref 1.7–8.2)
NEUTS SEG NFR BLD: 59 % (ref 43–78)
NRBC # BLD: 0 K/UL (ref 0–0.2)
PLATELET # BLD AUTO: 235 K/UL (ref 150–450)
PMV BLD AUTO: 11.1 FL (ref 9.4–12.3)
POTASSIUM SERPL-SCNC: 4.3 MMOL/L (ref 3.5–5.1)
PROT SERPL-MCNC: 7.1 G/DL (ref 6.3–8.2)
RBC # BLD AUTO: 4.55 M/UL (ref 4.05–5.2)
SODIUM SERPL-SCNC: 141 MMOL/L (ref 136–145)
WBC # BLD AUTO: 6.8 K/UL (ref 4.3–11.1)

## 2024-12-06 PROCEDURE — 80053 COMPREHEN METABOLIC PANEL: CPT

## 2024-12-06 PROCEDURE — 36415 COLL VENOUS BLD VENIPUNCTURE: CPT

## 2024-12-06 PROCEDURE — 6360000002 HC RX W HCPCS: Performed by: INTERNAL MEDICINE

## 2024-12-06 PROCEDURE — 99214 OFFICE O/P EST MOD 30 MIN: CPT

## 2024-12-06 PROCEDURE — 81025 URINE PREGNANCY TEST: CPT

## 2024-12-06 PROCEDURE — 85025 COMPLETE CBC W/AUTO DIFF WBC: CPT

## 2024-12-06 PROCEDURE — 96402 CHEMO HORMON ANTINEOPL SQ/IM: CPT

## 2024-12-06 RX ORDER — SODIUM CHLORIDE 9 MG/ML
INJECTION, SOLUTION INTRAVENOUS CONTINUOUS
Status: DISCONTINUED | OUTPATIENT
Start: 2024-12-06 | End: 2024-12-07 | Stop reason: HOSPADM

## 2024-12-06 RX ORDER — HYDROCORTISONE SODIUM SUCCINATE 100 MG/2ML
100 INJECTION INTRAMUSCULAR; INTRAVENOUS
OUTPATIENT
Start: 2024-12-27

## 2024-12-06 RX ORDER — ONDANSETRON 2 MG/ML
8 INJECTION INTRAMUSCULAR; INTRAVENOUS
OUTPATIENT
Start: 2024-12-27

## 2024-12-06 RX ORDER — DIPHENHYDRAMINE HYDROCHLORIDE 50 MG/ML
50 INJECTION INTRAMUSCULAR; INTRAVENOUS
OUTPATIENT
Start: 2025-01-03

## 2024-12-06 RX ORDER — EPINEPHRINE 1 MG/ML
0.3 INJECTION, SOLUTION, CONCENTRATE INTRAVENOUS PRN
OUTPATIENT
Start: 2024-12-27

## 2024-12-06 RX ORDER — ONDANSETRON 2 MG/ML
8 INJECTION INTRAMUSCULAR; INTRAVENOUS
OUTPATIENT
Start: 2025-01-03

## 2024-12-06 RX ORDER — ALBUTEROL SULFATE 90 UG/1
4 INHALANT RESPIRATORY (INHALATION) PRN
OUTPATIENT
Start: 2025-01-03

## 2024-12-06 RX ORDER — ALBUTEROL SULFATE 90 UG/1
4 INHALANT RESPIRATORY (INHALATION) PRN
Status: DISCONTINUED | OUTPATIENT
Start: 2024-12-06 | End: 2024-12-07 | Stop reason: HOSPADM

## 2024-12-06 RX ORDER — HYDROCORTISONE SODIUM SUCCINATE 100 MG/2ML
100 INJECTION INTRAMUSCULAR; INTRAVENOUS
OUTPATIENT
Start: 2025-01-03

## 2024-12-06 RX ORDER — SODIUM CHLORIDE 9 MG/ML
INJECTION, SOLUTION INTRAVENOUS CONTINUOUS
OUTPATIENT
Start: 2025-01-03

## 2024-12-06 RX ORDER — SODIUM CHLORIDE 9 MG/ML
INJECTION, SOLUTION INTRAVENOUS CONTINUOUS
OUTPATIENT
Start: 2024-12-27

## 2024-12-06 RX ORDER — ACETAMINOPHEN 325 MG/1
650 TABLET ORAL
Status: DISCONTINUED | OUTPATIENT
Start: 2024-12-06 | End: 2024-12-07 | Stop reason: HOSPADM

## 2024-12-06 RX ORDER — EPINEPHRINE 1 MG/ML
0.3 INJECTION, SOLUTION, CONCENTRATE INTRAVENOUS PRN
OUTPATIENT
Start: 2025-01-03

## 2024-12-06 RX ORDER — ALBUTEROL SULFATE 90 UG/1
4 INHALANT RESPIRATORY (INHALATION) PRN
OUTPATIENT
Start: 2024-12-27

## 2024-12-06 RX ORDER — EPINEPHRINE 1 MG/ML
0.3 INJECTION, SOLUTION, CONCENTRATE INTRAVENOUS PRN
Status: DISCONTINUED | OUTPATIENT
Start: 2024-12-06 | End: 2024-12-07 | Stop reason: HOSPADM

## 2024-12-06 RX ORDER — ACETAMINOPHEN 325 MG/1
650 TABLET ORAL
OUTPATIENT
Start: 2024-12-27

## 2024-12-06 RX ORDER — DIPHENHYDRAMINE HYDROCHLORIDE 50 MG/ML
50 INJECTION INTRAMUSCULAR; INTRAVENOUS
Status: DISCONTINUED | OUTPATIENT
Start: 2024-12-06 | End: 2024-12-07 | Stop reason: HOSPADM

## 2024-12-06 RX ORDER — ACETAMINOPHEN 325 MG/1
650 TABLET ORAL
OUTPATIENT
Start: 2025-01-03

## 2024-12-06 RX ORDER — HYDROCORTISONE SODIUM SUCCINATE 100 MG/2ML
100 INJECTION INTRAMUSCULAR; INTRAVENOUS
Status: DISCONTINUED | OUTPATIENT
Start: 2024-12-06 | End: 2024-12-07 | Stop reason: HOSPADM

## 2024-12-06 RX ORDER — ONDANSETRON 2 MG/ML
8 INJECTION INTRAMUSCULAR; INTRAVENOUS
Status: DISCONTINUED | OUTPATIENT
Start: 2024-12-06 | End: 2024-12-07 | Stop reason: HOSPADM

## 2024-12-06 RX ORDER — DIPHENHYDRAMINE HYDROCHLORIDE 50 MG/ML
50 INJECTION INTRAMUSCULAR; INTRAVENOUS
OUTPATIENT
Start: 2024-12-27

## 2024-12-06 RX ADMIN — GOSERELIN ACETATE 3.6 MG: 3.6 IMPLANT SUBCUTANEOUS at 14:40

## 2024-12-06 ASSESSMENT — PATIENT HEALTH QUESTIONNAIRE - PHQ9
2. FEELING DOWN, DEPRESSED OR HOPELESS: NOT AT ALL
SUM OF ALL RESPONSES TO PHQ QUESTIONS 1-9: 0
1. LITTLE INTEREST OR PLEASURE IN DOING THINGS: NOT AT ALL
SUM OF ALL RESPONSES TO PHQ9 QUESTIONS 1 & 2: 0

## 2024-12-06 NOTE — PROGRESS NOTES
Arrived to the Infusion Center.  Zoladex completed. Patient tolerated without difficulty.   Any issues or concerns during appointment: None.  Patient aware of next infusion appointment on 01/03/25 (date) at 1000 (time).  Patient instructed to call provider with temperature of 100.4 or greater or nausea/vomiting/ diarrhea or pain not controlled by medications  Discharged ambulatory.

## 2024-12-16 ENCOUNTER — CLINICAL DOCUMENTATION (OUTPATIENT)
Dept: ONCOLOGY | Age: 34
End: 2024-12-16

## 2024-12-16 ASSESSMENT — ENCOUNTER SYMPTOMS
CONSTIPATION: 0
NAUSEA: 0
TROUBLE SWALLOWING: 0
DIARRHEA: 0
CHEST TIGHTNESS: 0
SHORTNESS OF BREATH: 0
EYES NEGATIVE: 1
WHEEZING: 0
COLOR CHANGE: 0
ABDOMINAL PAIN: 0

## 2024-12-16 NOTE — PROGRESS NOTES
on genomic assay, and on case-by-case basis.   For women who are not at high risk of recurrence and over 35, tamoxifen alone can be started rather than OFS with endocrine therapy.  Moreover, although women with Her2 + disease were included in the trial the analysis is complicated.  Only ~60% of Her2  + pts in SOFT and TEXT trails got trastuzumab.  Today, we have a number of txs available to women with Her2 positive disease and it seems rather unlikely that ovarian suppression will be evaluated in this pt population in a large clinical trial.  In addition,  women with Her2 positive disease have great prognosis after chemotherapy/available targeted therapies with standard endocrine therapy.  She will think about these options and let me know how she'd like to proceed.  She has decided to proceed with Tamoxifen  with ovarian suppression.   - plan to switch to AI with OS now; completed surgical interventions - s/p implant exchange ~3wks ago.  Continues to have hot flashes on Wray.  Switching to anastrozole with monthly OS - stressed  importance of OS need while on AI and premenopausal.  Anastrozole can cause hot flashes, HTN, angina, swelling, fatigue, bone thinning leading to osteoporosis/fractures, joint stiffness, N/V, hair thinning, weight changes, thrombosis and worsening depression  to name a few. Printed info given to pt for her reference.  MOA reviewed.   - pain under right implant/over ribs - relatively stable; s/p CT/MRI/mammo/US and PET.  MRI with some enhancement adj to implant - could be post-surgical.  Reviewed in tumor board  and felt to be benign.  No worrisome findings on subsequent mammo/US and PET.  Now improvement noted on current MRI further suggesting benign finding.    - small (<1cm) nodule above left implant - felt over ribs when pt raises her arm - will be eval on MRI, ?LN - not noted on MRI--stable from previous and unchanged per patient.   - easy bruising - mostly over LE - will check

## 2024-12-17 NOTE — PROGRESS NOTES
Patient was seen for her oncology office visit follow up on 12/6/24.  Medical documentation has been updated to reflect that the patient reports experiencing approximately 10 times per day and that they are affecting her sleep.      An appeal letter was composed and faxed to [employer] at #[phone].  A copy of the oncology office visit progress note dated 12/6/24 was included with the submission for review.    PA Reference #[id number]  Proficient Transaction ID [id number]

## 2024-12-18 DIAGNOSIS — C50.919 MALIGNANT NEOPLASM OF FEMALE BREAST, UNSPECIFIED ESTROGEN RECEPTOR STATUS, UNSPECIFIED LATERALITY, UNSPECIFIED SITE OF BREAST (HCC): Primary | ICD-10-CM

## 2024-12-18 NOTE — PROGRESS NOTES
pain under right implant/over ribs - relatively stable; s/p CT/MRI/mammo/US and PET.  MRI with some enhancement adj to implant - could be post-surgical.  Reviewed in tumor board  and felt to be benign.  No worrisome findings on subsequent mammo/US and PET.  Now improvement noted on current MRI further suggesting benign finding.    - small (<1cm) nodule above left implant - felt over ribs when pt raises her arm - will be eval on MRI, ?LN - not noted on MRI--stable from previous and unchanged per patient.   - easy bruising - mostly over LE - will check nutritional labs : iron and B12; previous results showed B12 defic - s/p 4x B12 inj with sig improvement in counts, can continue with oral supplementation,  can also start vit C daily; checked PFA-100 - pending.     - Most recent MRI reviewed with the patient we will proceed with CT scan per her plastic surgeon from [facility] [place] recommendation; CT chest 6/20/22 - negative for pulmonary disease.    menses - no menses for a few months.  Discussed use of IUD, reviewed safe vaginal lubricants  - previously referred to Dr Anita Jack per request; Cu++ IUD inserted   - here for follow-up and next Zoladex and continues on Aromasin.  She has been doing well overall.    - Vaginal dryness as well as painful intercourse.  She can try vaginal dilators and will also try a different lubricant and vaginal moisturizer.  Se has not tried any of these interventions yet.    - follow up on Tamoxifen.  Overall, she continues to tolerate Tamoxifen much better than AI.  Mild fatigue is ongoing.  There are no GI or bowel complaints.  Appetite is good and weight is stable.  There is no chest pain, shortness of breath, or edema.  Hot flashes have improved, now only once a week. Mild arthralgias remain, but much better than with AI.  There are no breast changes or concerns.  The last 2 months she reports having 5-6 day menses that is heavy, being followed by Dr Ritter (GYN).  Discussed with Dr Mijares who

## 2024-12-27 ENCOUNTER — APPOINTMENT (OUTPATIENT)
Dept: INFUSION THERAPY | Age: 34
End: 2024-12-27
Payer: COMMERCIAL

## 2025-01-03 ENCOUNTER — HOSPITAL ENCOUNTER (OUTPATIENT)
Dept: INFUSION THERAPY | Age: 35
Setting detail: INFUSION SERIES
Discharge: HOME | End: 2025-01-03
Payer: COMMERCIAL

## 2025-01-03 ENCOUNTER — HOSPITAL ENCOUNTER (OUTPATIENT)
Dept: LAB | Age: 35
Discharge: HOME | End: 2025-01-03
Payer: COMMERCIAL

## 2025-01-03 ENCOUNTER — OFFICE VISIT (OUTPATIENT)
Dept: ONCOLOGY | Age: 35
End: 2025-01-03
Payer: COMMERCIAL

## 2025-01-03 VITALS
OXYGEN SATURATION: 98 % | TEMPERATURE: 98.1 F | SYSTOLIC BLOOD PRESSURE: 125 MMHG | WEIGHT: 171.5 LBS | BODY MASS INDEX: 28.57 KG/M2 | DIASTOLIC BLOOD PRESSURE: 77 MMHG | HEART RATE: 60 BPM | HEIGHT: 65 IN | RESPIRATION RATE: 16 BRPM

## 2025-01-03 DIAGNOSIS — C50.919 MALIGNANT NEOPLASM OF FEMALE BREAST, UNSPECIFIED ESTROGEN RECEPTOR STATUS, UNSPECIFIED LATERALITY, UNSPECIFIED SITE OF BREAST (HCC): Primary | ICD-10-CM

## 2025-01-03 DIAGNOSIS — C50.919 MALIGNANT NEOPLASM OF FEMALE BREAST, UNSPECIFIED ESTROGEN RECEPTOR STATUS, UNSPECIFIED LATERALITY, UNSPECIFIED SITE OF BREAST (HCC): ICD-10-CM

## 2025-01-03 DIAGNOSIS — Z79.899 HIGH RISK MEDICATION USE: ICD-10-CM

## 2025-01-03 LAB
ALBUMIN SERPL-MCNC: 3.9 G/DL (ref 3.5–5)
ALBUMIN/GLOB SERPL: 1.3 (ref 1–1.9)
ALP SERPL-CCNC: 56 U/L (ref 35–104)
ALT SERPL-CCNC: 48 U/L (ref 8–45)
ANION GAP SERPL CALC-SCNC: 11 MMOL/L (ref 7–16)
AST SERPL-CCNC: 31 U/L (ref 15–37)
BASOPHILS # BLD: 0.1 K/UL (ref 0–0.2)
BASOPHILS NFR BLD: 1 % (ref 0–2)
BILIRUB SERPL-MCNC: 0.4 MG/DL (ref 0–1.2)
BUN SERPL-MCNC: 16 MG/DL (ref 6–23)
CALCIUM SERPL-MCNC: 9.1 MG/DL (ref 8.8–10.2)
CHLORIDE SERPL-SCNC: 104 MMOL/L (ref 98–107)
CO2 SERPL-SCNC: 24 MMOL/L (ref 20–29)
CREAT SERPL-MCNC: 0.74 MG/DL (ref 0.6–1.1)
DIFFERENTIAL METHOD BLD: NORMAL
EOSINOPHIL # BLD: 0.4 K/UL (ref 0–0.8)
EOSINOPHIL NFR BLD: 7 % (ref 0.5–7.8)
ERYTHROCYTE [DISTWIDTH] IN BLOOD BY AUTOMATED COUNT: 12.3 % (ref 11.9–14.6)
GLOBULIN SER CALC-MCNC: 3.1 G/DL (ref 2.3–3.5)
GLUCOSE SERPL-MCNC: 102 MG/DL (ref 70–99)
HCG UR QL: NEGATIVE
HCT VFR BLD AUTO: 40.4 % (ref 35.8–46.3)
HGB BLD-MCNC: 13.5 G/DL (ref 11.7–15.4)
IMM GRANULOCYTES # BLD AUTO: 0 K/UL (ref 0–0.5)
IMM GRANULOCYTES NFR BLD AUTO: 0 % (ref 0–5)
LYMPHOCYTES # BLD: 2 K/UL (ref 0.5–4.6)
LYMPHOCYTES NFR BLD: 36 % (ref 13–44)
MCH RBC QN AUTO: 30 PG (ref 26.1–32.9)
MCHC RBC AUTO-ENTMCNC: 33.4 G/DL (ref 31.4–35)
MCV RBC AUTO: 89.8 FL (ref 82–102)
MONOCYTES # BLD: 0.4 K/UL (ref 0.1–1.3)
MONOCYTES NFR BLD: 7 % (ref 4–12)
NEUTS SEG # BLD: 2.7 K/UL (ref 1.7–8.2)
NEUTS SEG NFR BLD: 49 % (ref 43–78)
NRBC # BLD: 0 K/UL (ref 0–0.2)
PLATELET # BLD AUTO: 223 K/UL (ref 150–450)
PMV BLD AUTO: 10.9 FL (ref 9.4–12.3)
POTASSIUM SERPL-SCNC: 4 MMOL/L (ref 3.5–5.1)
PROT SERPL-MCNC: 6.9 G/DL (ref 6.3–8.2)
RBC # BLD AUTO: 4.5 M/UL (ref 4.05–5.2)
SODIUM SERPL-SCNC: 139 MMOL/L (ref 136–145)
WBC # BLD AUTO: 5.6 K/UL (ref 4.3–11.1)

## 2025-01-03 PROCEDURE — 36415 COLL VENOUS BLD VENIPUNCTURE: CPT

## 2025-01-03 PROCEDURE — 6360000002 HC RX W HCPCS: Performed by: INTERNAL MEDICINE

## 2025-01-03 PROCEDURE — 80053 COMPREHEN METABOLIC PANEL: CPT

## 2025-01-03 PROCEDURE — 85025 COMPLETE CBC W/AUTO DIFF WBC: CPT

## 2025-01-03 PROCEDURE — 81025 URINE PREGNANCY TEST: CPT

## 2025-01-03 PROCEDURE — 96402 CHEMO HORMON ANTINEOPL SQ/IM: CPT

## 2025-01-03 PROCEDURE — 99214 OFFICE O/P EST MOD 30 MIN: CPT | Performed by: INTERNAL MEDICINE

## 2025-01-03 RX ORDER — SODIUM CHLORIDE 9 MG/ML
INJECTION, SOLUTION INTRAVENOUS CONTINUOUS
OUTPATIENT
Start: 2025-01-31

## 2025-01-03 RX ORDER — ACETAMINOPHEN 325 MG/1
650 TABLET ORAL
OUTPATIENT
Start: 2025-01-31

## 2025-01-03 RX ORDER — EPINEPHRINE 1 MG/ML
0.3 INJECTION, SOLUTION, CONCENTRATE INTRAVENOUS PRN
OUTPATIENT
Start: 2025-01-31

## 2025-01-03 RX ORDER — ALBUTEROL SULFATE 90 UG/1
4 INHALANT RESPIRATORY (INHALATION) PRN
OUTPATIENT
Start: 2025-01-31

## 2025-01-03 RX ORDER — HYDROCORTISONE SODIUM SUCCINATE 100 MG/2ML
100 INJECTION INTRAMUSCULAR; INTRAVENOUS
OUTPATIENT
Start: 2025-01-31

## 2025-01-03 RX ORDER — ONDANSETRON 2 MG/ML
8 INJECTION INTRAMUSCULAR; INTRAVENOUS
OUTPATIENT
Start: 2025-01-31

## 2025-01-03 RX ORDER — DIPHENHYDRAMINE HYDROCHLORIDE 50 MG/ML
50 INJECTION INTRAMUSCULAR; INTRAVENOUS
OUTPATIENT
Start: 2025-01-31

## 2025-01-03 RX ADMIN — GOSERELIN ACETATE 3.6 MG: 3.6 IMPLANT SUBCUTANEOUS at 09:00

## 2025-01-03 ASSESSMENT — PATIENT HEALTH QUESTIONNAIRE - PHQ9
1. LITTLE INTEREST OR PLEASURE IN DOING THINGS: NOT AT ALL
SUM OF ALL RESPONSES TO PHQ9 QUESTIONS 1 & 2: 0
SUM OF ALL RESPONSES TO PHQ QUESTIONS 1-9: 0
2. FEELING DOWN, DEPRESSED OR HOPELESS: NOT AT ALL

## 2025-01-03 NOTE — PROGRESS NOTES
Arrived to the Infusion Center.  Zoladex completed. Patient tolerated without complication.   Any issues or concerns during appointment: No.  Patient aware of next infusion appointment on 01/31/25 (date) at 1130 (time).  Patient instructed to call provider with temperature of 100.4 or greater or nausea/vomiting/ diarrhea or pain not controlled by medications  Discharged ambulatory.

## 2025-01-03 NOTE — PATIENT INSTRUCTIONS
Patient Information from Today's Visit    The members of your [facility] are listed below:    Physician Provider: Lucrecia Mijares, Medical Oncologist  Advanced Practice Clinician: Jayla Jiang NP  Registered Nurse: Kerri KASPER RN  Navigator: Kirsten DUMONT RN or Emilie RUEDA RN  Medical Assistant: Donna MAYA MA  : Carmela VIERA   Supportive Care Services: Estrellita LEYVA LMSW    Diagnosis: Breast      Follow Up Instructions: Monthly.    Labs reviewed.  Symptoms reviewed.      Treatment Summary has been discussed and given to patient:N/A      Current Labs:   Hospital Outpatient Visit on 01/03/2025   Component Date Value Ref Range Status    Pregnancy, Urine 01/03/2025 Negative  NEG   Final    WBC 01/03/2025 5.6  4.3 - 11.1 K/uL Final    RBC 01/03/2025 4.50  4.05 - 5.2 M/uL Final    Hemoglobin 01/03/2025 13.5  11.7 - 15.4 g/dL Final    Hematocrit 01/03/2025 40.4  35.8 - 46.3 % Final    MCV 01/03/2025 89.8  82.0 - 102.0 FL Final    MCH 01/03/2025 30.0  26.1 - 32.9 PG Final    MCHC 01/03/2025 33.4  31.4 - 35.0 g/dL Final    RDW 01/03/2025 12.3  11.9 - 14.6 % Final    Platelets 01/03/2025 223  150 - 450 K/uL Final    MPV 01/03/2025 10.9  9.4 - 12.3 FL Final    nRBC 01/03/2025 0.00  0.0 - 0.2 K/uL Final    **Note: Absolute NRBC parameter is now reported with Hemogram**    Neutrophils % 01/03/2025 49  43 - 78 % Final    Lymphocytes % 01/03/2025 36  13 - 44 % Final    Monocytes % 01/03/2025 7  4.0 - 12.0 % Final    Eosinophils % 01/03/2025 7  0.5 - 7.8 % Final    Basophils % 01/03/2025 1  0.0 - 2.0 % Final    Immature Granulocytes % 01/03/2025 0  0.0 - 5.0 % Final    Neutrophils Absolute 01/03/2025 2.7  1.7 - 8.2 K/UL Final    Lymphocytes Absolute 01/03/2025 2.0  0.5 - 4.6 K/UL Final    Monocytes Absolute 01/03/2025 0.4  0.1 - 1.3 K/UL Final    Eosinophils Absolute 01/03/2025 0.4  0.0 - 0.8 K/UL Final    Basophils Absolute 01/03/2025 0.1  0.0 - 0.2 K/UL Final    Immature Granulocytes Absolute 01/03/2025 0.0  0.0 -

## 2025-01-24 ENCOUNTER — APPOINTMENT (OUTPATIENT)
Dept: INFUSION THERAPY | Age: 35
End: 2025-01-24
Payer: COMMERCIAL

## 2025-01-31 ENCOUNTER — APPOINTMENT (OUTPATIENT)
Dept: INFUSION THERAPY | Age: 35
End: 2025-01-31
Payer: COMMERCIAL

## 2025-02-04 ENCOUNTER — HOSPITAL ENCOUNTER (OUTPATIENT)
Dept: INFUSION THERAPY | Age: 35
Setting detail: INFUSION SERIES
Discharge: HOME | End: 2025-02-04
Payer: COMMERCIAL

## 2025-02-04 ENCOUNTER — OFFICE VISIT (OUTPATIENT)
Dept: ONCOLOGY | Age: 35
End: 2025-02-04
Payer: COMMERCIAL

## 2025-02-04 ENCOUNTER — HOSPITAL ENCOUNTER (OUTPATIENT)
Dept: LAB | Age: 35
Discharge: HOME | End: 2025-02-04
Payer: COMMERCIAL

## 2025-02-04 VITALS
BODY MASS INDEX: 28.16 KG/M2 | HEIGHT: 65 IN | DIASTOLIC BLOOD PRESSURE: 84 MMHG | SYSTOLIC BLOOD PRESSURE: 134 MMHG | TEMPERATURE: 97.9 F | WEIGHT: 169 LBS | RESPIRATION RATE: 18 BRPM | OXYGEN SATURATION: 100 % | HEART RATE: 59 BPM

## 2025-02-04 DIAGNOSIS — C50.919 MALIGNANT NEOPLASM OF FEMALE BREAST, UNSPECIFIED ESTROGEN RECEPTOR STATUS, UNSPECIFIED LATERALITY, UNSPECIFIED SITE OF BREAST (HCC): Primary | ICD-10-CM

## 2025-02-04 DIAGNOSIS — C50.919 MALIGNANT NEOPLASM OF FEMALE BREAST, UNSPECIFIED ESTROGEN RECEPTOR STATUS, UNSPECIFIED LATERALITY, UNSPECIFIED SITE OF BREAST (HCC): ICD-10-CM

## 2025-02-04 DIAGNOSIS — Z79.899 HIGH RISK MEDICATION USE: ICD-10-CM

## 2025-02-04 LAB
ALBUMIN SERPL-MCNC: 4.3 G/DL (ref 3.5–5)
ALBUMIN/GLOB SERPL: 1.4 (ref 1–1.9)
ALP SERPL-CCNC: 61 U/L (ref 35–104)
ALT SERPL-CCNC: 40 U/L (ref 8–45)
ANION GAP SERPL CALC-SCNC: 11 MMOL/L (ref 7–16)
AST SERPL-CCNC: 30 U/L (ref 15–37)
BASOPHILS # BLD: 0.05 K/UL (ref 0–0.2)
BASOPHILS NFR BLD: 0.9 % (ref 0–2)
BILIRUB SERPL-MCNC: <0.2 MG/DL (ref 0–1.2)
BUN SERPL-MCNC: 11 MG/DL (ref 6–23)
CALCIUM SERPL-MCNC: 9.4 MG/DL (ref 8.8–10.2)
CHLORIDE SERPL-SCNC: 104 MMOL/L (ref 98–107)
CO2 SERPL-SCNC: 25 MMOL/L (ref 20–29)
CREAT SERPL-MCNC: 0.69 MG/DL (ref 0.6–1.1)
DIFFERENTIAL METHOD BLD: NORMAL
EOSINOPHIL # BLD: 0.37 K/UL (ref 0–0.8)
EOSINOPHIL NFR BLD: 6.3 % (ref 0.5–7.8)
ERYTHROCYTE [DISTWIDTH] IN BLOOD BY AUTOMATED COUNT: 12 % (ref 11.9–14.6)
GLOBULIN SER CALC-MCNC: 3 G/DL (ref 2.3–3.5)
GLUCOSE SERPL-MCNC: 124 MG/DL (ref 70–99)
HCG UR QL: NEGATIVE
HCT VFR BLD AUTO: 39.7 % (ref 35.8–46.3)
HGB BLD-MCNC: 13.5 G/DL (ref 11.7–15.4)
IMM GRANULOCYTES # BLD AUTO: 0.01 K/UL (ref 0–0.5)
IMM GRANULOCYTES NFR BLD AUTO: 0.2 % (ref 0–5)
LYMPHOCYTES # BLD: 1.82 K/UL (ref 0.5–4.6)
LYMPHOCYTES NFR BLD: 31.2 % (ref 13–44)
MCH RBC QN AUTO: 30.2 PG (ref 26.1–32.9)
MCHC RBC AUTO-ENTMCNC: 34 G/DL (ref 31.4–35)
MCV RBC AUTO: 88.8 FL (ref 82–102)
MONOCYTES # BLD: 0.41 K/UL (ref 0.1–1.3)
MONOCYTES NFR BLD: 7 % (ref 4–12)
NEUTS SEG # BLD: 3.18 K/UL (ref 1.7–8.2)
NEUTS SEG NFR BLD: 54.4 % (ref 43–78)
NRBC # BLD: 0 K/UL (ref 0–0.2)
PLATELET # BLD AUTO: 253 K/UL (ref 150–450)
PMV BLD AUTO: 11.1 FL (ref 9.4–12.3)
POTASSIUM SERPL-SCNC: 4.3 MMOL/L (ref 3.5–5.1)
PROT SERPL-MCNC: 7.3 G/DL (ref 6.3–8.2)
RBC # BLD AUTO: 4.47 M/UL (ref 4.05–5.2)
SODIUM SERPL-SCNC: 140 MMOL/L (ref 136–145)
WBC # BLD AUTO: 5.8 K/UL (ref 4.3–11.1)

## 2025-02-04 PROCEDURE — 96402 CHEMO HORMON ANTINEOPL SQ/IM: CPT

## 2025-02-04 PROCEDURE — 81025 URINE PREGNANCY TEST: CPT

## 2025-02-04 PROCEDURE — 85025 COMPLETE CBC W/AUTO DIFF WBC: CPT

## 2025-02-04 PROCEDURE — 6360000002 HC RX W HCPCS: Performed by: INTERNAL MEDICINE

## 2025-02-04 PROCEDURE — 99214 OFFICE O/P EST MOD 30 MIN: CPT | Performed by: NURSE PRACTITIONER

## 2025-02-04 PROCEDURE — 36415 COLL VENOUS BLD VENIPUNCTURE: CPT

## 2025-02-04 PROCEDURE — 80053 COMPREHEN METABOLIC PANEL: CPT

## 2025-02-04 RX ORDER — EPINEPHRINE 1 MG/ML
0.3 INJECTION, SOLUTION, CONCENTRATE INTRAVENOUS PRN
OUTPATIENT
Start: 2025-02-25

## 2025-02-04 RX ORDER — DIPHENHYDRAMINE HYDROCHLORIDE 50 MG/ML
50 INJECTION INTRAMUSCULAR; INTRAVENOUS
OUTPATIENT
Start: 2025-02-25

## 2025-02-04 RX ORDER — HYDROCORTISONE SODIUM SUCCINATE 100 MG/2ML
100 INJECTION INTRAMUSCULAR; INTRAVENOUS
OUTPATIENT
Start: 2025-02-25

## 2025-02-04 RX ORDER — SODIUM CHLORIDE 9 MG/ML
INJECTION, SOLUTION INTRAVENOUS CONTINUOUS
OUTPATIENT
Start: 2025-02-25

## 2025-02-04 RX ORDER — ACETAMINOPHEN 325 MG/1
650 TABLET ORAL
OUTPATIENT
Start: 2025-02-25

## 2025-02-04 RX ORDER — ALBUTEROL SULFATE 90 UG/1
4 INHALANT RESPIRATORY (INHALATION) PRN
OUTPATIENT
Start: 2025-02-25

## 2025-02-04 RX ORDER — ONDANSETRON 2 MG/ML
8 INJECTION INTRAMUSCULAR; INTRAVENOUS
OUTPATIENT
Start: 2025-02-25

## 2025-02-04 RX ADMIN — GOSERELIN ACETATE 3.6 MG: 3.6 IMPLANT SUBCUTANEOUS at 15:23

## 2025-02-04 ASSESSMENT — ENCOUNTER SYMPTOMS
EYES NEGATIVE: 1
SHORTNESS OF BREATH: 0
COLOR CHANGE: 0
TROUBLE SWALLOWING: 0
CHEST TIGHTNESS: 0
ABDOMINAL PAIN: 0
CONSTIPATION: 0
NAUSEA: 0
DIARRHEA: 0
WHEEZING: 0

## 2025-02-04 NOTE — PROGRESS NOTES
[facility]: Office Visit Established Patient    Chief Complaint   Patient presents with    Follow-up        Diagnosis: ER/CA/AR/Her+ right breast cancer   Right under implant/rib pain - same   [place] [facility] [place] to [place]      History of Present Illness:  Ms. Momin is a 34 y.o. female who returns today for management of breast cancer.  The past medical history  is significant for asthma.  She initially presented in August 2018 with a mass in her right lateral breast that had been present for approximately 8 years with no nipple discharge or skin changes.  Initial workup, including right breast US on 8/27/18  identified a 3.5 cm mass with no suspicious axillary lymph nodes.  On 9/11/2018 she underwent US guided core biopsy which identified IDC, grade 3, measuring 13 mm in maximal linear dimension.  She saw genetics counselor for testing on 9/17/18 and  underwent egg collection and preservation.  She subsequently underwent right partial mastectomy and sentinel lymph node biopsy on 9/24/18 which revealed stage IIb ([id number]), ER/CA/ AR+, HER-2 FISH positive, IDC at 9 o'clock.  Surgical margins were  uninvolved by invasive carcinoma, however, DCIS was present at the lateral margin focally, 3 mm, focus of DCIS was also found 1 mm from deep margin and 2 mm from inferior margin.  Micrometastasis measuring 13 mm was also found in 1/9 lymph nodes.  No  micrometastatic disease was appreciated.  She was then seen by Dr. Mike Gilliam, Oncologist at [facility] in [place], and on 10/22/18, she started C1 adjuvant TCHP.  She developed neutropenic fever requiring hospitalization from  12/9/18 - 12/12/18 after C3.  No source was found, but viral URI was presumed.  C4 was delayed one week due to 70K platelet count.  C4 was administered on 12/31/18 with carbo dose reduced to 700 mg (AUC 4.85).  Plan was to completed 6 cycles  of TCHP, with

## 2025-02-04 NOTE — PROGRESS NOTES
Arrived to the [facility]fusion Center ambulatory.  Zoladex injection completed.   Patient has received this medication before and has no questions.    Patient instructed to report any side affects to ordering provider.  Patient tolerated well.   Any issues or concerns during appointment: none.  Patient aware of next infusion appointment on 2/28/2025 (date) at 0930 (time).  Discharged home ambulatory.

## 2025-02-05 ENCOUNTER — TELEPHONE (OUTPATIENT)
Dept: RADIATION ONCOLOGY | Age: 35
End: 2025-02-05

## 2025-02-05 NOTE — TELEPHONE ENCOUNTER
Physician provider: Dr. Mijares  Reason for today's call (Please detail here patients chief complaint): Kaylee from [facility] she received a prior authorization for this patient. She want to know will it be okay for the medication zoladex   3.6 mg be sent to the facility.   Last office visit:na  Patient Callback Number: [phone]  Was callback number verified?: Yes  Preferred pharmacy (If refill request): na  Calls to office within the last 48 hours?:No    Patient notified that their information will be routed to the [facility] clinical triage team for review. Patient is advised that they will receive a phone call from the triage department. If symptom related and symptoms worsen before receiving a call back, the patient has been advised to proceed to the nearest ED.

## 2025-02-10 NOTE — PROGRESS NOTES
Arrived to the Infusion Center.  Zoladex completed.   Provided education on Zoladex    Patient instructed to report any side affects to ordering provider.  Patient tolerated Zoladex.   Any issues or concerns during appointment: none.  Patient aware of next infusion appointment on 3/4/25 (date) at 2 PM (time).  Discharged ambulatory.//

## 2025-02-12 SDOH — ECONOMIC STABILITY: INCOME INSECURITY: IN THE LAST 12 MONTHS, WAS THERE A TIME WHEN YOU WERE NOT ABLE TO PAY THE MORTGAGE OR RENT ON TIME?: NO

## 2025-02-12 SDOH — ECONOMIC STABILITY: FOOD INSECURITY: WITHIN THE PAST 12 MONTHS, YOU WORRIED THAT YOUR FOOD WOULD RUN OUT BEFORE YOU GOT MONEY TO BUY MORE.: NEVER TRUE

## 2025-02-12 SDOH — ECONOMIC STABILITY: FOOD INSECURITY: WITHIN THE PAST 12 MONTHS, THE FOOD YOU BOUGHT JUST DIDN'T LAST AND YOU DIDN'T HAVE MONEY TO GET MORE.: NEVER TRUE

## 2025-02-12 SDOH — ECONOMIC STABILITY: TRANSPORTATION INSECURITY
IN THE PAST 12 MONTHS, HAS THE LACK OF TRANSPORTATION KEPT YOU FROM MEDICAL APPOINTMENTS OR FROM GETTING MEDICATIONS?: NO

## 2025-02-13 ENCOUNTER — OFFICE VISIT (OUTPATIENT)
Dept: PRIMARY CARE CLINIC | Facility: CLINIC | Age: 35
End: 2025-02-13
Payer: COMMERCIAL

## 2025-02-13 ENCOUNTER — CLINICAL DOCUMENTATION (OUTPATIENT)
Dept: ONCOLOGY | Age: 35
End: 2025-02-13

## 2025-02-13 VITALS
SYSTOLIC BLOOD PRESSURE: 128 MMHG | HEIGHT: 66 IN | BODY MASS INDEX: 27.48 KG/M2 | HEART RATE: 59 BPM | OXYGEN SATURATION: 98 % | DIASTOLIC BLOOD PRESSURE: 76 MMHG | TEMPERATURE: 97.9 F | WEIGHT: 171 LBS

## 2025-02-13 DIAGNOSIS — F41.9 ANXIETY: ICD-10-CM

## 2025-02-13 DIAGNOSIS — D05.11 INTRADUCTAL CARCINOMA IN SITU OF RIGHT BREAST: ICD-10-CM

## 2025-02-13 DIAGNOSIS — R73.9 HYPERGLYCEMIA: Primary | ICD-10-CM

## 2025-02-13 PROCEDURE — 99214 OFFICE O/P EST MOD 30 MIN: CPT | Performed by: FAMILY MEDICINE

## 2025-02-13 RX ORDER — LORAZEPAM 1 MG/1
1 TABLET ORAL EVERY 8 HOURS PRN
Qty: 60 TABLET | Refills: 5 | Status: SHIPPED | OUTPATIENT
Start: 2025-02-13 | End: 2025-06-13

## 2025-02-13 ASSESSMENT — PATIENT HEALTH QUESTIONNAIRE - PHQ9
SUM OF ALL RESPONSES TO PHQ QUESTIONS 1-9: 0
1. LITTLE INTEREST OR PLEASURE IN DOING THINGS: NOT AT ALL
SUM OF ALL RESPONSES TO PHQ QUESTIONS 1-9: 0
SUM OF ALL RESPONSES TO PHQ9 QUESTIONS 1 & 2: 0
2. FEELING DOWN, DEPRESSED OR HOPELESS: NOT AT ALL

## 2025-02-13 ASSESSMENT — ENCOUNTER SYMPTOMS
ABDOMINAL PAIN: 0
DIARRHEA: 0
SHORTNESS OF BREATH: 0
VOMITING: 0
NAUSEA: 0
COUGH: 0

## 2025-02-13 NOTE — ASSESSMENT & PLAN NOTE
Chronic, stable on Lorazepam.  Will send refills, patient about to move so will increase to 60 pills with 5 refills until she can get established with a PCP in [place].

## 2025-02-13 NOTE — ASSESSMENT & PLAN NOTE
Chronic issue for the last few months, persistent on multiple CMPs.  POC A1C is normal at 5.3 today.  Discussed working on healthy low-fat diet with plenty of fruits, vegetables and whole grains for fiber to help slow glucose absorption.  Encouraged both cardio and strength training to help with blood sugars, as well.

## 2025-02-13 NOTE — PROGRESS NOTES
2/10/25  Call received from [facility] Nurse Eleanor, stating that the Zoladex 3.6 mg syringe will need to be supplied by [facility] instead of billed as a medical buy and bill drug.  She will be completing the referral and submitting to [facility] and the specialty pharmacy will reach out for a prescription authorization.    [facility] Nurse : Eleanor  Phone #[phone] ext. [phone]    2/12/25  Follow up call received from [facility] Nurse Eleanor.  She has spoken with the patient and the patient has informed her that she will be moving to [place] and will not need for her medication to be supplied by [facility].  Therefore, the redirection of the Zoladex referral to [facility] will be withdrawn.  Per Eleanor, we have a one time buy and bill authorization that can be used through 3/6/25 should the patient need a dose of Zoladex 3.6 mg prior to moving out of [facility].

## 2025-02-13 NOTE — PATIENT INSTRUCTIONS
IT WAS GREAT TO SEE YOU TODAY!    YOUR HEMOGLOBIN A1C IS NORMAL AT 5.3.  TO KEEP THIS UNDER CONTROL, WORK ON EATING A LOW-FAT DIET AND WHOLE GRAINS TO INCREASE YOUR FIBER INTAKE AS THIS HELPS SLOW GLUCOSE ABSORPTION.  TRY TO DRINK MORE WATER AND CUT BACK ON SODA, ICED TEA, JUICE AND ALCOHOL.    PLEASE TAKE ALL MEDICATION AS DISCUSSED.    ~I SENT REFILLS OF LORAZEPAM TO THE PHARMACY TO HELP WITH ANXIETY.    PLEASE CALL WITH CONCERNS [phone]

## 2025-02-13 NOTE — PROGRESS NOTES
[facility]  Sena Almazan, DO  [address], [place] [address]  [phone]         ASSESSMENT AND PLAN    Problem List Items Addressed This Visit          Other    Intraductal carcinoma in situ of right breast      Chronic, managed by Oncology.  Just recommended using Tamoxifen and Zoladex for another five years, which she is not super excited about due to side effects.  Will be moving to [place] so will continue care there.         Relevant Medications    LORazepam (ATIVAN) 1 MG tablet    Anxiety     Chronic, stable on Lorazepam.  Will send refills, patient about to move so will increase to 60 pills with 5 refills until she can get established with a PCP in [place].         Relevant Medications    LORazepam (ATIVAN) 1 MG tablet    Hyperglycemia - Primary     Chronic issue for the last few months, persistent on multiple CMPs.  POC A1C is normal at 5.3 today.  Discussed working on healthy low-fat diet with plenty of fruits, vegetables and whole grains for fiber to help slow glucose absorption.  Encouraged both cardio and strength training to help with blood sugars, as well.             The diagnoses and plan were discussed with the patient, who verbalizes understanding and agrees with plan.  All questions answered.    Chief Complaint    Chief Complaint   Patient presents with    6 Month Follow-Up    Other     Blood sugar concerns          HISTORY OF PRESENT ILLNESS    34 y.o. female with history of breast cancer presents today for follow up.  Last seen by me six months ago, had labs checked and was doing well on Ativan at night to help her rest. 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  Notes that she saw Dr. Mijares last week, who recommends taking Tamoxifen and Zoladex for another five years.  Has noticed that she has had persistently elevated blood sugars.  Has been working out more.

## 2025-02-13 NOTE — ASSESSMENT & PLAN NOTE
Chronic, managed by Oncology.  Just recommended using Tamoxifen and Zoladex for another five years, which she is not super excited about due to side effects but is actively continuing hormone treatment.  Will be moving to [place] so will continue care there.